# Patient Record
Sex: FEMALE | Race: BLACK OR AFRICAN AMERICAN | Employment: OTHER | URBAN - METROPOLITAN AREA
[De-identification: names, ages, dates, MRNs, and addresses within clinical notes are randomized per-mention and may not be internally consistent; named-entity substitution may affect disease eponyms.]

---

## 2017-02-13 RX ORDER — VALSARTAN 320 MG/1
TABLET ORAL
Qty: 90 TAB | Refills: 3 | Status: SHIPPED | OUTPATIENT
Start: 2017-02-13 | End: 2017-03-31

## 2017-02-13 RX ORDER — SIMVASTATIN 5 MG/1
TABLET, FILM COATED ORAL
Qty: 90 TAB | Refills: 3 | Status: SHIPPED | OUTPATIENT
Start: 2017-02-13 | End: 2018-02-27 | Stop reason: SDUPTHER

## 2017-02-20 ENCOUNTER — OFFICE VISIT (OUTPATIENT)
Dept: FAMILY MEDICINE CLINIC | Facility: CLINIC | Age: 82
End: 2017-02-20

## 2017-02-20 VITALS
TEMPERATURE: 97.5 F | HEIGHT: 61 IN | WEIGHT: 142.8 LBS | SYSTOLIC BLOOD PRESSURE: 134 MMHG | RESPIRATION RATE: 15 BRPM | OXYGEN SATURATION: 98 % | HEART RATE: 60 BPM | BODY MASS INDEX: 26.96 KG/M2 | DIASTOLIC BLOOD PRESSURE: 70 MMHG

## 2017-02-20 DIAGNOSIS — I10 ESSENTIAL HYPERTENSION: ICD-10-CM

## 2017-02-20 DIAGNOSIS — L30.9 ECZEMA, UNSPECIFIED TYPE: ICD-10-CM

## 2017-02-20 DIAGNOSIS — E11.8 TYPE 2 DIABETES MELLITUS WITH COMPLICATION, WITHOUT LONG-TERM CURRENT USE OF INSULIN (HCC): Primary | ICD-10-CM

## 2017-02-20 DIAGNOSIS — M1A.0790 IDIOPATHIC CHRONIC GOUT OF FOOT WITHOUT TOPHUS, UNSPECIFIED LATERALITY: ICD-10-CM

## 2017-02-20 LAB — HBA1C MFR BLD HPLC: 6.3 %

## 2017-02-20 RX ORDER — TRIAMCINOLONE ACETONIDE 1 MG/G
OINTMENT TOPICAL 2 TIMES DAILY
Qty: 270 G | Refills: 0 | Status: SHIPPED | OUTPATIENT
Start: 2017-02-20 | End: 2017-03-31

## 2017-02-20 NOTE — MR AVS SNAPSHOT
Visit Information Date & Time Provider Department Dept. Phone Encounter #  
 2/20/2017  9:30 AM Funmilayo Tomas MD [facility] [phone] [id number] Follow-up Instructions Return in about 3 months (around 5/20/2017). Your Appointments 5/22/2017  9:30 AM  
Follow Up with Funmilayo Tomas MD  
[facility] ([facility]) Appt Note: Return in about 3 months (around 5/20/2017) for Fasting. [address] [place] [address] [phone]  
[address] [place] [address] Upcoming Health Maintenance Date Due ZOSTER VACCINE AGE 60> 2/21/2017* DTaP/Tdap/Td series (1 - Tdap) 2/21/2017* FOOT EXAM Q1 4/28/2017 MICROALBUMIN Q1 4/28/2017 EYE EXAM RETINAL OR DILATED Q1 5/19/2017 HEMOGLOBIN A1C Q6M 5/21/2017 LIPID PANEL Q1 11/21/2017 MEDICARE YEARLY EXAM 11/22/2017 GLAUCOMA SCREENING Q2Y 5/19/2018 *Topic was postponed. The date shown is not the original due date. Allergies as of 2/20/2017  Review Complete On: 2/20/2017 By: Funmilayo Tomas MD  
 No Known Allergies Current Immunizations  Reviewed on 2/20/2017 Name Date Influenza Vaccine (Quad) PF 12/21/2016 10:55 AM, 9/23/2015  9:50 AM  
 Pneumococcal Conjugate (PCV-13) 11/5/2015 12:37 PM  
 Pneumococcal Polysaccharide (PPSV-23) 11/23/2016 Reviewed by Erika Gonzales on 2/20/2017 at  9:32 AM  
You Were Diagnosed With   
  
 Codes Comments Type 2 diabetes mellitus with complication, without long-term current use of insulin (HCC)    -  Primary ICD-10-CM: E11.8 ICD-9-CM: 250.90 Eczema, unspecified type     ICD-10-CM: L30.9 ICD-9-CM: 692.9 Essential hypertension     ICD-10-CM: I10 
ICD-9-CM: 401.9 Idiopathic chronic gout of foot without tophus, unspecified laterality     ICD-10-CM: M1A.0790 ICD-9-CM: 274.02 Vitals BP Pulse Temp Resp Height(growth percentile) Weight(growth percentile) 134/70 (BP 1 Location: Left arm, BP Patient Position: Sitting) 60 97.5 °F (36.4 °C) (Oral) 15 5' 1\" (1.549 m) 142 lb 12.8 oz (64.8 kg) SpO2 BMI OB Status Smoking Status 98% 26.98 kg/m2 Menopause Former Smoker Vitals History BMI and BSA Data Body Mass Index Body Surface Area  
 26.98 kg/m 2 1.67 m 2 Preferred Pharmacy Pharmacy Name Phone [id number] [address]  [phone] Your Updated Medication List  
  
   
This list is accurate as of: 2/20/17 10:25 AM.  Always use your most recent med list. amLODIPine 5 mg tablet Commonly known as:  Membreno Del Take 1 Tab by mouth daily. aspirin delayed-release 81 mg tablet Take  by mouth daily. Blood-Glucose Meter monitoring kit Check glucose once daily. Re: DM .00  
  
 clopidogrel 75 mg Tab Commonly known as:  PLAVIX Take 1 Tab by mouth daily. diph,Pertuss(Acell),Tet Vac-PF 2 Lf-(2.5-5-3-5 mcg)-5Lf/0.5 mL susp Commonly known as:  ADACEL  
0.5 mL by IntraMUSCular route once for 1 dose. glucose blood VI test strips strip Commonly known as:  blood glucose test  
Check glucose once daily. Re: DM .00 Lancets Misc Check glucose once daily. Re: DM .00  
  
 metoprolol tartrate 50 mg tablet Commonly known as:  LOPRESSOR  
take 1/2 tablet by mouth twice a day  
  
 predniSONE 20 mg tablet Commonly known as:  Sandy Hawks Take 3 tab for 1 day, 2 tab or 2 days, 1 tab for 5 days. simvastatin 5 mg tablet Commonly known as:  ZOCOR  
take 1 tablet by mouth at bedtime  
  
 triamcinolone acetonide 0.1 % ointment Commonly known as:  KENALOG Apply  to affected area two (2) times a day. use thin layer  
  
 valsartan 320 mg tablet Commonly known as:  DIOVAN  
take 1 tablet by mouth once daily Prescriptions Printed Refills diph,Pertuss,Acell,,Tet Vac-PF (ADACEL) 2 Lf-(2.5-5-3-5 mcg)-5Lf/0.5 mL susp 0 Si.5 mL by IntraMUSCular route once for 1 dose. Class: Print Route: IntraMUSCular Prescriptions Sent to Pharmacy Refills  
 triamcinolone acetonide (KENALOG) 0.1 % ointment 0 Sig: Apply  to affected area two (2) times a day. use thin layer Class: Normal  
 Pharmacy: [address]  #: [phone] Route: Topical  
  
We Performed the Following AMB POC HEMOGLOBIN A1C [21771 CPT(R)] Follow-up Instructions Return in about 3 months (around 2017). Introducing [facility]! Makayla Bailon introduces [employer] patient portal. Now you can access parts of your medical record, email your doctor's office, and request medication refills online. 1. In your internet browser, go to [email or web address] 2. Click on the First Time User? Click Here link in the Sign In box. You will see the New Member Sign Up page. 3. Enter your [employer] Access Code exactly as it appears below. You will not need to use this code after youve completed the sign-up process. If you do not sign up before the expiration date, you must request a new code. · [employer] Access Code: [email or web address] Expires: 2017  9:23 AM 
 
4. Enter the last four digits of your Social Security Number (xxxx) and Date of Birth (mm/dd/yyyy) as indicated and click Submit. You will be taken to the next sign-up page. 5. Create a quitchent ID. This will be your [employer] login ID and cannot be changed, so think of one that is secure and easy to remember. 6. Create a quitchent password. You can change your password at any time. 7. Enter your Password Reset Question and Answer. This can be used at a later time if you forget your password. 8. Enter your e-mail address.  You will receive e-mail notification when new information is available in [employer]. 9. Click Sign Up. You can now view and download portions of your medical record. 10. Click the Download Summary menu link to download a portable copy of your medical information. If you have questions, please visit the Frequently Asked Questions section of the [employer] website. Remember, [employer] is NOT to be used for urgent needs. For medical emergencies, dial 911. Now available from your iPhone and Android! Please provide this summary of care documentation to your next provider. Your primary care clinician is listed as [address] [place] [address]. If you have any questions after today's visit, please call [phone].

## 2017-02-20 NOTE — PROGRESS NOTES
SUBJECTIVE:  Zaria Francis is a 80y.o. year old female   Chief Complaint   Patient presents with    Hypertension    Diabetes    Gout       History of Present Illness:     She is following ADA diet for DM II. She is not checking her glucose. Her BP is controlled with medicine. Her DM is controlled w/o medicine. She sees cardiology once a year and Oncology every 6 months. No more gout attack. He has chronic recurrent Eczema on her arms. She has flare up for several months. No Systemic, Cardiovascular or Respiratory symptoms. Past Medical History   Diagnosis Date    Arteriosclerotic heart disease (ASHD)     CKD (chronic kidney disease), stage III 6/23/2015    CVA (cerebral infarction)     Diabetes ([facility] [address]     Gout 9/23/13     Left Foot    Hypertension     Leg weakness, bilateral 3/18/13     History reviewed. No pertinent past surgical history. Current Outpatient Prescriptions   Medication Sig    valsartan (DIOVAN) 320 mg tablet take 1 tablet by mouth once daily    simvastatin (ZOCOR) 5 mg tablet take 1 tablet by mouth at bedtime    clopidogrel (PLAVIX) 75 mg tab Take 1 Tab by mouth daily.  predniSONE (DELTASONE) 20 mg tablet Take 3 tab for 1 day, 2 tab or 2 days, 1 tab for 5 days.  amLODIPine (NORVASC) 5 mg tablet Take 1 Tab by mouth daily.  metoprolol tartrate (LOPRESSOR) 50 mg tablet take 1/2 tablet by mouth twice a day    Blood-Glucose Meter monitoring kit Check glucose once daily. Re: DM .00    glucose blood VI test strips (BLOOD GLUCOSE TEST) strip Check glucose once daily. Re: DM .00    Lancets misc Check glucose once daily. Re: DM .00    aspirin delayed-release 81 mg tablet Take  by mouth daily. No current facility-administered medications for this visit. Taking prednisone PRN gout. No Known Allergies       Review of Systems:   Constitutional: No fever, chills, night sweats, malaise, dizziness.     Cardiovascular: No angina, palpitations, PND, orthopnea, lightheadedness, edema, claudication. Respiratory: No dyspnea, wheeze, pleurisy, hemoptysis, unusual cough or sputum. Gastrointestinal: no nausea/ vomiting, bowel habit change, pain, LI symptoms, melena, hematochezia, anorexia. Musculoskeletal: as above  Psychiatric: No agitation, confusion/disorientation, suicidal or homicidal ideation. Skin: as above. OBJECTIVE:  Physical Exam:   Constitutional: General Appearance:  well developed, well nourished, nontoxic, in no acute distress. Visit Vitals    /70 (BP 1 Location: Left arm, BP Patient Position: Sitting)    Pulse 60    Temp 97.5 °F (36.4 °C) (Oral)    Resp 15    Ht 5' 1\" (1.549 m)    Wt 142 lb 12.8 oz (64.8 kg)    SpO2 98%    BMI 26.98 kg/m2     Pulmonary: Respiratory effort: normal; no dyspnea, no retractions, no accessory muscle use. Auscultation: normal & symmetrical air exchange; no rales, no rhonchi, no wheeze; no rubs  Cardiovascular[de-identified] Palpation of Heart: PMI not displaced or enlarged, no thrills, no heaves. Auscultation of Heart: RRR; G I/VI systolic murmur, no rubs, no gallops. Neck: carotid arteries- normal volume & without bruit; no JVD. Extremities: no edema, no active varicosity. GI[de-identified] Normal bowel sounds. No masses; no tenderness; no rebound/rigidity; no CVA tenderness. No hepatosplenomegaly. Psychiatric: Oriented to time, place and person. Musculoskeletal: NC/AT. Neck-supple. Skin: scattered papulosquamous rash on both arms with hyperpigmentation. No induration, heat, redness, tenderness.       Lab Results   Component Value Date/Time    WBC 7.6 11/21/2016 11:58 AM    HGB 12.5 11/21/2016 11:58 AM    HCT 46.5 11/21/2016 11:58 AM    PLATELET 161 82/76/5862 11:58 AM    MCV 91.7 11/21/2016 11:58 AM     Lab Results   Component Value Date/Time    Sodium 141 11/21/2016 11:58 AM    Potassium 4.7 11/21/2016 11:58 AM    Chloride 108 11/21/2016 11:58 AM    CO2 21 11/21/2016 11:58 AM    Anion gap 12 11/21/2016 11:58 AM    Glucose 93 11/21/2016 11:58 AM    BUN 17 11/21/2016 11:58 AM    Creatinine 1.16 11/21/2016 11:58 AM    BUN/Creatinine ratio 15 11/21/2016 11:58 AM    GFR est AA 54 11/21/2016 11:58 AM    GFR est non-AA 44 11/21/2016 11:58 AM    Calcium 9.0 11/21/2016 11:58 AM    Bilirubin, total 0.3 07/28/2016 10:21 AM    AST (SGOT) 11 07/28/2016 10:21 AM    Alk. phosphatase 68 07/28/2016 10:21 AM    Protein, total 7.7 07/28/2016 10:21 AM    Albumin 3.5 07/28/2016 10:21 AM    Globulin 4.2 07/28/2016 10:21 AM    A-G Ratio 0.8 07/28/2016 10:21 AM    ALT (SGPT) 19 07/28/2016 10:21 AM     Lab Results   Component Value Date/Time    TSH 0.99 07/28/2016 10:21 AM     Lab Results   Component Value Date/Time    Cholesterol, total 113 11/21/2016 11:58 AM    HDL Cholesterol 59 11/21/2016 11:58 AM    LDL, calculated 39 11/21/2016 11:58 AM    VLDL, calculated 15 11/21/2016 11:58 AM    Triglyceride 75 11/21/2016 11:58 AM    CHOL/HDL Ratio 1.9 11/21/2016 11:58 AM     Lab Results   Component Value Date/Time    Hemoglobin A1c 6.7 07/28/2016 10:21 AM    Hemoglobin A1c (POC) 6.3 02/20/2017 10:23 AM        ASSESSMENT:     1. Type 2 diabetes mellitus with complication, without long-term current use of insulin (HCC)    2. Eczema, unspecified type    3. Essential hypertension    4. Idiopathic chronic gout of foot without tophus, unspecified laterality        PLAN:     Pharmacologic Management: Medications reviewed with the patient. TCA 0.1% oint bid. Orders Placed This Encounter    AMB POC HEMOGLOBIN A1C    diph,Chance Mackay,,Tet Vac-PF (ADACEL) 2 Lf-(2.5-5-3-5 mcg)-5Lf/0.5 mL susp    triamcinolone acetonide (KENALOG) 0.1 % ointment     Other Instructions: Discussed DDx, follow-up & work-up. Discussed risk/benefit & side effect of treatment. Follow up visit as planned, prn sooner. Skin care. Consider referral to Dermatology. Low salt ADA diet & graduated excecise. Recommend home glucose monitoring.   Follow up with cardiology. Health risk from non adherence discussed. Patient/ daughter voiced understanding. Follow-up Disposition:  Return in about 3 months (around 5/20/2017).      Messi Stevens MD

## 2017-02-20 NOTE — PROGRESS NOTES
Frankie Palomares is a 80 y.o.  female presents today for office visit for routine follow up. Pt is in Room # 5. This patient is accompanied in the office by her daughter. 1. Have you been to the ER, urgent care clinic since your last visit? Hospitalized since your last visit? No    2. Have you seen or consulted any other health care providers outside of the [address] [place] [address] since your last visit? Include any pap smears or colon screening. No       Health Maintenance reviewed - prescription for tetanus booster given. Pt declines Zoster vaccine at this time.

## 2017-02-20 NOTE — ACP (ADVANCE CARE PLANNING)
Discussed Advance Medical Directive (AMD) and it's importance. Patient is agreeable to take a copy Advance Medical Directive Toolkit and [facility] form at this time.

## 2017-03-10 RX ORDER — PREDNISONE 20 MG/1
TABLET ORAL
Qty: 10 TAB | Refills: 0 | Status: SHIPPED | OUTPATIENT
Start: 2017-03-10 | End: 2017-04-28

## 2017-03-17 RX ORDER — METOPROLOL TARTRATE 50 MG/1
TABLET ORAL
Qty: 90 TAB | Refills: 3 | Status: SHIPPED | OUTPATIENT
Start: 2017-03-17 | End: 2018-02-28 | Stop reason: SDUPTHER

## 2017-03-27 ENCOUNTER — TELEPHONE (OUTPATIENT)
Dept: FAMILY MEDICINE CLINIC | Facility: CLINIC | Age: 82
End: 2017-03-27

## 2017-03-27 NOTE — TELEPHONE ENCOUNTER
Spoke with pt daughter in regards to Diovan reaction. Pt needs to stop the Diovan and make an apt this week. Pt's daughter acknowledges understanding and voices no concerns at this time.

## 2017-03-29 RX ORDER — CLOPIDOGREL BISULFATE 75 MG/1
TABLET ORAL
Qty: 90 TAB | Refills: 0 | Status: SHIPPED | OUTPATIENT
Start: 2017-03-29 | End: 2017-06-23 | Stop reason: SDUPTHER

## 2017-03-31 ENCOUNTER — OFFICE VISIT (OUTPATIENT)
Dept: FAMILY MEDICINE CLINIC | Facility: CLINIC | Age: 82
End: 2017-03-31

## 2017-03-31 VITALS
BODY MASS INDEX: 26.55 KG/M2 | DIASTOLIC BLOOD PRESSURE: 80 MMHG | TEMPERATURE: 97.6 F | HEIGHT: 61 IN | RESPIRATION RATE: 15 BRPM | WEIGHT: 140.6 LBS | HEART RATE: 60 BPM | OXYGEN SATURATION: 98 % | SYSTOLIC BLOOD PRESSURE: 150 MMHG

## 2017-03-31 DIAGNOSIS — I10 ESSENTIAL HYPERTENSION: ICD-10-CM

## 2017-03-31 DIAGNOSIS — I25.10 ASHD (ARTERIOSCLEROTIC HEART DISEASE): ICD-10-CM

## 2017-03-31 DIAGNOSIS — E11.8 TYPE 2 DIABETES MELLITUS WITH COMPLICATION, WITHOUT LONG-TERM CURRENT USE OF INSULIN (HCC): ICD-10-CM

## 2017-03-31 DIAGNOSIS — T78.3XXA ANGIOEDEMA OF LIPS, INITIAL ENCOUNTER: Primary | ICD-10-CM

## 2017-03-31 RX ORDER — AMLODIPINE BESYLATE 10 MG/1
10 TABLET ORAL DAILY
Qty: 90 TAB | Refills: 0
Start: 2017-03-31 | End: 2017-04-28 | Stop reason: SDUPTHER

## 2017-03-31 NOTE — PROGRESS NOTES
SUBJECTIVE:  Zachary Covarrubias is a 80y.o. year old female   Chief Complaint   Patient presents with    Lip Swelling       History of Present Illness:   She went to the ER on 3/26/17 for lip swelling. She had not been exposed to any new food, cosmetics, sprays, etc.  Therefore it was felt that the angioedema was from Diovan and it was discontinued. She was started on Prednisone, which she is finishing today. Her lip swelling has resolved. Denies trouble breathing or tongue swelling. No additional complaints at this time. She is following ADA diet for DM II. She is not checking her glucose. No Systemic, Cardiovascular or Respiratory symptoms. Past Medical History:   Diagnosis Date    Arteriosclerotic heart disease (ASHD)     CKD (chronic kidney disease), stage III 6/23/2015    CVA (cerebral infarction)     Diabetes ([facility] [address]     Gout 9/23/13    Left Foot    Hypertension     Leg weakness, bilateral 3/18/13     History reviewed. No pertinent surgical history. Current Outpatient Prescriptions   Medication Sig    clopidogrel (PLAVIX) 75 mg tab take 1 tablet by mouth once daily    metoprolol tartrate (LOPRESSOR) 50 mg tablet take 1/2 tablet by mouth twice a day    predniSONE (DELTASONE) 20 mg tablet Take 3 tab for 1 day, 2 tab or 2 days, 1 tab for 3 days.  simvastatin (ZOCOR) 5 mg tablet take 1 tablet by mouth at bedtime    amLODIPine (NORVASC) 5 mg tablet Take 1 Tab by mouth daily.  Blood-Glucose Meter monitoring kit Check glucose once daily. Re: DM .00    glucose blood VI test strips (BLOOD GLUCOSE TEST) strip Check glucose once daily. Re: DM .00    Lancets misc Check glucose once daily. Re: DM .00    aspirin delayed-release 81 mg tablet Take  by mouth daily. No current facility-administered medications for this visit. No Known Allergies       Review of Systems:   Constitutional: No fever, chills, night sweats, malaise, dizziness.     Cardiovascular: No angina, palpitations, PND, orthopnea, lightheadedness, edema, claudication. Respiratory: No dyspnea, wheeze, pleurisy, hemoptysis, unusual cough or sputum. Gastrointestinal: no nausea/ vomiting, bowel habit change, pain, LI symptoms, melena, hematochezia, anorexia. Musculoskeletal: no acute change  Psychiatric: No agitation, confusion/disorientation, suicidal or homicidal ideation. Skin: lip is better. OBJECTIVE:  Physical Exam:   Constitutional: General Appearance:  well developed, well nourished, nontoxic, in no acute distress. Visit Vitals    /80 (BP 1 Location: Left arm, BP Patient Position: Sitting)    Pulse 60    Temp 97.6 °F (36.4 °C) (Oral)    Resp 15    Ht 5' 1\" (1.549 m)    Wt 140 lb 9.6 oz (63.8 kg)    SpO2 98%    BMI 26.57 kg/m2     ENT[de-identified] No lip swelling or tongue swelling. Nose: clear. Throat:  Clear. Pulmonary: Respiratory effort: normal; no dyspnea, no retractions, no accessory muscle use. Auscultation: normal & symmetrical air exchange; no rales, no rhonchi, no wheeze; no rubs    Cardiovascular[de-identified] Palpation of Heart: PMI not displaced or enlarged, no thrills, no heaves. Auscultation of Heart: RRR; G I/VI systolic murmur, no rubs, no gallops. Neck: carotid arteries- normal volume & without bruit; no JVD. Extremities: no edema, no active varicosity. GI[de-identified] Normal bowel sounds. No masses; no tenderness; no rebound/rigidity; no CVA tenderness. No hepatosplenomegaly. Psychiatric: Oriented to time, place and person. Musculoskeletal: NC/AT. Neck-supple. Skin: scattered papulosquamous rash on both arms with hyperpigmentation is better. No induration, heat, redness, tenderness.       Lab Results   Component Value Date/Time    WBC 7.6 11/21/2016 11:58 AM    HGB 12.5 11/21/2016 11:58 AM    HCT 46.5 11/21/2016 11:58 AM    PLATELET 841 20/88/2590 11:58 AM    MCV 91.7 11/21/2016 11:58 AM     Lab Results   Component Value Date/Time    Sodium 141 11/21/2016 11:58 AM Potassium 4.7 11/21/2016 11:58 AM    Chloride 108 11/21/2016 11:58 AM    CO2 21 11/21/2016 11:58 AM    Anion gap 12 11/21/2016 11:58 AM    Glucose 93 11/21/2016 11:58 AM    BUN 17 11/21/2016 11:58 AM    Creatinine 1.16 11/21/2016 11:58 AM    BUN/Creatinine ratio 15 11/21/2016 11:58 AM    GFR est AA 54 11/21/2016 11:58 AM    GFR est non-AA 44 11/21/2016 11:58 AM    Calcium 9.0 11/21/2016 11:58 AM    Bilirubin, total 0.3 07/28/2016 10:21 AM    AST (SGOT) 11 07/28/2016 10:21 AM    Alk. phosphatase 68 07/28/2016 10:21 AM    Protein, total 7.7 07/28/2016 10:21 AM    Albumin 3.5 07/28/2016 10:21 AM    Globulin 4.2 07/28/2016 10:21 AM    A-G Ratio 0.8 07/28/2016 10:21 AM    ALT (SGPT) 19 07/28/2016 10:21 AM     Lab Results   Component Value Date/Time    TSH 0.99 07/28/2016 10:21 AM     Lab Results   Component Value Date/Time    Cholesterol, total 113 11/21/2016 11:58 AM    HDL Cholesterol 59 11/21/2016 11:58 AM    LDL, calculated 39 11/21/2016 11:58 AM    VLDL, calculated 15 11/21/2016 11:58 AM    Triglyceride 75 11/21/2016 11:58 AM    CHOL/HDL Ratio 1.9 11/21/2016 11:58 AM     Lab Results   Component Value Date/Time    Hemoglobin A1c 6.7 07/28/2016 10:21 AM    Hemoglobin A1c (POC) 6.3 02/20/2017 10:23 AM       CBC WITH DIFFERENTIAL (03/26/2017 10:46 PM)   Component Value Range   WBC x 10*3 8.5 4.0-11.0 K/uL   RBC x 10^6 5. 14 3.80-5.20 M/uL   HGB 12.7 11.7-16.1 g/dL   HCT 39.9 35.1-48.3 %   MCV 78 (L) 80-95 fL   MCH 25 (L) 26-34 pg   MCHC 32 32-36 g/dL   RDW 16.4 (H) 10.0-16.0 %   Platelet 633 388-630 K/uL   MPV 11.3 (H) 6.0-10.8 fL   Segmented Neutrophils 74 40-75 %   Lymphocytes 15 (L) 27-45 %   Monocytes 7 3-9 %   Eosinophil 3 0-6 %   Basophils 0 0-2 %   Absolute Neutrophils 6.3 1.8-7.7 K/uL   Absolute Lymphocytes 1.3 1.0-4.8 K/uL   Absolute Monocyte Count 0.6 0.1-0.9 K/uL   Absolute Eosinophil 0.2 0.0-0.5 K/uL   Absolute Basophil Count 0.0 0.0-0.2 K/uL     BASIC METABOLIC PANEL (84/89/5146 10:46 PM)   Component Value Range   POTASSIUM 5.4 3.5-5.5 mmol/L   SODIUM 139 133-145 mmol/L   CHLORIDE 102  mmol/L   GLUCOSE 140 (H) 65-99 mg/dL   CALCIUM 9.3 8.4-10.5 mg/dL   BUN 20 6-22 mg/dL   CREATININE 0.9 0.8-1.4 mg/dL   CO2 25 20-32 mmol/L   eGFR African American >60.0 >60.0   eGFR Non African American 55.8 (L) >60.0   ANION GAP 11.7 mmol/L        ASSESSMENT:     1. Angioedema of lips, initial encounter    2. Essential hypertension    3. ASHD (arteriosclerotic heart disease)    4. Type 2 diabetes mellitus with complication, without long-term current use of insulin (HCC)        PLAN:     Pharmacologic Management: Medications reviewed with the patient. Increase Norvasc to 10 every day. Orders Placed This Encounter    amLODIPine (NORVASC) 10 mg tablet     Other Instructions: Discussed DDx, follow-up & work-up. Discussed risk/benefit & side effect of treatment. Follow up visit as planned, prn sooner. PRN to ER. Low salt ADA diet & graduated excecise. Recommend home glucose monitoring. Follow up with cardiology. Health risk from non adherence discussed. Patient/ daughter voiced understanding. Follow-up Disposition:  Return in about 1 month (around 4/30/2017).      Mehul Barclay MD

## 2017-03-31 NOTE — MR AVS SNAPSHOT
Visit Information Date & Time Provider Department Dept. Phone Encounter #  
 3/31/2017  9:00 AM Gilbert Cleveland MD [facility] [phone] [id number] Follow-up Instructions Return in about 1 month (around 4/30/2017). Your Appointments 4/28/2017  9:00 AM  
Follow Up with Gilbert Cleveland MD  
[facility] ([facility]) Appt Note: Return in about 1 month (around 4/30/2017). [address] [place] [phone]  
[phone]  
  
   
 [id number]  
  
    
 5/22/2017  9:30 AM  
Follow Up with Gilbert Cleveland MD  
[facility] ([facility]) Appt Note: Return in about 3 months (around 5/20/2017) for Fasting. [address] [facility] [address] [phone]  
[phone] Upcoming Health Maintenance Date Due ZOSTER VACCINE AGE 60> 5/25/1990 FOOT EXAM Q1 4/28/2017 MICROALBUMIN Q1 4/28/2017 EYE EXAM RETINAL OR DILATED Q1 5/19/2017 HEMOGLOBIN A1C Q6M 8/20/2017 LIPID PANEL Q1 11/21/2017 MEDICARE YEARLY EXAM 11/22/2017 GLAUCOMA SCREENING Q2Y 5/19/2018 DTaP/Tdap/Td series (2 - Td) 2/24/2027 Allergies as of 3/31/2017  Review Complete On: 3/31/2017 By: Gilbert Cleveland MD  
 No Known Allergies Current Immunizations  Reviewed on 2/20/2017 Name Date DTaP 2/24/2017 Influenza Vaccine (Quad) PF 12/21/2016 10:55 AM, 9/23/2015  9:50 AM  
 Pneumococcal Conjugate (PCV-13) 11/5/2015 12:37 PM  
 Pneumococcal Polysaccharide (PPSV-23) 11/23/2016 Not reviewed this visit Vitals BP Pulse Temp Resp Height(growth percentile) Weight(growth percentile) 150/80 (BP 1 Location: Left arm, BP Patient Position: Sitting) 60 97.6 °F (36.4 °C) (Oral) 15 5' 1\" (1.549 m) 140 lb 9.6 oz (63.8 kg) SpO2 BMI OB Status Smoking Status 98% 26.57 kg/m2 Menopause Former Smoker Vitals History BMI and BSA Data Body Mass Index Body Surface Area  
 26.57 kg/m 2 1.66 m 2 Preferred Pharmacy Pharmacy Name Phone [facility] [phone] Your Updated Medication List  
  
   
This list is accurate as of: 3/31/17  9:48 AM.  Always use your most recent med list. amLODIPine 10 mg tablet Commonly known as:  Real Mend Take 1 Tab by mouth daily. aspirin delayed-release 81 mg tablet Take  by mouth daily. Blood-Glucose Meter monitoring kit Check glucose once daily. Re: DM .00  
  
 clopidogrel 75 mg Tab Commonly known as:  PLAVIX  
take 1 tablet by mouth once daily  
  
 glucose blood VI test strips strip Commonly known as:  blood glucose test  
Check glucose once daily. Re: DM .00 Lancets Misc Check glucose once daily. Re: DM .00  
  
 metoprolol tartrate 50 mg tablet Commonly known as:  LOPRESSOR  
take 1/2 tablet by mouth twice a day  
  
 predniSONE 20 mg tablet Commonly known as:  Holguin Stair Take 3 tab for 1 day, 2 tab or 2 days, 1 tab for 3 days. simvastatin 5 mg tablet Commonly known as:  ZOCOR  
take 1 tablet by mouth at bedtime Follow-up Instructions Return in about 1 month (around 4/30/2017). Introducing [facility] & HEALTH SERVICES! Miguelito Mireles introduces [employer] patient portal. Now you can access parts of your medical record, email your doctor's office, and request medication refills online. 1. In your internet browser, go to [email or web address] 2. Click on the First Time User? Click Here link in the Sign In box. You will see the New Member Sign Up page. 3. Enter your Propeller Access Code exactly as it appears below. You will not need to use this code after youve completed the sign-up process. If you do not sign up before the expiration date, you must request a new code. · [employer] Access Code: [email or web address] Expires: 5/21/2017 10:23 AM 
 
4. Enter the last four digits of your Social Security Number (xxxx) and Date of Birth (mm/dd/yyyy) as indicated and click Submit. You will be taken to the next sign-up page. 5. Create a OurCrowd ID. This will be your OurCrowd login ID and cannot be changed, so think of one that is secure and easy to remember. 6. Create a OurCrowd password. You can change your password at any time. 7. Enter your Password Reset Question and Answer. This can be used at a later time if you forget your password. 8. Enter your e-mail address. You will receive e-mail notification when new information is available in [address] 9. Click Sign Up. You can now view and download portions of your medical record. 10. Click the Download Summary menu link to download a portable copy of your medical information. If you have questions, please visit the Frequently Asked Questions section of the OurCrowd website. Remember, OurCrowd is NOT to be used for urgent needs. For medical emergencies, dial 911. Now available from your iPhone and Android! Please provide this summary of care documentation to your next provider. Your primary care clinician is listed as [address] [place] [address]. If you have any questions after today's visit, please call [phone].

## 2017-03-31 NOTE — PROGRESS NOTES
Bruna Hinds is a 80 y.o.  female presents today for offcie visit for acute care. Pt is in Room # 5      1. Have you been to the ER, urgent care clinic since your last visit? Hospitalized since your last visit? Yes When: Sunday Where: [address] Reason for visit: swollen lip    2. Have you seen or consulted any other health care providers outside of the [address] [place] [address] Gustavo since your last visit? Include any pap smears or colon screening. No    No Patient Care Coordination Note on file.

## 2017-04-11 ENCOUNTER — TELEPHONE (OUTPATIENT)
Dept: FAMILY MEDICINE CLINIC | Facility: CLINIC | Age: 82
End: 2017-04-11

## 2017-04-11 NOTE — TELEPHONE ENCOUNTER
Spoke with pt's daughter Roxie Seals in regards to Norvasc. Pt is encouraged to take 1.5 tabs daily when her daughters feel like her blood pressure is to low. If blood pressure is elevated go back to 2 tabs daily. Pt's daughter Roxie Seals acknowledges understanding and voices no concerns at this time.

## 2017-04-28 ENCOUNTER — OFFICE VISIT (OUTPATIENT)
Dept: FAMILY MEDICINE CLINIC | Facility: CLINIC | Age: 82
End: 2017-04-28

## 2017-04-28 VITALS
TEMPERATURE: 98 F | BODY MASS INDEX: 26.43 KG/M2 | DIASTOLIC BLOOD PRESSURE: 68 MMHG | RESPIRATION RATE: 13 BRPM | WEIGHT: 140 LBS | HEART RATE: 64 BPM | OXYGEN SATURATION: 97 % | HEIGHT: 61 IN | SYSTOLIC BLOOD PRESSURE: 136 MMHG

## 2017-04-28 DIAGNOSIS — E11.8 TYPE 2 DIABETES MELLITUS WITH COMPLICATION, WITHOUT LONG-TERM CURRENT USE OF INSULIN (HCC): ICD-10-CM

## 2017-04-28 DIAGNOSIS — I25.10 ASHD (ARTERIOSCLEROTIC HEART DISEASE): ICD-10-CM

## 2017-04-28 DIAGNOSIS — I10 ESSENTIAL HYPERTENSION: Primary | ICD-10-CM

## 2017-04-28 RX ORDER — AMLODIPINE BESYLATE 5 MG/1
7.5 TABLET ORAL DAILY
Qty: 135 TAB | Refills: 0 | Status: SHIPPED | OUTPATIENT
Start: 2017-04-28 | End: 2017-05-22 | Stop reason: SDUPTHER

## 2017-04-28 NOTE — MR AVS SNAPSHOT
Visit Information Date & Time Provider Department Dept. Phone Encounter #  
 4/28/2017  9:00 AM MD Garcia BrownAdryan Danasea 47 [phone] [id number] Your Appointments 5/22/2017  9:30 AM  
Follow Up with Yola Interiano MD  
Tina Sherrelltad Pfeiffer ([address]) Appt Note: Return in about 3 months (around 5/20/2017) for Fasting. [address] [place] [phone]  
[address] [place] [address] Upcoming Health Maintenance Date Due ZOSTER VACCINE AGE 60> 5/25/1990 FOOT EXAM Q1 4/28/2017 MICROALBUMIN Q1 4/28/2017 EYE EXAM RETINAL OR DILATED Q1 5/19/2017 HEMOGLOBIN A1C Q6M 8/20/2017 LIPID PANEL Q1 11/21/2017 MEDICARE YEARLY EXAM 11/22/2017 GLAUCOMA SCREENING Q2Y 5/19/2018 DTaP/Tdap/Td series (2 - Td) 2/24/2027 Allergies as of 4/28/2017  Review Complete On: 4/28/2017 By: Yola Interiano MD  
 No Known Allergies Current Immunizations  Reviewed on 2/20/2017 Name Date DTaP 2/24/2017 Influenza Vaccine (Quad) PF 12/21/2016 10:55 AM, 9/23/2015  9:50 AM  
 Pneumococcal Conjugate (PCV-13) 11/5/2015 12:37 PM  
 Pneumococcal Polysaccharide (PPSV-23) 11/23/2016 Not reviewed this visit Vitals BP Pulse Temp Resp Height(growth percentile) Weight(growth percentile) 136/68 (BP 1 Location: Left arm, BP Patient Position: Sitting) 64 98 °F (36.7 °C) (Oral) 13 5' 1\" (1.549 m) 140 lb (63.5 kg) SpO2 BMI OB Status Smoking Status 97% 26.45 kg/m2 Menopause Former Smoker Vitals History BMI and BSA Data Body Mass Index Body Surface Area  
 26.45 kg/m 2 1.65 m 2 Preferred Pharmacy Pharmacy Name Phone [facility] [address] [phone] Your Updated Medication List  
  
   
This list is accurate as of: 4/28/17  9:22 AM.  Always use your most recent med list. amLODIPine 10 mg tablet Commonly known as:  Lexx Sterling Take 1 Tab by mouth daily. aspirin delayed-release 81 mg tablet Take 81 mg by mouth daily. Blood-Glucose Meter monitoring kit Check glucose once daily. Re: DM .00  
  
 clopidogrel 75 mg Tab Commonly known as:  PLAVIX  
take 1 tablet by mouth once daily  
  
 glucose blood VI test strips strip Commonly known as:  blood glucose test  
Check glucose once daily. Re: DM .00 Lancets Misc Check glucose once daily. Re: DM .00  
  
 metoprolol tartrate 50 mg tablet Commonly known as:  LOPRESSOR  
take 1/2 tablet by mouth twice a day  
  
 simvastatin 5 mg tablet Commonly known as:  ZOCOR  
take 1 tablet by mouth at bedtime Introducing [facility] & HEALTH SERVICES! Lucina Christianson introduces LogicLoop patient portal. Now you can access parts of your medical record, email your doctor's office, and request medication refills online. 1. In your internet browser, go to [email or web address] 2. Click on the First Time User? Click Here link in the Sign In box. You will see the New Member Sign Up page. 3. Enter your LogicLoop Access Code exactly as it appears below. You will not need to use this code after youve completed the sign-up process. If you do not sign up before the expiration date, you must request a new code. · LogicLoop Access Code: [email or web address] Expires: 5/21/2017 10:23 AM 
 
4. Enter the last four digits of your Social Security Number (xxxx) and Date of Birth (mm/dd/yyyy) as indicated and click Submit. You will be taken to the next sign-up page. 5. Create a LogicLoop ID. This will be your LogicLoop login ID and cannot be changed, so think of one that is secure and easy to remember. 6. Create a LogicLoop password. You can change your password at any time. 7. Enter your Password Reset Question and Answer.  This can be used at a later time if you forget your password. 8. Enter your e-mail address. You will receive e-mail notification when new information is available in [address] 9. Click Sign Up. You can now view and download portions of your medical record. 10. Click the Download Summary menu link to download a portable copy of your medical information. If you have questions, please visit the Frequently Asked Questions section of the [email or web address] website. Remember, [email or web address] is NOT to be used for urgent needs. For medical emergencies, dial 911. Now available from your iPhone and Android! Please provide this summary of care documentation to your next provider. Your primary care clinician is listed as [address] [place] [address]. If you have any questions after today's visit, please call [phone].

## 2017-04-28 NOTE — PROGRESS NOTES
Martin Seen is a 80 y.o.  female presents today for office visit for one month follow up. Pt is in Room # 4.      1. Have you been to the ER, urgent care clinic since your last visit? Hospitalized since your last visit? No    2. Have you seen or consulted any other health care providers outside of the [address] [place] [address] since your last visit? Include any pap smears or colon screening. No      Health Maintenance reviewed - deferred to next OV per pt.

## 2017-04-28 NOTE — PROGRESS NOTES
SUBJECTIVE:  Army Moreira is a 80y.o. year old female   Chief Complaint   Patient presents with    Medication Evaluation     1mo    Follow-up       History of Present Illness:   She went to the ER on 3/26/17 for lip swelling. It was felt that the angioedema was from Diovan and it was discontinued. Her lip swelling has not returned. No additional complaints at this time. Her amlodipine was increased to 10 mg daily; but her BP at home was too low per her daughter who is an MA. Now she is on amlodipine 7.5 daily. She did not c/o any hypotensive symptoms. She is following ADA diet for DM II. She is not checking her glucose. No Systemic, Cardiovascular or Respiratory symptoms. Past Medical History:   Diagnosis Date    Arteriosclerotic heart disease (ASHD)     CKD (chronic kidney disease), stage III 6/23/2015    CVA (cerebral infarction)     Diabetes ([facility] [address]     Gout 9/23/13    Left Foot    Hypertension     Leg weakness, bilateral 3/18/13     History reviewed. No pertinent surgical history. Current Outpatient Prescriptions   Medication Sig    amLODIPine (NORVASC) 5 mg tablet Take 1.5 Tabs by mouth daily.  clopidogrel (PLAVIX) 75 mg tab take 1 tablet by mouth once daily    metoprolol tartrate (LOPRESSOR) 50 mg tablet take 1/2 tablet by mouth twice a day    simvastatin (ZOCOR) 5 mg tablet take 1 tablet by mouth at bedtime    aspirin delayed-release 81 mg tablet Take 81 mg by mouth daily.  Blood-Glucose Meter monitoring kit Check glucose once daily. Re: DM .00    glucose blood VI test strips (BLOOD GLUCOSE TEST) strip Check glucose once daily. Re: DM .00    Lancets misc Check glucose once daily. Re: DM .00     No current facility-administered medications for this visit. No Known Allergies       Review of Systems:   Constitutional: No fever, chills, night sweats, malaise, dizziness.     Cardiovascular: No angina, palpitations, PND, orthopnea, lightheadedness, edema, claudication. Respiratory: No dyspnea, wheeze, pleurisy, hemoptysis, unusual cough or sputum. Gastrointestinal: no nausea/ vomiting, bowel habit change, pain, LI symptoms, melena, hematochezia, anorexia. Psychiatric: No agitation, confusion/disorientation, suicidal or homicidal ideation. Skin: no new complaints. OBJECTIVE:  Physical Exam:   Constitutional: General Appearance:  well developed, well nourished, nontoxic, in no acute distress. Visit Vitals    /68 (BP 1 Location: Left arm, BP Patient Position: Sitting)    Pulse 64    Temp 98 °F (36.7 °C) (Oral)    Resp 13    Ht 5' 1\" (1.549 m)    Wt 140 lb (63.5 kg)    SpO2 97%    BMI 26.45 kg/m2     ENT[de-identified] No lip swelling or tongue swelling. Nose: clear. Throat:  Clear. Pulmonary: Respiratory effort: normal; no dyspnea, no retractions, no accessory muscle use. Auscultation: normal & symmetrical air exchange; no rales, no rhonchi, no wheeze; no rubs    Cardiovascular[de-identified] Palpation of Heart: PMI not displaced or enlarged, no thrills, no heaves. Auscultation of Heart: RRR; G I/VI systolic murmur, no rubs, no gallops. Neck: carotid arteries- normal volume & without bruit; no JVD. Extremities: no edema, no active varicosity. GI[de-identified] Normal bowel sounds. No masses; no tenderness; no rebound/rigidity; no CVA tenderness. No hepatosplenomegaly. Psychiatric: Oriented to time, place and person. Musculoskeletal: NC/AT. Neck-supple.       Lab Results   Component Value Date/Time    WBC 7.6 11/21/2016 11:58 AM    HGB 12.5 11/21/2016 11:58 AM    HCT 46.5 11/21/2016 11:58 AM    PLATELET 101 69/61/5217 11:58 AM    MCV 91.7 11/21/2016 11:58 AM     Lab Results   Component Value Date/Time    Sodium 141 11/21/2016 11:58 AM    Potassium 4.7 11/21/2016 11:58 AM    Chloride 108 11/21/2016 11:58 AM    CO2 21 11/21/2016 11:58 AM    Anion gap 12 11/21/2016 11:58 AM    Glucose 93 11/21/2016 11:58 AM    BUN 17 11/21/2016 11:58 AM Creatinine 1.16 11/21/2016 11:58 AM    BUN/Creatinine ratio 15 11/21/2016 11:58 AM    GFR est AA 54 11/21/2016 11:58 AM    GFR est non-AA 44 11/21/2016 11:58 AM    Calcium 9.0 11/21/2016 11:58 AM    Bilirubin, total 0.3 07/28/2016 10:21 AM    AST (SGOT) 11 07/28/2016 10:21 AM    Alk. phosphatase 68 07/28/2016 10:21 AM    Protein, total 7.7 07/28/2016 10:21 AM    Albumin 3.5 07/28/2016 10:21 AM    Globulin 4.2 07/28/2016 10:21 AM    A-G Ratio 0.8 07/28/2016 10:21 AM    ALT (SGPT) 19 07/28/2016 10:21 AM     Lab Results   Component Value Date/Time    TSH 0.99 07/28/2016 10:21 AM     Lab Results   Component Value Date/Time    Cholesterol, total 113 11/21/2016 11:58 AM    HDL Cholesterol 59 11/21/2016 11:58 AM    LDL, calculated 39 11/21/2016 11:58 AM    VLDL, calculated 15 11/21/2016 11:58 AM    Triglyceride 75 11/21/2016 11:58 AM    CHOL/HDL Ratio 1.9 11/21/2016 11:58 AM     Lab Results   Component Value Date/Time    Hemoglobin A1c 6.7 07/28/2016 10:21 AM    Hemoglobin A1c (POC) 6.3 02/20/2017 10:23 AM       CBC WITH DIFFERENTIAL (03/26/2017 10:46 PM)   Component Value Range   WBC x 10*3 8.5 4.0-11.0 K/uL   RBC x 10^6 5. 14 3.80-5.20 M/uL   HGB 12.7 11.7-16.1 g/dL   HCT 39.9 35.1-48.3 %   MCV 78 (L) 80-95 fL   MCH 25 (L) 26-34 pg   MCHC 32 32-36 g/dL   RDW 16.4 (H) 10.0-16.0 %   Platelet 904 275-240 K/uL   MPV 11.3 (H) 6.0-10.8 fL   Segmented Neutrophils 74 40-75 %   Lymphocytes 15 (L) 27-45 %   Monocytes 7 3-9 %   Eosinophil 3 0-6 %   Basophils 0 0-2 %   Absolute Neutrophils 6.3 1.8-7.7 K/uL   Absolute Lymphocytes 1.3 1.0-4.8 K/uL   Absolute Monocyte Count 0.6 0.1-0.9 K/uL   Absolute Eosinophil 0.2 0.0-0.5 K/uL   Absolute Basophil Count 0.0 0.0-0.2 K/uL     BASIC METABOLIC PANEL (30/04/3651 10:46 PM)   Component Value Range   POTASSIUM 5.4 3.5-5.5 mmol/L   SODIUM 139 133-145 mmol/L   CHLORIDE 102  mmol/L   GLUCOSE 140 (H) 65-99 mg/dL   CALCIUM 9.3 8.4-10.5 mg/dL   BUN 20 6-22 mg/dL   CREATININE 0.9 0.8-1.4 mg/dL CO2 25 20-32 mmol/L   eGFR African American >60.0 >60.0   eGFR Non African American 55.8 (L) >60.0   ANION GAP 11.7 mmol/L        ASSESSMENT:     1. Essential hypertension    2. Type 2 diabetes mellitus with complication, without long-term current use of insulin ([address]    3. ASHD (arteriosclerotic heart disease)        PLAN:     Pharmacologic Management: Medications reviewed with the patient. No change now. Orders Placed This Encounter    amLODIPine (NORVASC) 5 mg tablet     Discussed DDx, follow-up & work-up. Discussed risk/benefit & side effect of treatment. Follow up visit as scheduled in 3-4 weeks, prn sooner. PRN to ER. Low salt ADA diet & graduated excecise. Recommend home glucose monitoring. Call with BP reading PRN. Follow up with cardiology. Health risk from non adherence discussed. Patient/ daughter voiced understanding.      Jose Juan Early MD

## 2017-05-22 ENCOUNTER — HOSPITAL ENCOUNTER (OUTPATIENT)
Dept: LAB | Age: 82
Discharge: HOME | End: 2017-05-22
Payer: MEDICARE

## 2017-05-22 ENCOUNTER — OFFICE VISIT (OUTPATIENT)
Dept: FAMILY MEDICINE CLINIC | Facility: CLINIC | Age: 82
End: 2017-05-22

## 2017-05-22 VITALS
BODY MASS INDEX: 27 KG/M2 | WEIGHT: 143 LBS | HEART RATE: 60 BPM | HEIGHT: 61 IN | OXYGEN SATURATION: 96 % | DIASTOLIC BLOOD PRESSURE: 78 MMHG | SYSTOLIC BLOOD PRESSURE: 130 MMHG | TEMPERATURE: 97.9 F | RESPIRATION RATE: 14 BRPM

## 2017-05-22 DIAGNOSIS — I10 ESSENTIAL HYPERTENSION: ICD-10-CM

## 2017-05-22 DIAGNOSIS — E11.8 TYPE 2 DIABETES MELLITUS WITH COMPLICATION, WITHOUT LONG-TERM CURRENT USE OF INSULIN (HCC): ICD-10-CM

## 2017-05-22 DIAGNOSIS — I25.10 ASHD (ARTERIOSCLEROTIC HEART DISEASE): ICD-10-CM

## 2017-05-22 DIAGNOSIS — E11.8 TYPE 2 DIABETES MELLITUS WITH COMPLICATION, WITHOUT LONG-TERM CURRENT USE OF INSULIN (HCC): Primary | ICD-10-CM

## 2017-05-22 LAB
ALBUMIN SERPL BCP-MCNC: 3.5 G/DL (ref 3.4–5)
ALBUMIN/GLOB SERPL: 0.9 {RATIO} (ref 0.8–1.7)
ALP SERPL-CCNC: 64 U/L (ref 45–117)
ALT SERPL-CCNC: 17 U/L (ref 13–56)
AST SERPL W P-5'-P-CCNC: 16 U/L (ref 15–37)
BILIRUB DIRECT SERPL-MCNC: 0.1 MG/DL (ref 0–0.2)
BILIRUB SERPL-MCNC: 0.5 MG/DL (ref 0.2–1)
EST. AVERAGE GLUCOSE BLD GHB EST-MCNC: 154 MG/DL
GLOBULIN SER CALC-MCNC: 3.7 G/DL (ref 2–4)
HBA1C MFR BLD: 7 % (ref 4.2–5.6)
PROT SERPL-MCNC: 7.2 G/DL (ref 6.4–8.2)

## 2017-05-22 PROCEDURE — 83036 HEMOGLOBIN GLYCOSYLATED A1C: CPT | Performed by: FAMILY MEDICINE

## 2017-05-22 PROCEDURE — 36415 COLL VENOUS BLD VENIPUNCTURE: CPT | Performed by: FAMILY MEDICINE

## 2017-05-22 PROCEDURE — 80076 HEPATIC FUNCTION PANEL: CPT | Performed by: FAMILY MEDICINE

## 2017-05-22 RX ORDER — AMLODIPINE BESYLATE 5 MG/1
7.5 TABLET ORAL DAILY
Qty: 135 TAB | Refills: 0 | Status: SHIPPED | OUTPATIENT
Start: 2017-05-22 | End: 2017-08-06 | Stop reason: SDUPTHER

## 2017-05-22 NOTE — MR AVS SNAPSHOT
Visit Information Date & Time Provider Department Dept. Phone Encounter #  
 5/22/2017  9:30 AM Nimo Perez MD Tina Lara [address] [phone] [id number] Upcoming Health Maintenance Date Due  
 EYE EXAM RETINAL OR DILATED Q1 5/19/2017 ZOSTER VACCINE AGE 60> 5/28/2017* FOOT EXAM Q1 5/28/2017* MICROALBUMIN Q1 5/28/2017* INFLUENZA AGE 9 TO ADULT 8/1/2017 HEMOGLOBIN A1C Q6M 8/20/2017 LIPID PANEL Q1 11/21/2017 MEDICARE YEARLY EXAM 11/22/2017 GLAUCOMA SCREENING Q2Y 5/19/2018 DTaP/Tdap/Td series (2 - Td) 2/24/2027 *Topic was postponed. The date shown is not the original due date. Allergies as of 5/22/2017  Review Complete On: 5/22/2017 By: Nimo Perez MD  
 No Known Allergies Current Immunizations  Reviewed on 2/20/2017 Name Date DTaP 2/24/2017 Influenza Vaccine (Quad) PF 12/21/2016 10:55 AM, 9/23/2015  9:50 AM  
 Pneumococcal Conjugate (PCV-13) 11/5/2015 12:37 PM  
 Pneumococcal Polysaccharide (PPSV-23) 11/23/2016 Not reviewed this visit You Were Diagnosed With   
  
 Codes Comments Type 2 diabetes mellitus with complication, without long-term current use of insulin (HCC)    -  Primary ICD-10-CM: E11.8 ICD-9-CM: 250.90 Essential hypertension     ICD-10-CM: I10 
ICD-9-CM: 401.9 ASHD (arteriosclerotic heart disease)     ICD-10-CM: I25.10 ICD-9-CM: 414.00 Vitals BP Pulse Temp Resp Height(growth percentile) Weight(growth percentile) 130/78 (BP 1 Location: Left arm, BP Patient Position: Sitting) 60 97.9 °F (36.6 °C) (Oral) 14 5' 1\" (1.549 m) 143 lb (64.9 kg) SpO2 BMI OB Status Smoking Status 96% 27.02 kg/m2 Menopause Former Smoker Vitals History BMI and BSA Data Body Mass Index Body Surface Area  
 27.02 kg/m 2 1.67 m 2 Preferred Pharmacy Pharmacy Name Phone  [facility] Cristian Amos. [phone] Your Updated Medication List  
  
   
This list is accurate as of: 5/22/17 10:12 AM.  Always use your most recent med list. amLODIPine 5 mg tablet Commonly known as:  Heriberto Burkitt Take 1.5 Tabs by mouth daily. aspirin delayed-release 81 mg tablet Take 81 mg by mouth daily. Blood-Glucose Meter monitoring kit Check glucose once daily. Re: DM .00  
  
 clopidogrel 75 mg Tab Commonly known as:  PLAVIX  
take 1 tablet by mouth once daily  
  
 glucose blood VI test strips strip Commonly known as:  blood glucose test  
Check glucose once daily. Re: DM .00 Lancets Misc Check glucose once daily. Re: DM .00  
  
 metoprolol tartrate 50 mg tablet Commonly known as:  LOPRESSOR  
take 1/2 tablet by mouth twice a day  
  
 simvastatin 5 mg tablet Commonly known as:  ZOCOR  
take 1 tablet by mouth at bedtime Prescriptions Sent to Pharmacy Refills  
 amLODIPine (NORVASC) 5 mg tablet 0 Sig: Take 1.5 Tabs by mouth daily. Class: Normal  
 Pharmacy: [facility]  #: [phone] Route: Oral  
  
Introducing [facility] & HEALTH SERVICES! [employer] introduces [facility] patient portal. Now you can access parts of your medical record, email your doctor's office, and request medication refills online. 1. In your internet browser, go to [email or web address] 2. Click on the First Time User? Click Here link in the Sign In box. You will see the New Member Sign Up page. 3. Enter your [facility] Access Code exactly as it appears below. You will not need to use this code after youve completed the sign-up process. If you do not sign up before the expiration date, you must request a new code. · [facility] Access Code: [email or web address] Expires: 8/20/2017 10:11 AM 
 
4.  Enter the last four digits of your Social Security Number (xxxx) and Date of Birth (mm/dd/yyyy) as indicated and click Submit. You will be taken to the next sign-up page. 5. Create a Ravel Law ID. This will be your [employer] login ID and cannot be changed, so think of one that is secure and easy to remember. 6. Create a R[employer] Law password. You can change your password at any time. 7. Enter your Password Reset Question and Answer. This can be used at a later time if you forget your password. 8. Enter your e-mail address. You will receive e-mail notification when new information is available in [address] 9. Click Sign Up. You can now view and download portions of your medical record. 10. Click the Download Summary menu link to download a portable copy of your medical information. If you have questions, please visit the Frequently Asked Questions section of the [employer] Law website. Remember, [employer] is NOT to be used for urgent needs. For medical emergencies, dial 911. Now available from your iPhone and Android! Please provide this summary of care documentation to your next provider. Your primary care clinician is listed as [address] [place] [address]. If you have any questions after today's visit, please call [phone].

## 2017-05-22 NOTE — PROGRESS NOTES
Nicolas Marti is a 80 y.o.  female presents today for office visit for follow up. Pt is in Room # 4      1. Have you been to the ER, urgent care clinic since your last visit? Hospitalized since your last visit? No    2. Have you seen or consulted any other health care providers outside of the [address] [place] [address] since your last visit? Include any pap smears or colon screening. No    No Patient Care Coordination Note on file.

## 2017-05-22 NOTE — PROGRESS NOTES
SUBJECTIVE:  Geovani Fernandez is a 80y.o. year old female   Chief Complaint   Patient presents with    Hypertension    Diabetes    Gout    Heart Problem       History of Present Illness:   She went to the ER on 3/26/17 for lip swelling. It was felt that the angioedema was from Diovan and it was discontinued. Her lip swelling has not returned. No additional complaints at this time. Her amlodipine was increased to 10 mg daily; but her BP at home was too low per her daughter who is an MA. Now she is on amlodipine 7.5 daily. She does not c/o any hypotensive symptoms. She is following ADA diet for DM II. She is not checking her glucose. No Systemic, Cardiovascular or Respiratory symptoms. Past Medical History:   Diagnosis Date    Arteriosclerotic heart disease (ASHD)     CKD (chronic kidney disease), stage III 6/23/2015    CVA (cerebral infarction)     Diabetes ([facility] [address]     Gout 9/23/13    Left Foot    Hypertension     Leg weakness, bilateral 3/18/13     History reviewed. No pertinent surgical history. Current Outpatient Prescriptions   Medication Sig    amLODIPine (NORVASC) 5 mg tablet Take 1.5 Tabs by mouth daily.  clopidogrel (PLAVIX) 75 mg tab take 1 tablet by mouth once daily    metoprolol tartrate (LOPRESSOR) 50 mg tablet take 1/2 tablet by mouth twice a day    simvastatin (ZOCOR) 5 mg tablet take 1 tablet by mouth at bedtime    Blood-Glucose Meter monitoring kit Check glucose once daily. Re: DM .00    glucose blood VI test strips (BLOOD GLUCOSE TEST) strip Check glucose once daily. Re: DM .00    Lancets misc Check glucose once daily. Re: DM .00    aspirin delayed-release 81 mg tablet Take 81 mg by mouth daily. No current facility-administered medications for this visit. No Known Allergies       Review of Systems:   Constitutional: No fever, chills, night sweats, malaise, dizziness.     Cardiovascular: No angina, palpitations, PND, orthopnea, lightheadedness, edema, claudication. Respiratory: No dyspnea, wheeze, pleurisy, hemoptysis, unusual cough or sputum. Gastrointestinal: no nausea/ vomiting, bowel habit change, pain, LI symptoms, melena, hematochezia, anorexia. Psychiatric: No agitation, confusion/disorientation, suicidal or homicidal ideation. Skin: no new complaints. OBJECTIVE:  Physical Exam:   Constitutional: General Appearance:  well developed, well nourished, nontoxic, in no acute distress. Visit Vitals    /78 (BP 1 Location: Left arm, BP Patient Position: Sitting)    Pulse 60    Temp 97.9 °F (36.6 °C) (Oral)    Resp 14    Ht 5' 1\" (1.549 m)    Wt 143 lb (64.9 kg)    SpO2 96%    BMI 27.02 kg/m2     ENT[de-identified] No lip swelling or tongue swelling. Nose: clear. Throat:  Clear. Pulmonary: Respiratory effort: normal; no dyspnea, no retractions, no accessory muscle use. Auscultation: normal & symmetrical air exchange; no rales, no rhonchi, no wheeze; no rubs    Cardiovascular[de-identified] Palpation of Heart: PMI not displaced or enlarged, no thrills, no heaves. Auscultation of Heart: RRR; G I/VI systolic murmur, no rubs, no gallops. Neck: carotid arteries- normal volume & without bruit; no JVD. Extremities: no edema, no active varicosity. GI[de-identified] Normal bowel sounds. No masses; no tenderness; no rebound/rigidity; no CVA tenderness. No hepatosplenomegaly. Psychiatric: Oriented to time, place and person. Musculoskeletal: NC/AT. Neck-supple.       Lab Results   Component Value Date/Time    WBC 7.6 11/21/2016 11:58 AM    HGB 12.5 11/21/2016 11:58 AM    HCT 46.5 11/21/2016 11:58 AM    PLATELET 375 84/19/0683 11:58 AM    MCV 91.7 11/21/2016 11:58 AM     Lab Results   Component Value Date/Time    Sodium 141 11/21/2016 11:58 AM    Potassium 4.7 11/21/2016 11:58 AM    Chloride 108 11/21/2016 11:58 AM    CO2 21 11/21/2016 11:58 AM    Anion gap 12 11/21/2016 11:58 AM    Glucose 93 11/21/2016 11:58 AM    BUN 17 11/21/2016 11:58 AM Creatinine 1.16 11/21/2016 11:58 AM    BUN/Creatinine ratio 15 11/21/2016 11:58 AM    GFR est AA 54 11/21/2016 11:58 AM    GFR est non-AA 44 11/21/2016 11:58 AM    Calcium 9.0 11/21/2016 11:58 AM    Bilirubin, total 0.3 07/28/2016 10:21 AM    AST (SGOT) 11 07/28/2016 10:21 AM    Alk. phosphatase 68 07/28/2016 10:21 AM    Protein, total 7.7 07/28/2016 10:21 AM    Albumin 3.5 07/28/2016 10:21 AM    Globulin 4.2 07/28/2016 10:21 AM    A-G Ratio 0.8 07/28/2016 10:21 AM    ALT (SGPT) 19 07/28/2016 10:21 AM     Lab Results   Component Value Date/Time    TSH 0.99 07/28/2016 10:21 AM     Lab Results   Component Value Date/Time    Cholesterol, total 113 11/21/2016 11:58 AM    HDL Cholesterol 59 11/21/2016 11:58 AM    LDL, calculated 39 11/21/2016 11:58 AM    VLDL, calculated 15 11/21/2016 11:58 AM    Triglyceride 75 11/21/2016 11:58 AM    CHOL/HDL Ratio 1.9 11/21/2016 11:58 AM     Lab Results   Component Value Date/Time    Hemoglobin A1c 6.7 07/28/2016 10:21 AM    Hemoglobin A1c (POC) 6.3 02/20/2017 10:23 AM       CBC WITH DIFFERENTIAL (03/26/2017 10:46 PM)   Component Value Range   WBC x 10*3 8.5 4.0-11.0 K/uL   RBC x 10^6 5. 14 3.80-5.20 M/uL   HGB 12.7 11.7-16.1 g/dL   HCT 39.9 35.1-48.3 %   MCV 78 (L) 80-95 fL   MCH 25 (L) 26-34 pg   MCHC 32 32-36 g/dL   RDW 16.4 (H) 10.0-16.0 %   Platelet 987 487-385 K/uL   MPV 11.3 (H) 6.0-10.8 fL   Segmented Neutrophils 74 40-75 %   Lymphocytes 15 (L) 27-45 %   Monocytes 7 3-9 %   Eosinophil 3 0-6 %   Basophils 0 0-2 %   Absolute Neutrophils 6.3 1.8-7.7 K/uL   Absolute Lymphocytes 1.3 1.0-4.8 K/uL   Absolute Monocyte Count 0.6 0.1-0.9 K/uL   Absolute Eosinophil 0.2 0.0-0.5 K/uL   Absolute Basophil Count 0.0 0.0-0.2 K/uL     BASIC METABOLIC PANEL (13/12/8485 10:46 PM)   Component Value Range   POTASSIUM 5.4 3.5-5.5 mmol/L   SODIUM 139 133-145 mmol/L   CHLORIDE 102  mmol/L   GLUCOSE 140 (H) 65-99 mg/dL   CALCIUM 9.3 8.4-10.5 mg/dL   BUN 20 6-22 mg/dL   CREATININE 0.9 0.8-1.4 mg/dL CO2 25 20-32 mmol/L   eGFR African American >60.0 >60.0   eGFR Non African American 55.8 (L) >60.0   ANION GAP 11.7 mmol/L        ASSESSMENT:     1. Type 2 diabetes mellitus with complication, without long-term current use of insulin ([address]    2. Essential hypertension    3. ASHD (arteriosclerotic heart disease)        PLAN:     Pharmacologic Management: Medications reviewed with the patient. No change now. Orders Placed This Encounter    HEPATIC FUNCTION PANEL    HEMOGLOBIN A1C WITH EAG    amLODIPine (NORVASC) 5 mg tablet     Discussed DDx, follow-up & work-up. Discussed risk/benefit & side effect of treatment. Follow up visit as scheduled in 3 months, prn sooner. PRN to ER. Low salt ADA diet & graduated excecise. Recommend home glucose monitoring. Call with BP reading PRN. Follow up with cardiology. Health risk from non adherence discussed. Patient/ daughter voiced understanding.      Jared Solomon MD

## 2017-05-23 ENCOUNTER — TELEPHONE (OUTPATIENT)
Dept: FAMILY MEDICINE CLINIC | Facility: CLINIC | Age: 82
End: 2017-05-23

## 2017-06-23 RX ORDER — CLOPIDOGREL BISULFATE 75 MG/1
TABLET ORAL
Qty: 90 TAB | Refills: 0 | Status: SHIPPED | OUTPATIENT
Start: 2017-06-23 | End: 2017-08-23 | Stop reason: SDUPTHER

## 2017-08-23 ENCOUNTER — OFFICE VISIT (OUTPATIENT)
Dept: FAMILY MEDICINE CLINIC | Facility: CLINIC | Age: 82
End: 2017-08-23

## 2017-08-23 ENCOUNTER — HOSPITAL ENCOUNTER (OUTPATIENT)
Dept: LAB | Age: 82
Discharge: HOME | End: 2017-08-23
Payer: MEDICARE

## 2017-08-23 VITALS
HEART RATE: 58 BPM | RESPIRATION RATE: 13 BRPM | BODY MASS INDEX: 26.66 KG/M2 | SYSTOLIC BLOOD PRESSURE: 136 MMHG | TEMPERATURE: 97.9 F | WEIGHT: 141.2 LBS | DIASTOLIC BLOOD PRESSURE: 70 MMHG | OXYGEN SATURATION: 98 % | HEIGHT: 61 IN

## 2017-08-23 DIAGNOSIS — N18.30 CKD (CHRONIC KIDNEY DISEASE), STAGE III (HCC): ICD-10-CM

## 2017-08-23 DIAGNOSIS — I25.10 ASHD (ARTERIOSCLEROTIC HEART DISEASE): ICD-10-CM

## 2017-08-23 DIAGNOSIS — H81.10 BPV (BENIGN POSITIONAL VERTIGO), UNSPECIFIED LATERALITY: ICD-10-CM

## 2017-08-23 DIAGNOSIS — I10 ESSENTIAL HYPERTENSION: ICD-10-CM

## 2017-08-23 DIAGNOSIS — L30.9 ECZEMA, UNSPECIFIED TYPE: ICD-10-CM

## 2017-08-23 DIAGNOSIS — R29.898 LEG WEAKNESS, BILATERAL: ICD-10-CM

## 2017-08-23 DIAGNOSIS — I69.993 CVA, OLD, ATAXIA: ICD-10-CM

## 2017-08-23 DIAGNOSIS — E11.8 TYPE 2 DIABETES MELLITUS WITH COMPLICATION, WITHOUT LONG-TERM CURRENT USE OF INSULIN (HCC): Primary | ICD-10-CM

## 2017-08-23 DIAGNOSIS — Z23 ENCOUNTER FOR IMMUNIZATION: ICD-10-CM

## 2017-08-23 DIAGNOSIS — E11.8 TYPE 2 DIABETES MELLITUS WITH COMPLICATION, WITHOUT LONG-TERM CURRENT USE OF INSULIN (HCC): ICD-10-CM

## 2017-08-23 LAB
ALBUMIN SERPL-MCNC: 3.6 G/DL (ref 3.4–5)
ALBUMIN/GLOB SERPL: 0.8 {RATIO} (ref 0.8–1.7)
ALP SERPL-CCNC: 68 U/L (ref 45–117)
ALT SERPL-CCNC: 17 U/L (ref 13–56)
ANION GAP SERPL CALC-SCNC: 8 MMOL/L (ref 3–18)
APPEARANCE UR: CLEAR
AST SERPL-CCNC: 15 U/L (ref 15–37)
BACTERIA URNS QL MICRO: NEGATIVE /HPF
BASOPHILS # BLD: 0 K/UL (ref 0–0.06)
BASOPHILS NFR BLD: 0 % (ref 0–2)
BILIRUB DIRECT SERPL-MCNC: 0.2 MG/DL (ref 0–0.2)
BILIRUB SERPL-MCNC: 0.6 MG/DL (ref 0.2–1)
BILIRUB UR QL: NEGATIVE
BUN SERPL-MCNC: 23 MG/DL (ref 7–18)
BUN/CREAT SERPL: 20 (ref 12–20)
CALCIUM SERPL-MCNC: 8.7 MG/DL (ref 8.5–10.1)
CHLORIDE SERPL-SCNC: 105 MMOL/L (ref 100–108)
CHOLEST SERPL-MCNC: 124 MG/DL
CO2 SERPL-SCNC: 26 MMOL/L (ref 21–32)
COLOR UR: YELLOW
CREAT SERPL-MCNC: 1.14 MG/DL (ref 0.6–1.3)
CREAT UR-MCNC: 228.76 MG/DL (ref 30–125)
DIFFERENTIAL METHOD BLD: ABNORMAL
EOSINOPHIL # BLD: 0.6 K/UL (ref 0–0.4)
EOSINOPHIL NFR BLD: 8 % (ref 0–5)
EPITH CASTS URNS QL MICRO: NORMAL /LPF (ref 0–5)
ERYTHROCYTE [DISTWIDTH] IN BLOOD BY AUTOMATED COUNT: 16.3 % (ref 11.6–14.5)
EST. AVERAGE GLUCOSE BLD GHB EST-MCNC: 148 MG/DL
GLOBULIN SER CALC-MCNC: 4.3 G/DL (ref 2–4)
GLUCOSE SERPL-MCNC: 103 MG/DL (ref 74–99)
GLUCOSE UR STRIP.AUTO-MCNC: NEGATIVE MG/DL
HBA1C MFR BLD: 6.8 % (ref 4.2–5.6)
HCT VFR BLD AUTO: 40.9 % (ref 35–45)
HDLC SERPL-MCNC: 68 MG/DL (ref 40–60)
HDLC SERPL: 1.8 {RATIO} (ref 0–5)
HGB BLD-MCNC: 13.2 G/DL (ref 12–16)
HGB UR QL STRIP: NEGATIVE
KETONES UR QL STRIP.AUTO: NEGATIVE MG/DL
LDLC SERPL CALC-MCNC: 35 MG/DL (ref 0–100)
LEUKOCYTE ESTERASE UR QL STRIP.AUTO: ABNORMAL
LIPID PROFILE,FLP: ABNORMAL
LYMPHOCYTES # BLD: 1.4 K/UL (ref 0.9–3.6)
LYMPHOCYTES NFR BLD: 19 % (ref 21–52)
MCH RBC QN AUTO: 25.2 PG (ref 24–34)
MCHC RBC AUTO-ENTMCNC: 32.3 G/DL (ref 31–37)
MCV RBC AUTO: 78.1 FL (ref 74–97)
MICROALBUMIN UR-MCNC: 172.3 MG/DL (ref 0–3)
MICROALBUMIN/CREAT UR-RTO: 753 MG/G (ref 0–30)
MONOCYTES # BLD: 0.5 K/UL (ref 0.05–1.2)
MONOCYTES NFR BLD: 7 % (ref 3–10)
NEUTS SEG # BLD: 4.9 K/UL (ref 1.8–8)
NEUTS SEG NFR BLD: 66 % (ref 40–73)
NITRITE UR QL STRIP.AUTO: NEGATIVE
PH UR STRIP: 5.5 [PH] (ref 5–8)
PHOSPHATE SERPL-MCNC: 3.4 MG/DL (ref 2.5–4.9)
PLATELET # BLD AUTO: 245 K/UL (ref 135–420)
PMV BLD AUTO: 11.5 FL (ref 9.2–11.8)
POTASSIUM SERPL-SCNC: 4.1 MMOL/L (ref 3.5–5.5)
PROT SERPL-MCNC: 7.9 G/DL (ref 6.4–8.2)
PROT UR STRIP-MCNC: 300 MG/DL
RBC # BLD AUTO: 5.24 M/UL (ref 4.2–5.3)
RBC #/AREA URNS HPF: 0 /HPF (ref 0–5)
SODIUM SERPL-SCNC: 139 MMOL/L (ref 136–145)
SP GR UR REFRACTOMETRY: 1.03 (ref 1–1.03)
TRIGL SERPL-MCNC: 105 MG/DL (ref ?–150)
TSH SERPL DL<=0.05 MIU/L-ACNC: 1.79 UIU/ML (ref 0.36–3.74)
URATE SERPL-MCNC: 7.8 MG/DL (ref 2.6–7.2)
UROBILINOGEN UR QL STRIP.AUTO: 0.2 EU/DL (ref 0.2–1)
VLDLC SERPL CALC-MCNC: 21 MG/DL
WBC # BLD AUTO: 7.4 K/UL (ref 4.6–13.2)
WBC URNS QL MICRO: NORMAL /HPF (ref 0–4)

## 2017-08-23 PROCEDURE — 85025 COMPLETE CBC W/AUTO DIFF WBC: CPT | Performed by: FAMILY MEDICINE

## 2017-08-23 PROCEDURE — 82043 UR ALBUMIN QUANTITATIVE: CPT | Performed by: FAMILY MEDICINE

## 2017-08-23 PROCEDURE — 84443 ASSAY THYROID STIM HORMONE: CPT | Performed by: FAMILY MEDICINE

## 2017-08-23 PROCEDURE — 80076 HEPATIC FUNCTION PANEL: CPT | Performed by: FAMILY MEDICINE

## 2017-08-23 PROCEDURE — 36415 COLL VENOUS BLD VENIPUNCTURE: CPT | Performed by: FAMILY MEDICINE

## 2017-08-23 PROCEDURE — 81001 URINALYSIS AUTO W/SCOPE: CPT | Performed by: FAMILY MEDICINE

## 2017-08-23 PROCEDURE — 84550 ASSAY OF BLOOD/URIC ACID: CPT | Performed by: FAMILY MEDICINE

## 2017-08-23 PROCEDURE — 80061 LIPID PANEL: CPT | Performed by: FAMILY MEDICINE

## 2017-08-23 PROCEDURE — 80048 BASIC METABOLIC PNL TOTAL CA: CPT | Performed by: FAMILY MEDICINE

## 2017-08-23 PROCEDURE — 84100 ASSAY OF PHOSPHORUS: CPT | Performed by: FAMILY MEDICINE

## 2017-08-23 PROCEDURE — 83036 HEMOGLOBIN GLYCOSYLATED A1C: CPT | Performed by: FAMILY MEDICINE

## 2017-08-23 RX ORDER — CLOPIDOGREL BISULFATE 75 MG/1
75 TABLET ORAL DAILY
Qty: 90 TAB | Refills: 0 | Status: SHIPPED | OUTPATIENT
Start: 2017-08-23 | End: 2017-12-10 | Stop reason: SDUPTHER

## 2017-08-23 RX ORDER — CANE TIPS
EACH MISCELLANEOUS
Qty: 1 DEVICE | Refills: 0 | Status: SHIPPED | OUTPATIENT
Start: 2017-08-23 | End: 2017-08-23 | Stop reason: SDUPTHER

## 2017-08-23 RX ORDER — TRIAMCINOLONE ACETONIDE 1 MG/G
OINTMENT TOPICAL 2 TIMES DAILY
COMMUNITY
End: 2017-08-23 | Stop reason: CLARIF

## 2017-08-23 RX ORDER — DESOXIMETASONE 2.5 MG/G
OINTMENT TOPICAL 2 TIMES DAILY
Qty: 100 G | Refills: 2 | Status: SHIPPED | OUTPATIENT
Start: 2017-08-23 | End: 2017-11-29

## 2017-08-23 RX ORDER — MECLIZINE HYDROCHLORIDE 25 MG/1
25 TABLET ORAL 3 TIMES DAILY
COMMUNITY
Start: 2017-07-12 | End: 2021-01-01

## 2017-08-23 RX ORDER — CANE TIPS
EACH MISCELLANEOUS
Qty: 1 DEVICE | Refills: 0 | Status: SHIPPED | OUTPATIENT
Start: 2017-08-23 | End: 2018-05-30 | Stop reason: ALTCHOICE

## 2017-08-23 NOTE — MR AVS SNAPSHOT
Visit Information Date & Time Provider Department Dept. Phone Encounter #  
 8/23/2017  9:30 AM Jaclyn Brown MD [facility] [phone] [id number] Upcoming Health Maintenance Date Due ZOSTER VACCINE AGE 60> 3/25/1990 FOOT EXAM Q1 4/28/2017 MICROALBUMIN Q1 4/28/2017 INFLUENZA AGE 9 TO ADULT 8/1/2017 LIPID PANEL Q1 11/21/2017 MEDICARE YEARLY EXAM 11/22/2017 HEMOGLOBIN A1C Q6M 11/22/2017 EYE EXAM RETINAL OR DILATED Q1 5/24/2018 GLAUCOMA SCREENING Q2Y 5/24/2019 DTaP/Tdap/Td series (2 - Td) 2/24/2027 Allergies as of 8/23/2017  Review Complete On: 8/23/2017 By: Jaclyn Brown MD  
  
 Severity Noted Reaction Type Reactions Valsartan High 07/12/2017    Anaphylaxis Current Immunizations  Reviewed on 2/20/2017 Name Date DTaP 2/24/2017 Influenza High Dose Vaccine PF  Incomplete Influenza Vaccine (Quad) PF 12/21/2016 10:55 AM, 9/23/2015  9:50 AM  
 Pneumococcal Conjugate (PCV-13) 11/5/2015 12:37 PM  
 Pneumococcal Polysaccharide (PPSV-23) 11/23/2016 Not reviewed this visit You Were Diagnosed With   
  
 Codes Comments Type 2 diabetes mellitus with complication, without long-term current use of insulin (HCC)    -  Primary ICD-10-CM: E11.8 ICD-9-CM: 250.90 Eczema, unspecified type     ICD-10-CM: L30.9 ICD-9-CM: 692.9 ASHD (arteriosclerotic heart disease)     ICD-10-CM: I25.10 ICD-9-CM: 414.00   
 CVA, old, ataxia     ICD-10-CM: G51.931 ICD-9-CM: 438.84 Encounter for immunization     ICD-10-CM: J87 ICD-9-CM: V03.89 Leg weakness, bilateral     ICD-10-CM: R29.898 ICD-9-CM: 729.89 Essential hypertension     ICD-10-CM: I10 
ICD-9-CM: 401.9 CKD (chronic kidney disease), stage III     ICD-10-CM: N18.3 ICD-9-CM: 181. 3 BPV (benign positional vertigo), unspecified laterality     ICD-10-CM: H81.10 ICD-9-CM: 386.11 Vitals BP Pulse Temp Resp Height(growth percentile) Weight(growth percentile) 136/70 (BP 1 Location: Right arm, BP Patient Position: Sitting) (!) 58 97.9 °F (36.6 °C) (Oral) 13 5' 1\" (1.549 m) 141 lb 3.2 oz (64 kg) SpO2 BMI OB Status Smoking Status 98% 26.68 kg/m2 Menopause Former Smoker Vitals History BMI and BSA Data Body Mass Index Body Surface Area  
 26.68 kg/m 2 1.66 m 2 Preferred Pharmacy Pharmacy Name Phone [id number] [address]  [phone] Your Updated Medication List  
  
   
This list is accurate as of: 8/23/17 10:54 AM.  Always use your most recent med list. amLODIPine 5 mg tablet Commonly known as:  Nerissa You Take 1.5 Tabs by mouth daily. aspirin delayed-release 81 mg tablet Take 81 mg by mouth daily. Blood-Glucose Meter monitoring kit Check glucose once daily. Re: DM .00 [employer] cane for gait difficulty  
  
 clopidogrel 75 mg Tab Commonly known as:  PLAVIX Take 1 Tab by mouth daily. desoximetasone 0.25 % ointment Commonly known as:  TOPICORT Apply  to affected area two (2) times a day. glucose blood VI test strips strip Commonly known as:  blood glucose test  
Check glucose once daily. Re: DM .00 Lancets Misc Check glucose once daily. Re: DM .00  
  
 meclizine 25 mg tablet Commonly known as:  ANTIVERT Take 25 mg by mouth three (3) times daily. metoprolol tartrate 50 mg tablet Commonly known as:  LOPRESSOR  
take 1/2 tablet by mouth twice a day  
  
 simvastatin 5 mg tablet Commonly known as:  ZOCOR  
take 1 tablet by mouth at bedtime Prescriptions Printed Refills Cane nely 0 Sig: Quad cane for gait difficulty Class: Print Prescriptions Sent to Pharmacy Refills  
 clopidogrel (PLAVIX) 75 mg tab 0 Sig: Take 1 Tab by mouth daily. Class: Normal  
 Pharmacy: [facility] Ph #: [phone] Route: Oral  
 desoximetasone (TOPICORT) 0.25 % ointment 2 Sig: Apply  to affected area two (2) times a day. Class: Normal  
 Pharmacy: [facility] Ph #: [phone] Route: Topical  
  
We Performed the Following ADMIN INFLUENZA VIRUS VAC [ [facility]] HM DIABETES FOOT EXAM [7 Custom] INFLUENZA VIRUS VACCINE, HIGH DOSE SEASONAL, PRESERVATIVE FREE [89839 CPT(R)] Patient Instructions Vaccine Information Statement Influenza (Flu) Vaccine (Inactivated or Recombinant): What you need to know Many Vaccine Information Statements are available in [place] and other languages. See www.immunize.org/vis Hojas de Información Sobre Vacunas están disponibles en Español y en muchos otros idiomas. Visite www.immunize.org/vis 1. Why get vaccinated? Influenza (flu) is a contagious disease that spreads around the [facility] every year, usually between October and May. Flu is caused by influenza viruses, and is spread mainly by coughing, sneezing, and close contact. Anyone can get flu. Flu strikes suddenly and can last several days. Symptoms vary by age, but can include: 
 fever/chills  sore throat  muscle aches  fatigue  cough  headache  runny or stuffy nose Flu can also lead to pneumonia and blood infections, and cause diarrhea and seizures in children. If you have a medical condition, such as heart or lung disease, flu can make it worse. Flu is more dangerous for some people. Infants and young children, people 72years of age and older, pregnant women, and people with certain health conditions or a weakened immune system are at greatest risk. Each year thousands of people in the [facility] die from flu, and many more are hospitalized. Flu vaccine can:  keep you from getting flu, 
 make flu less severe if you do get it, and 
 keep you from spreading flu to your family and other people. 2. Inactivated and recombinant flu vaccines A dose of flu vaccine is recommended every flu season. Children 6 months through 6years of age may need two doses during the same flu season. Everyone else needs only one dose each flu season. Some inactivated flu vaccines contain a very small amount of a mercury-based preservative called thimerosal. Studies have not shown thimerosal in vaccines to be harmful, but flu vaccines that do not contain thimerosal are available. There is no live flu virus in flu shots. They cannot cause the flu. There are many flu viruses, and they are always changing. Each year a new flu vaccine is made to protect against three or four viruses that are likely to cause disease in the upcoming flu season. But even when the vaccine doesnt exactly match these viruses, it may still provide some protection Flu vaccine cannot prevent: 
 flu that is caused by a virus not covered by the vaccine, or 
 illnesses that look like flu but are not. It takes about 2 weeks for protection to develop after vaccination, and protection lasts through the flu season. 3. Some people should not get this vaccine Tell the person who is giving you the vaccine:  If you have any severe, life-threatening allergies. If you ever had a life-threatening allergic reaction after a dose of flu vaccine, or have a severe allergy to any part of this vaccine, you may be advised not to get vaccinated. Most, but not all, types of flu vaccine contain a small amount of egg protein.  If you ever had Guillain-Barré Syndrome (also called GBS). Some people with a history of GBS should not get this vaccine. This should be discussed with your doctor.  If you are not feeling well. It is usually okay to get flu vaccine when you have a mild illness, but you might be asked to come back when you feel better. 4. Risks of a vaccine reaction With any medicine, including vaccines, there is a chance of reactions. These are usually mild and go away on their own, but serious reactions are also possible. Most people who get a flu shot do not have any problems with it. Minor problems following a flu shot include:  
 soreness, redness, or swelling where the shot was given  hoarseness  sore, red or itchy eyes  cough  fever  aches  headache  itching  fatigue If these problems occur, they usually begin soon after the shot and last 1 or 2 days. More serious problems following a flu shot can include the following:  There may be a small increased risk of Guillain-Barré Syndrome (GBS) after inactivated flu vaccine. This risk has been estimated at 1 or 2 additional cases per million people vaccinated. This is much lower than the risk of severe complications from flu, which can be prevented by flu vaccine.  Young children who get the flu shot along with pneumococcal vaccine (PCV13) and/or DTaP vaccine at the same time might be slightly more likely to have a seizure caused by fever. Ask your doctor for more information. Tell your doctor if a child who is getting flu vaccine has ever had a seizure. Problems that could happen after any injected vaccine:  People sometimes faint after a medical procedure, including vaccination. Sitting or lying down for about 15 minutes can help prevent fainting, and injuries caused by a fall. Tell your doctor if you feel dizzy, or have vision changes or ringing in the ears.  Some people get severe pain in the shoulder and have difficulty moving the arm where a shot was given. This happens very rarely.  Any medication can cause a severe allergic reaction.  Such reactions from a vaccine are very rare, estimated at about 1 in a million doses, and would happen within a few minutes to a few hours after the vaccination. As with any medicine, there is a very remote chance of a vaccine causing a serious injury or death. The safety of vaccines is always being monitored. For more information, visit: www.cdc.gov/vaccinesafety/ 
 
5. What if there is a serious reaction? What should I look for?  Look for anything that concerns you, such as signs of a severe allergic reaction, very high fever, or unusual behavior. Signs of a severe allergic reaction can include hives, swelling of the face and throat, difficulty breathing, a fast heartbeat, dizziness, and weakness  usually within a few minutes to a few hours after the vaccination. What should I do?  If you think it is a severe allergic reaction or other emergency that cant wait, call 9-1-1 and get the person to the nearest hospital. Otherwise, call your doctor.  Reactions should be reported to the Vaccine Adverse Event Reporting System (VAERS). Your doctor should file this report, or you can do it yourself through  the VAERS web site at www.vaers. [facility].gov, or by calling 5-800.911.8100. VAERS does not give medical advice. 6. The National Vaccine Injury Compensation Program 
 
The [facility] [employer] Vaccine Injury Compensation Program (VICP) is a federal program that was created to compensate people who may have been injured by certain vaccines. Persons who believe they may have been injured by a vaccine can learn about the program and about filing a claim by calling 6-196.566.9129 or visiting the [address] website at www.[facility].gov/vaccinecompensation. There is a time limit to file a claim for compensation. 7. How can I learn more?  Ask your healthcare provider. He or she can give you the vaccine package insert or suggest other sources of information.  Call your local or state health department.  Contact the Centers for Disease Control and Prevention (CDC): 
- Call 3-831.919.6885 (1-800-CDC-INFO) or 
- Visit CDCs website at www.cdc.gov/flu Vaccine Information Statement Inactivated Influenza Vaccine 8/7/2015 
42 CHANDRIKA Bingham [id number] [facility] and [employer] Centers for Disease Control and Prevention Office Use Only Introducing [facility]! [facility] introduces [employer] patient portal. Now you can access parts of your medical record, email your doctor's office, and request medication refills online. 1. In your internet browser, go to [email or web address]/"[email or web address] 2. Click on the First Time User? Click Here link in the Sign In box. You will see the New Member Sign Up page. 3. Enter your [employer] Access Code exactly as it appears below. You will not need to use this code after youve completed the sign-up process. If you do not sign up before the expiration date, you must request a new code. · [employer] Access Code: [email or web address] Expires: 11/21/2017  9:26 AM 
 
4. Enter the last four digits of your Social Security Number (xxxx) and Date of Birth (mm/dd/yyyy) as indicated and click Submit. You will be taken to the next sign-up page. 5. Create a [employer] ID. This will be your [employer] login ID and cannot be changed, so think of one that is secure and easy to remember. 6. Create a [employer] password. You can change your password at any time. 7. Enter your Password Reset Question and Answer. This can be used at a later time if you forget your password. 8. Enter your e-mail address. You will receive e-mail notification when new information is available in [address] 9. Click Sign Up. You can now view and download portions of your medical record. 10. Click the Download Summary menu link to download a portable copy of your medical information.  
 
If you have questions, please visit the Frequently Asked Questions section of the [employer]. Remember, [employer] is NOT to be used for urgent needs. For medical emergencies, dial 911. Now available from your iPhone and Android! Please provide this summary of care documentation to your next provider. Your primary care clinician is listed as [address] [place] [address]. If you have any questions after today's visit, please call [phone].

## 2017-08-23 NOTE — PATIENT INSTRUCTIONS
Vaccine Information Statement    Influenza (Flu) Vaccine (Inactivated or Recombinant): What you need to know    Many Vaccine Information Statements are available in [place] and other languages. See www.immunize.org/vis  Hojas de Información Sobre Vacunas están disponibles en Español y en muchos otros idiomas. Visite www.immunize.org/vis    1. Why get vaccinated? Influenza (flu) is a contagious disease that spreads around the [place] every year, usually between October and May. Flu is caused by influenza viruses, and is spread mainly by coughing, sneezing, and close contact. Anyone can get flu. Flu strikes suddenly and can last several days. Symptoms vary by age, but can include:   fever/chills   sore throat   muscle aches   fatigue   cough   headache    runny or stuffy nose    Flu can also lead to pneumonia and blood infections, and cause diarrhea and seizures in children. If you have a medical condition, such as heart or lung disease, flu can make it worse. Flu is more dangerous for some people. Infants and young children, people 72years of age and older, pregnant women, and people with certain health conditions or a weakened immune system are at greatest risk. Each year thousands of people in the [facility] die from flu, and many more are hospitalized. Flu vaccine can:   keep you from getting flu,   make flu less severe if you do get it, and   keep you from spreading flu to your family and other people. 2. Inactivated and recombinant flu vaccines    A dose of flu vaccine is recommended every flu season. Children 6 months through 6years of age may need two doses during the same flu season. Everyone else needs only one dose each flu season.        Some inactivated flu vaccines contain a very small amount of a mercury-based preservative called thimerosal. Studies have not shown thimerosal in vaccines to be harmful, but flu vaccines that do not contain thimerosal are available. There is no live flu virus in flu shots. They cannot cause the flu. There are many flu viruses, and they are always changing. Each year a new flu vaccine is made to protect against three or four viruses that are likely to cause disease in the upcoming flu season. But even when the vaccine doesnt exactly match these viruses, it may still provide some protection    Flu vaccine cannot prevent:   flu that is caused by a virus not covered by the vaccine, or   illnesses that look like flu but are not. It takes about 2 weeks for protection to develop after vaccination, and protection lasts through the flu season. 3. Some people should not get this vaccine    Tell the person who is giving you the vaccine:     If you have any severe, life-threatening allergies. If you ever had a life-threatening allergic reaction after a dose of flu vaccine, or have a severe allergy to any part of this vaccine, you may be advised not to get vaccinated. Most, but not all, types of flu vaccine contain a small amount of egg protein.  If you ever had Guillain-Barré Syndrome (also called GBS). Some people with a history of GBS should not get this vaccine. This should be discussed with your doctor.  If you are not feeling well. It is usually okay to get flu vaccine when you have a mild illness, but you might be asked to come back when you feel better. 4. Risks of a vaccine reaction    With any medicine, including vaccines, there is a chance of reactions. These are usually mild and go away on their own, but serious reactions are also possible. Most people who get a flu shot do not have any problems with it.      Minor problems following a flu shot include:    soreness, redness, or swelling where the shot was given     hoarseness   sore, red or itchy eyes   cough   fever   aches   headache   itching   fatigue  If these problems occur, they usually begin soon after the shot and last 1 or 2 days. More serious problems following a flu shot can include the following:     There may be a small increased risk of Guillain-Barré Syndrome (GBS) after inactivated flu vaccine. This risk has been estimated at 1 or 2 additional cases per million people vaccinated. This is much lower than the risk of severe complications from flu, which can be prevented by flu vaccine.  Young children who get the flu shot along with pneumococcal vaccine (PCV13) and/or DTaP vaccine at the same time might be slightly more likely to have a seizure caused by fever. Ask your doctor for more information. Tell your doctor if a child who is getting flu vaccine has ever had a seizure. Problems that could happen after any injected vaccine:      People sometimes faint after a medical procedure, including vaccination. Sitting or lying down for about 15 minutes can help prevent fainting, and injuries caused by a fall. Tell your doctor if you feel dizzy, or have vision changes or ringing in the ears.  Some people get severe pain in the shoulder and have difficulty moving the arm where a shot was given. This happens very rarely.  Any medication can cause a severe allergic reaction. Such reactions from a vaccine are very rare, estimated at about 1 in a million doses, and would happen within a few minutes to a few hours after the vaccination. As with any medicine, there is a very remote chance of a vaccine causing a serious injury or death. The safety of vaccines is always being monitored. For more information, visit: www.cdc.gov/vaccinesafety/    5. What if there is a serious reaction? What should I look for?  Look for anything that concerns you, such as signs of a severe allergic reaction, very high fever, or unusual behavior.     Signs of a severe allergic reaction can include hives, swelling of the face and throat, difficulty breathing, a fast heartbeat, dizziness, and weakness - usually within a few minutes to a few hours after the vaccination. What should I do?  If you think it is a severe allergic reaction or other emergency that cant wait, call 9-1-1 and get the person to the nearest hospital. Otherwise, call your doctor.  Reactions should be reported to the Vaccine Adverse Event Reporting System (VAERS). Your doctor should file this report, or you can do it yourself through  the VAERS web site at www.vaers. [facility].gov, or by calling 2-665.628.8142. VAERS does not give medical advice. 6. The National Vaccine Injury Compensation Program    The [facility] Vaccine Injury Compensation Program (VICP) is a federal program that was created to compensate people who may have been injured by certain vaccines. Persons who believe they may have been injured by a vaccine can learn about the program and about filing a claim by calling 7-482.436.8800 or visiting the [facility] website at www.[facility].gov/vaccinecompensation. There is a time limit to file a claim for compensation. 7. How can I learn more?  Ask your healthcare provider. He or she can give you the vaccine package insert or suggest other sources of information.  Call your local or state health department.  Contact the Centers for Disease Control and Prevention (CDC):  - Call 2-475.667.3931 (1-800-CDC-INFO) or  - Visit CDCs website at www.cdc.gov/flu    Vaccine Information Statement   Inactivated Influenza Vaccine   8/7/2015  42 CHANDRIKA Jonathan Ramirez [id number]    Department of Health and Human Services  Centers for Disease Control and Prevention    Office Use Only

## 2017-08-23 NOTE — PROGRESS NOTES
SUBJECTIVE:  Valeria Cohn is a 80y.o. year old female   Chief Complaint   Patient presents with    Hypertension    Diabetes    Gout    Heart Problem    Difficulty Walking       History of Present Illness:   She went to the ER on 7/12/17 for dizziness. She was sent home with Antivert for BPV after stable w/u. Since then she has not had any episodes of dizziness. Her amlodipine was increased to 10 mg daily; but her BP at home was too low per her daughter who is an MA. Now she is on amlodipine 7.5 daily. She does not c/o any hypotensive symptoms. She is following ADA diet for DM II. She is not checking her glucose. Her rash did not get better with TCA oint. She stopped using it. She is not using moisturizing soap. She has difficulty walking without assistance. She would like to get a cane. No Systemic, Cardiovascular or Respiratory symptoms. Past Medical History:   Diagnosis Date    Arteriosclerotic heart disease (ASHD)     CKD (chronic kidney disease), stage III 6/23/2015    CVA (cerebral infarction)     Diabetes ([facility] [address]     Gout 9/23/13    Left Foot    Hypertension     Leg weakness, bilateral 3/18/13     History reviewed. No pertinent surgical history. Current Outpatient Prescriptions   Medication Sig    amLODIPine (NORVASC) 5 mg tablet Take 1.5 Tabs by mouth daily.  clopidogrel (PLAVIX) 75 mg tab take 1 tablet by mouth once daily    metoprolol tartrate (LOPRESSOR) 50 mg tablet take 1/2 tablet by mouth twice a day    simvastatin (ZOCOR) 5 mg tablet take 1 tablet by mouth at bedtime    aspirin delayed-release 81 mg tablet Take 81 mg by mouth daily.  meclizine (ANTIVERT) 25 mg tablet Take 25 mg by mouth three (3) times daily.  triamcinolone acetonide (KENALOG) 0.1 % ointment Apply  to affected area two (2) times a day. use thin layer    Blood-Glucose Meter monitoring kit Check glucose once daily.   Re: DM .00    glucose blood VI test strips (BLOOD GLUCOSE TEST) strip Check glucose once daily. Re: DM .00    Lancets misc Check glucose once daily. Re: DM .00     No current facility-administered medications for this visit. Allergies   Allergen Reactions    Valsartan Anaphylaxis          Review of Systems:   Constitutional: No fever, chills, night sweats, malaise, dizziness. Cardiovascular: No angina, palpitations, PND, orthopnea, lightheadedness, edema, claudication. Respiratory: No dyspnea, wheeze, pleurisy, hemoptysis, unusual cough or sputum. Gastrointestinal: no nausea/ vomiting, bowel habit change, pain, LI symptoms, melena, hematochezia, anorexia. Psychiatric: No agitation, confusion/disorientation, suicidal or homicidal ideation. Skin: as above. OBJECTIVE:  Physical Exam:   Constitutional: General Appearance:  well developed, well nourished, nontoxic, in no acute distress. Visit Vitals    /70 (BP 1 Location: Right arm, BP Patient Position: Sitting)    Pulse (!) 58    Temp 97.9 °F (36.6 °C) (Oral)    Resp 13    Ht 5' 1\" (1.549 m)    Wt 141 lb 3.2 oz (64 kg)    SpO2 98%    BMI 26.68 kg/m2     ENT[de-identified] No lip swelling or tongue swelling. Nose: clear. Throat:  Clear. Pulmonary: Respiratory effort: normal; no dyspnea, no retractions, no accessory muscle use. Auscultation: normal & symmetrical air exchange; no rales, no rhonchi, no wheeze; no rubs    Cardiovascular[de-identified] Palpation of Heart: PMI not displaced or enlarged, no thrills, no heaves. Auscultation of Heart: RRR; G I/VI systolic murmur, no rubs, no gallops. Neck: carotid arteries- normal volume & without bruit; no JVD. Extremities: no edema, no active varicosity. GI[de-identified] Normal bowel sounds. No masses; no tenderness; no rebound/rigidity; no CVA tenderness. No hepatosplenomegaly. Psychiatric: Oriented to time, place and person. Musculoskeletal: NC/AT. Neck-supple. Walking is slow.     Diabetic foot exam:     Left: Reflexes 2+     Vibratory sensation normal    Proprioception normal   Sharp/dull discrimination normal    Filament test normal sensation with micro filament   Pulse DP: 1+ (weak)   Pulse PT: 1+ (weak)   Deformities: Mild - bunion  Right: Reflexes 2+   Vibratory sensation normal   Proprioception normal   Sharp/dull discrimination normal   Filament test normal sensation with micro filament   Pulse DP: 1+ (weak)   Pulse PT: 1+ (weak)   Deformities: Mild - bunion    Lab Results   Component Value Date/Time    WBC 7.6 11/21/2016 11:58 AM    HGB 12.5 11/21/2016 11:58 AM    HCT 46.5 11/21/2016 11:58 AM    PLATELET 524 51/83/4220 11:58 AM    MCV 91.7 11/21/2016 11:58 AM     Lab Results   Component Value Date/Time    Sodium 141 11/21/2016 11:58 AM    Potassium 4.7 11/21/2016 11:58 AM    Chloride 108 11/21/2016 11:58 AM    CO2 21 11/21/2016 11:58 AM    Anion gap 12 11/21/2016 11:58 AM    Glucose 93 11/21/2016 11:58 AM    BUN 17 11/21/2016 11:58 AM    Creatinine 1.16 11/21/2016 11:58 AM    BUN/Creatinine ratio 15 11/21/2016 11:58 AM    GFR est AA 54 11/21/2016 11:58 AM    GFR est non-AA 44 11/21/2016 11:58 AM    Calcium 9.0 11/21/2016 11:58 AM    Bilirubin, total 0.5 05/22/2017 10:06 AM    AST (SGOT) 16 05/22/2017 10:06 AM    Alk.  phosphatase 64 05/22/2017 10:06 AM    Protein, total 7.2 05/22/2017 10:06 AM    Albumin 3.5 05/22/2017 10:06 AM    Globulin 3.7 05/22/2017 10:06 AM    A-G Ratio 0.9 05/22/2017 10:06 AM    ALT (SGPT) 17 05/22/2017 10:06 AM     Lab Results   Component Value Date/Time    TSH 0.99 07/28/2016 10:21 AM     Lab Results   Component Value Date/Time    Cholesterol, total 113 11/21/2016 11:58 AM    HDL Cholesterol 59 11/21/2016 11:58 AM    LDL, calculated 39 11/21/2016 11:58 AM    VLDL, calculated 15 11/21/2016 11:58 AM    Triglyceride 75 11/21/2016 11:58 AM    CHOL/HDL Ratio 1.9 11/21/2016 11:58 AM     Lab Results   Component Value Date/Time    Hemoglobin A1c 7.0 05/22/2017 10:06 AM    Hemoglobin A1c (POC) 6.3 02/20/2017 10:23 AM CBC WITH DIFFERENTIAL (03/26/2017 10:46 PM)   Component Value Range   WBC x 10*3 8.5 4.0-11.0 K/uL   RBC x 10^6 5. 14 3.80-5.20 M/uL   HGB 12.7 11.7-16.1 g/dL   HCT 39.9 35.1-48.3 %   MCV 78 (L) 80-95 fL   MCH 25 (L) 26-34 pg   MCHC 32 32-36 g/dL   RDW 16.4 (H) 10.0-16.0 %   Platelet 760 593-017 K/uL   MPV 11.3 (H) 6.0-10.8 fL   Segmented Neutrophils 74 40-75 %   Lymphocytes 15 (L) 27-45 %   Monocytes 7 3-9 %   Eosinophil 3 0-6 %   Basophils 0 0-2 %   Absolute Neutrophils 6.3 1.8-7.7 K/uL   Absolute Lymphocytes 1.3 1.0-4.8 K/uL   Absolute Monocyte Count 0.6 0.1-0.9 K/uL   Absolute Eosinophil 0.2 0.0-0.5 K/uL   Absolute Basophil Count 0.0 0.0-0.2 K/uL     BASIC METABOLIC PANEL (97/53/8016 10:46 PM)   Component Value Range   POTASSIUM 5.4 3.5-5.5 mmol/L   SODIUM 139 133-145 mmol/L   CHLORIDE 102  mmol/L   GLUCOSE 140 (H) 65-99 mg/dL   CALCIUM 9.3 8.4-10.5 mg/dL   BUN 20 6-22 mg/dL   CREATININE 0.9 0.8-1.4 mg/dL   CO2 25 20-32 mmol/L   eGFR African American >60.0 >60.0   eGFR Non African American 55.8 (L) >60.0   ANION GAP 11.7 mmol/L        ASSESSMENT:     1. Type 2 diabetes mellitus with complication, without long-term current use of insulin (HCC)    2. Eczema, unspecified type    3. ASHD (arteriosclerotic heart disease)    4. CVA, old, ataxia    5. Encounter for immunization    6. Leg weakness, bilateral    7. Essential hypertension    8. CKD (chronic kidney disease), stage III    9. BPV (benign positional vertigo), unspecified laterality        PLAN:     Pharmacologic Management: Medications reviewed with the patient. Change TCA to Topicort ointment bid.     Orders Placed This Encounter    Influenza virus vaccine (FLUZONE HIGH-DOSE) 65 years and older    MICROALBUMIN, UR, RAND W/ MICROALBUMIN/CREA RATIO    LIPID PANEL    CBC WITH AUTOMATED DIFF    TSH 3RD GENERATION    URINALYSIS W/ RFLX MICROSCOPIC    URIC ACID    RENAL FUNCTION PANEL    HEPATIC FUNCTION PANEL    HEMOGLOBIN A1C WITH EAG    HM DIABETES FOOT EXAM    Administration fee () for Medicare insured patients    meclizine (ANTIVERT) 25 mg tablet    DISCONTD: triamcinolone acetonide (KENALOG) 0.1 % ointment    clopidogrel (PLAVIX) 75 mg tab    desoximetasone (TOPICORT) 0.25 % ointment    DISCONTD: Rex mckay     Discussed DDx, follow-up & work-up. Discussed risk/benefit & side effect of treatment. Follow up visit as scheduled in 3 months, prn sooner. PRN to ER. Low salt ADA diet & graduated excecise. Skin care discussed. Recommend home glucose monitoring. Call with BP reading PRN. Follow up with cardiology. Health risk from non adherence discussed. Patient voiced understanding.      Piero Justice MD

## 2017-08-23 NOTE — PROGRESS NOTES
Luigi Casey is a 80 y.o.  female presents today for office visit for routine follow up. Pt is in Room # 5. This patient is accompanied in the office by her daughter. 1. Have you been to the ER, urgent care clinic since your last visit? Hospitalized since your last visit? Yes Manda Bean 7/12/17 for Dizziness    2. Have you seen or consulted any other health care providers outside of the [address] [place] [address] since your last visit? Include any pap smears or colon screening. Yes Endoscopy 8/1/17    Health Maintenance reviewed. Upcoming Appts  Cardiology, Dr. Sabine Andersen - 9/2017      Luigi Casey is a 80 y.o. female who presents for routine immunizations. She denies any symptoms , reactions or allergies that would exclude them from being immunized today. Risks and adverse reactions were discussed and the VIS was given to them. All questions were addressed. She was observed for 10 min post injection. There were no reactions observed.     [address]

## 2017-08-24 ENCOUNTER — HOSPITAL ENCOUNTER (OUTPATIENT)
Dept: LAB | Age: 82
Discharge: HOME | End: 2017-08-24
Payer: MEDICARE

## 2017-08-24 DIAGNOSIS — R82.81 PYURIA: Primary | ICD-10-CM

## 2017-08-24 DIAGNOSIS — R82.81 PYURIA: ICD-10-CM

## 2017-08-24 PROCEDURE — 87086 URINE CULTURE/COLONY COUNT: CPT | Performed by: FAMILY MEDICINE

## 2017-08-25 ENCOUNTER — TELEPHONE (OUTPATIENT)
Dept: FAMILY MEDICINE CLINIC | Facility: CLINIC | Age: 82
End: 2017-08-25

## 2017-08-25 NOTE — TELEPHONE ENCOUNTER
Creatinine and GFR are stable. HgbA1c is stable and in target of below 7; but urine microalbumin and protein were higher.  Will need to decrease blood pressure.  Recommend increasing Norvasc to 10 mg daily and follow up in one month.  Medications like valsartan is better for this; but she has severe reaction from it. Uric acid is quite higher than last year. Alin Pal follow low purine diet to prevent gout. Spoke with pt's daughter in regards to results. Pt acknowledges understanding and voices no concerns at this time.

## 2017-08-26 LAB
BACTERIA SPEC CULT: NORMAL
SERVICE CMNT-IMP: NORMAL

## 2017-08-31 ENCOUNTER — TELEPHONE (OUTPATIENT)
Dept: FAMILY MEDICINE CLINIC | Facility: CLINIC | Age: 82
End: 2017-08-31

## 2017-08-31 NOTE — TELEPHONE ENCOUNTER
Urine culture did not indicate an infection. Called pt and left message. Call back number left and I myself or one of the other nurses will attempt to contact again.     The call was to inform pt results

## 2017-11-29 ENCOUNTER — OFFICE VISIT (OUTPATIENT)
Dept: FAMILY MEDICINE CLINIC | Facility: CLINIC | Age: 82
End: 2017-11-29

## 2017-11-29 VITALS
OXYGEN SATURATION: 99 % | SYSTOLIC BLOOD PRESSURE: 134 MMHG | BODY MASS INDEX: 27.94 KG/M2 | TEMPERATURE: 97.7 F | WEIGHT: 148 LBS | HEIGHT: 61 IN | DIASTOLIC BLOOD PRESSURE: 62 MMHG | RESPIRATION RATE: 15 BRPM | HEART RATE: 70 BPM

## 2017-11-29 DIAGNOSIS — Z00.00 MEDICARE ANNUAL WELLNESS VISIT, SUBSEQUENT: ICD-10-CM

## 2017-11-29 DIAGNOSIS — Z13.31 NEGATIVE DEPRESSION SCREENING: ICD-10-CM

## 2017-11-29 DIAGNOSIS — N18.30 CKD (CHRONIC KIDNEY DISEASE), STAGE III (HCC): ICD-10-CM

## 2017-11-29 DIAGNOSIS — I25.10 ASHD (ARTERIOSCLEROTIC HEART DISEASE): ICD-10-CM

## 2017-11-29 DIAGNOSIS — I10 ESSENTIAL HYPERTENSION: ICD-10-CM

## 2017-11-29 DIAGNOSIS — E11.8 TYPE 2 DIABETES MELLITUS WITH COMPLICATION, WITHOUT LONG-TERM CURRENT USE OF INSULIN (HCC): Primary | ICD-10-CM

## 2017-11-29 NOTE — PROGRESS NOTES
Katy Guerin is a 80 y.o. female and presents for annual Medicare Wellness Visit. Problem List: Reviewed with patient and discussed risk factors. Patient Active Problem List   Diagnosis Code    Gout M10.9    Leg weakness, bilateral R29.898    Hypertension I10    Diabetes ([facility] [address] E11.9    CKD (chronic kidney disease), stage III N18.3    ASHD (arteriosclerotic heart disease) I25.10    Heart murmur R01.1    Eczema L30.9    BPV (benign positional vertigo) H81.10       Current medical providers:  Patient Care Team:  Mariel Martínez MD as PCP - General (Family Practice)  Emma Calvert MD as Physician (Oncology)  Hadley Huffman, OLIVER Khan MD as Physician (Cardiology)    PMH: Reviewed with patient  Past Medical History:   Diagnosis Date    Arteriosclerotic heart disease (ASHD)     CKD (chronic kidney disease), stage III 6/23/2015    CVA (cerebral infarction)     Diabetes ([facility] [address]     Gout 9/23/13    Left Foot    Hypertension     Leg weakness, bilateral 3/18/13       PSH: Reviewed with patient  History reviewed. No pertinent surgical history. SH: Reviewed with patient  Social History   Substance Use Topics    Smoking status: Former Smoker     Quit date: 1/1/2005    Smokeless tobacco: Never Used      Comment: does not remember how many years for smoking/packs     Alcohol use No       FH: Reviewed with patient  History reviewed. No pertinent family history. Medications/Allergies: Reviewed with patient  Current Outpatient Prescriptions on File Prior to Visit   Medication Sig Dispense Refill    clopidogrel (PLAVIX) 75 mg tab Take 1 Tab by mouth daily. 90 Tab 0    Cane nely Quad cane for gait difficulty 1 Device 0    amLODIPine (NORVASC) 5 mg tablet Take 1.5 Tabs by mouth daily.  (Patient taking differently: Take 10 mg by mouth daily.) 135 Tab 0    metoprolol tartrate (LOPRESSOR) 50 mg tablet take 1/2 tablet by mouth twice a day 90 Tab 3    simvastatin (ZOCOR) 5 mg tablet take 1 tablet by mouth at bedtime 90 Tab 3    Blood-Glucose Meter monitoring kit Check glucose once daily. Re: DM .00 1 Kit 0    glucose blood VI test strips (BLOOD GLUCOSE TEST) strip Check glucose once daily. Re: DM .00 1 Each 11    Lancets misc Check glucose once daily. Re: DM .00 1 Each 11    aspirin delayed-release 81 mg tablet Take 81 mg by mouth daily.  meclizine (ANTIVERT) 25 mg tablet Take 25 mg by mouth three (3) times daily. No current facility-administered medications on file prior to visit. Allergies   Allergen Reactions    Valsartan Anaphylaxis       Objective:  Visit Vitals    /62 (BP 1 Location: Right arm, BP Patient Position: Sitting)    Pulse 70    Temp 97.7 °F (36.5 °C) (Oral)    Resp 15    Ht 5' 1\" (1.549 m)    Wt 148 lb (67.1 kg)    SpO2 99%    BMI 27.96 kg/m2    Body mass index is 27.96 kg/(m^2). Assessment of cognitive impairment:   Alert and oriented x 3  Oriented to situation? yes  Memory Problem? no  Cognition Problem? no    Depression Screen:   PHQ over the last two weeks 11/29/2017   Little interest or pleasure in doing things Not at all   Feeling down, depressed or hopeless Not at all   Total Score PHQ 2 0       Abuse Screen:   Abuse Screening Questionnaire 11/29/2017   Do you ever feel afraid of your partner? N   Are you in a relationship with someone who physically or mentally threatens you? N   Is it safe for you to go home? Y       Fall Risk Assessment:    Fall Risk Assessment, last 12 mths 11/29/2017   Able to walk? Yes   Fall in past 12 months? No       Functional Ability:   Does the patient exhibit a steady gait? No- chronic slow gait. How long did it take the patient to get up and walk from a sitting position? 2 sec   Is the patient self reliant?  (ie can do own laundry, meals, household chores)  yes     Does the patient handle his/her own medications? No     Does the patient handle his/her own money?    yes Is the patients home safe (ie good lighting, handrails on stairs and bath, etc.)? yes     Did you notice or did patient express any hearing difficulties? no     Did you notice of did patient express any vision difficulties?   no     Were distance and reading eye charts used? yes       Advance Care Planning:   Patient was offered the opportunity to discuss advance care planning:  yes     Does patient have an Advance Directive:  no   If no, did you provide information on [facility]? yes       Plan:        Health Maintenance   Topic Date Due    MEDICARE YEARLY EXAM  11/22/2017    HEMOGLOBIN A1C Q6M  02/23/2018    EYE EXAM RETINAL OR DILATED Q1  05/24/2018    FOOT EXAM Q1  08/23/2018    MICROALBUMIN Q1  08/23/2018    LIPID PANEL Q1  08/23/2018    GLAUCOMA SCREENING Q2Y  05/24/2019    DTaP/Tdap/Td series (2 - Td) 02/24/2027    OSTEOPOROSIS SCREENING (DEXA)  Completed    ZOSTER VACCINE AGE 60>  Addressed    Pneumococcal 65+ Low/Medium Risk  Completed    Influenza Age 5 to Adult  Completed       *Patient verbalized understanding and agreement with the plan. A copy of the After Visit Summary with personalized health plan was given to the patient today.

## 2017-11-29 NOTE — MR AVS SNAPSHOT
Visit Information Date & Time Provider Department Dept. Phone Encounter #  
 11/29/2017 11:00 AM Imtiaz Blackburn MD [facility] [phone] [id number] Follow-up Instructions Return in about 3 months (around 2/28/2018). Upcoming Health Maintenance Date Due  
 MEDICARE YEARLY EXAM 11/22/2017 HEMOGLOBIN A1C Q6M 2/23/2018 EYE EXAM RETINAL OR DILATED Q1 5/24/2018 FOOT EXAM Q1 8/23/2018 MICROALBUMIN Q1 8/23/2018 LIPID PANEL Q1 8/23/2018 GLAUCOMA SCREENING Q2Y 5/24/2019 DTaP/Tdap/Td series (2 - Td) 2/24/2027 Allergies as of 11/29/2017  Review Complete On: 11/29/2017 By: Imtiaz Blackburn MD  
  
 Severity Noted Reaction Type Reactions Valsartan High 07/12/2017    Anaphylaxis Current Immunizations  Reviewed on 2/20/2017 Name Date DTaP 2/24/2017 Influenza High Dose Vaccine PF 8/24/2017  9:30 AM  
 Influenza Vaccine (Quad) PF 12/21/2016 10:55 AM, 9/23/2015  9:50 AM  
 Pneumococcal Conjugate (PCV-13) 11/5/2015 12:37 PM  
 Pneumococcal Polysaccharide (PPSV-23) 11/23/2016 Not reviewed this visit You Were Diagnosed With   
  
 Codes Comments Negative depression screening    -  Primary ICD-10-CM: Z13.89 ICD-9-CM: V79.0 Vitals BP Pulse Temp Resp Height(growth percentile) Weight(growth percentile) 134/62 (BP 1 Location: Right arm, BP Patient Position: Sitting) 70 97.7 °F (36.5 °C) (Oral) 15 5' 1\" (1.549 m) 148 lb (67.1 kg) SpO2 BMI OB Status Smoking Status 99% 27.96 kg/m2 Menopause Former Smoker Vitals History BMI and BSA Data Body Mass Index Body Surface Area  
 27.96 kg/m 2 1.7 m 2 Preferred Pharmacy Pharmacy Name Phone [id number] [address] [phone] Your Updated Medication List  
  
   
This list is accurate as of: 11/29/17 12:10 PM.  Always use your most recent med list.  
  
  
  
  
 amLODIPine 5 mg tablet Commonly known as:  Yulissa Croon Take 1.5 Tabs by mouth daily. aspirin delayed-release 81 mg tablet Take 81 mg by mouth daily. Blood-Glucose Meter monitoring kit Check glucose once daily. Re: DM .00 [employer] cane for gait difficulty  
  
 clopidogrel 75 mg Tab Commonly known as:  PLAVIX Take 1 Tab by mouth daily. glucose blood VI test strips strip Commonly known as:  blood glucose test  
Check glucose once daily. Re: DM .00 Lancets Misc Check glucose once daily. Re: DM .00  
  
 meclizine 25 mg tablet Commonly known as:  ANTIVERT Take 25 mg by mouth three (3) times daily. metoprolol tartrate 50 mg tablet Commonly known as:  LOPRESSOR  
take 1/2 tablet by mouth twice a day  
  
 simvastatin 5 mg tablet Commonly known as:  ZOCOR  
take 1 tablet by mouth at bedtime Follow-up Instructions Return in about 3 months (around 2/28/2018). Patient Instructions Schedule of Personalized Health Plan for [facility] (Provide Copy to Patient)  
11/29/17 The best way to stay healthy is to live a healthy lifestyle. A healthy lifestyle includes regular exercise, eating a well-balanced diet, keeping a healthy weight and not smoking. Regular physical exams and screening tests are another important way to take care of yourself. Preventive exams provided by health care providers can find health problems early when treatment works best and can keep you from getting certain diseases or illnesses. Preventive services include exams, lab tests, screenings, shots, monitoring and information to help you take care of your own health. All people over 65 should have a pneumonia shot. Pneumonia shots are usually only needed once in a lifetime unless your doctor decides differently. All people over 65 should have a yearly flu shot. People over 65 are at medium to high risk for Hepatitis B. Three shots are needed for complete protection. In addition to your physical exam, some screening tests are recommended: 
 
Bone mass measurement (dexa scan) is recommended every two years Diabetes Mellitus screening is recommended every year. Glaucoma is an eye disease caused by high pressure in the eye. An eye exam is recommended every year. Cardiovascular screening tests that check your cholesterol and other blood fat (lipid) levels are recommended every five years. Colorectal Cancer screening tests help to find pre-cancerous polyps (growths in the colon) so they can be removed before they turn into cancer. Tests ordered for screening depend on your personal and family history risk factors. Screening for Breast Cancer is recommended yearly with a mammogram. 
 
Screening for Cervical Cancer is recommended every two years (annually for certain risk factors, such as previous history of STD or abnormal PAP in past 7 years), with a Pelvic Exam with PAP Here is a list of your current Health Maintenance items with a due date: 
Health Maintenance Topic Date Due  MEDICARE YEARLY EXAM  11/22/2017  
 HEMOGLOBIN A1C Q6M  02/23/2018  
 EYE EXAM RETINAL OR DILATED Q1  05/24/2018  
 FOOT EXAM Q1  08/23/2018  MICROALBUMIN Q1  08/23/2018  LIPID PANEL Q1  08/23/2018  GLAUCOMA SCREENING Q2Y  05/24/2019  
 DTaP/Tdap/Td series (2 - Td) 02/24/2027  
 OSTEOPOROSIS SCREENING (DEXA)  Completed  ZOSTER VACCINE AGE 60>  Addressed  Pneumococcal 65+ Low/Medium Risk  Completed  Influenza Age 5 to Adult  Completed Preventing Falls: Care Instructions Your Care Instructions Getting around your home safely can be a challenge if you have injuries or health problems that make it easy for you to fall.  Loose rugs and furniture in walkways are among the dangers for many older people who have problems walking or who have poor eyesight. People who have conditions such as arthritis, osteoporosis, or dementia also have to be careful not to fall. You can make your home safer with a few simple measures. Follow-up care is a key part of your treatment and safety. Be sure to make and go to all appointments, and call your doctor if you are having problems. It's also a good idea to know your test results and keep a list of the medicines you take. How can you care for yourself at home? Taking care of yourself · You may get dizzy if you do not drink enough water. To prevent dehydration, drink plenty of fluids, enough so that your urine is light yellow or clear like water. Choose water and other caffeine-free clear liquids. If you have kidney, heart, or liver disease and have to limit fluids, talk with your doctor before you increase the amount of fluids you drink. · Exercise regularly to improve your strength, muscle tone, and balance. Walk if you can. Swimming may be a good choice if you cannot walk easily. · Have your vision and hearing checked each year or any time you notice a change. If you have trouble seeing and hearing, you might not be able to avoid objects and could lose your balance. · Know the side effects of the medicines you take. Ask your doctor or pharmacist whether the medicines you take can affect your balance. Sleeping pills or sedatives can affect your balance. · Limit the amount of alcohol you drink. Alcohol can impair your balance and other senses. · Ask your doctor whether calluses or corns on your feet need to be removed. If you wear loose-fitting shoes because of calluses or corns, you can lose your balance and fall. · Talk to your doctor if you have numbness in your feet. Preventing falls at home · Remove raised doorway thresholds, throw rugs, and clutter. Repair loose carpet or raised areas in the floor. · Move furniture and electrical cords to keep them out of walking paths. · Use nonskid floor wax, and wipe up spills right away, especially on ceramic tile floors. · If you use a walker or cane, put rubber tips on it. If you use crutches, clean the bottoms of them regularly with an abrasive pad, such as steel wool. · Keep your house well lit, especially [place], and outside walkways. Use night-lights in areas such as hallways and bathrooms. Add extra light switches or use remote switches (such as switches that go on or off when you clap your hands) to make it easier to turn lights on if you have to get up during the night. · Install sturdy handrails on stairways. · Move items in your cabinets so that the things you use a lot are on the lower shelves (about waist level). · Keep a cordless phone and a flashlight with new batteries by your bed. If possible, put a phone in each of the main rooms of your house, or carry a cell phone in case you fall and cannot reach a phone. Or, you can wear a device around your neck or wrist. You push a button that sends a signal for help. · Wear low-heeled shoes that fit well and give your feet good support. Use footwear with nonskid soles. Check the heels and soles of your shoes for wear. Repair or replace worn heels or soles. · Do not wear socks without shoes on wood floors. · Walk on the grass when the sidewalks are slippery. If you live in an area that gets snow and ice in the winter, sprinkle salt on slippery steps and sidewalks. Preventing falls in the bath · Install grab bars and nonskid mats inside and outside your shower or tub and near the toilet and sinks. · Use shower chairs and bath benches. · Use a hand-held shower head that will allow you to sit while showering.  
· Get into a tub or shower by putting the weaker leg in first. Get out of a tub or shower with your strong side first. 
 · Repair loose toilet seats and consider installing a raised toilet seat to make getting on and off the toilet easier. · Keep your bathroom door unlocked while you are in the shower. Where can you learn more? Go to http://agnes-nirmal.info/. Enter [id number] [id number] in the search box to learn more about \"Preventing Falls: Care Instructions. \" Current as of: May 12, 2017 Content Version: 11.4 © 7651-1905 [employer] Online. Care instructions adapted under license by Protagen (which disclaims liability or warranty for this information). If you have questions about a medical condition or this instruction, always ask your healthcare professional. [address] any warranty or liability for your use of this information. Preventing Outdoor Falls: Care Instructions Your Care Instructions Worries about falls don't need to keep you indoors. Outdoor activities like walking have big benefits for your health. You will need to watch your step and learn a few safety measures. If you are worried about having a fall outdoors, ask your doctor about exercises, classes, or physical therapy that may help. You can learn ways to gain strength, flexibility, and balance. Ask if it might help to use a cane or walker. You can make your time outdoors safer with a few simple measures. Follow-up care is a key part of your treatment and safety. Be sure to make and go to all appointments, and call your doctor if you are having problems. It's also a good idea to know your test results and keep a list of the medicines you take. How can you prevent falls outdoors? · Wear shoes with firm soles and low heels. If you have to walk on an icy surface, use grippers that can be worn over your shoes in bad weather. · Be extra careful if weather is bad. Walk on the grass when the sidewalks are slick.  If you live in a place that gets snow and ice in the winter, sprinkle salt on slippery stairs and sidewalks. · Be careful getting on or off buses and trains or getting in and out of cars. If handrails are available, use them. · Be careful when you cross the street. Look for crosswalks or places where curb cuts or ramps are present. · Try not to hurry, especially if you are carrying something. · Be cautious in parking lots or garages. There may be curbs or changes in pavement, or the height of the pavement may vary. · Make sure to wear the correct eyeglasses, if you need them. Reading glasses or bifocals can make it harder to see hazards that might be in your way. · If you are walking outdoors for exercise, try to: 
¨ Walk in well-lighted, well-maintained areas. These include high school or college tracks, shopping malls, and public spaces. ¨ Walk with a partner. ¨ Watch out for cracked sidewalks, curbs, changes in the height of the pavement, exposed tree roots, and debris such as fallen leaves or branches. Where can you learn more? Go to http://agnesPlaynomicsnirmal.info/. Enter N573 in the search box to learn more about \"Preventing Outdoor Falls: Care Instructions. \" Current as of: May 12, 2017 Content Version: 11.4 © 0387-3246 [employer]. Care instructions adapted under license by [employer] Screen Tools (which disclaims liability or warranty for this information). If you have questions about a medical condition or this instruction, always ask your healthcare professional. [place] [address] any warranty or liability for your use of this information. How to Get Up Safely After a Fall: Care Instructions Your Care Instructions If you have injuries, health problems, or other reasons that may make it easy for you to fall at home, it is a good idea to learn how to get up safely after a fall. Learning how to get up correctly can help you avoid making an injury worse. Also, knowing what to do if you cannot get up can help you stay safe until help arrives. Follow-up care is a key part of your treatment and safety. Be sure to make and go to all appointments, and call your doctor if you are having problems. It's also a good idea to know your test results and keep a list of the medicines you take. How can you care for yourself after a fall? If you think you can get up First lie still for a few minutes and think about how you feel. If your body feels okay and you think you can get up safely, follow the rest of the steps below: 1. Look for a chair or other piece of furniture that is close to you. 2. Roll onto your side and rest. Roll by turning your head in the direction you want to roll, move your shoulder and arm, then hip and leg in the same direction. 3. Lie still for a moment to let your blood pressure adjust. 
4. Slowly push your upper body up, lift your head, and take a moment to rest. 
5. Slowly get up on your hands and knees, and crawl to the chair or other stable piece of furniture. 6. Put your hands on the chair. 7. Move one foot forward, and place it flat on the floor. Your other leg should be bent with the knee on the floor. 8. Rise slowly, turn your body, and sit in the chair. Stay seated for a bit and think about how you feel. Call for help. Even if you feel okay, let someone know what happened to you. You might not know that you have a serious injury. If you cannot get up 1. If you think you are injured after a fall or you cannot get up, try not to panic. 2. Call out for help. 3. If you have a phone within reach or you have an emergency call device, use it to call for help. 4. If you do not have a phone within reach, try to slide yourself toward it. If you cannot get to the phone, try to slide toward a door or window or a place where you think you can be heard. 5. [place] or use an object to make noise so someone might hear you. 6. If you can reach something that you can use for a pillow, place it under your head. Try to stay warm by covering yourself with a blanket or clothing while you wait for help. When should you call for help? Call 911 anytime you think you may need emergency care. For example, call if: 
? · You passed out (lost consciousness). ? · You cannot get up after a fall. ? · You have severe pain. ?Call your doctor now or seek immediate medical care if: 
? · You have new or worse pain. ? · You are dizzy or lightheaded. ? · You hit your head. ? Watch closely for changes in your health, and be sure to contact your doctor if: 
? · You do not get better as expected. Where can you learn more? Go to http://agnes-nirmal.info/. Enter X519 in the search box to learn more about \"How to Get Up Safely After a Fall: Care Instructions. \" Current as of: May 12, 2017 Content Version: 11.4 © 4368-6411 [employer]Advance. Care instructions adapted under license by Syniverse (which disclaims liability or warranty for this information). If you have questions about a medical condition or this instruction, always ask your healthcare professional. [address] any warranty or liability for your use of this information. Deciding About Life-Prolonging Treatment Deciding About Life-Prolonging Treatment What is life-prolonging treatment? There are many kinds of treatment that can help you live longer. These may be needed for only a short time until your illness improves. Or you may use them over the long term to help keep you alive. Some treatments include the use of: · Medicines to slow the progress of certain diseases, such as heart disease, diabetes, cancer, AIDS, or Alzheimer's disease. · Antibiotics to treat serious infections, such as pneumonia. · Dialysis to clean your blood if your kidneys stop working. · A breathing machine to help you breathe if you can't breathe on your own. This machine pumps air into your lungs through a tube put into your throat. · A feeding tube or an intravenous (IV) line to give you food and fluids if you can't eat or drink. · Cardiopulmonary resuscitation (CPR) to try to restart your heart. The decision to receive treatments that may help you live longer is a personal one. You may want your doctor to do everything possible to keep you alive, even when your chance for recovery is small. Or you may choose to only have care to manage your pain and other symptoms. What are key points about this decision? · If there is a good chance that your illness can be cured or managed, your doctor may advise you to first try available treatments. If these don't work, then you might think about stopping treatment. · If you stop treatment, you will still receive care that focuses on pain relief and comfort. · A decision to stop treatment that keeps you alive does not have to be permanent. You can always change your mind if your health starts to improve. · Even though treatment focuses on helping you live longer, it may cause side effects that can greatly affect your quality of life. And it could affect how you spend time with your family and friends. · If you still have personal goals that you want to pursue, you may want treatment that keeps you alive long enough to reach them. Why might you choose life-prolonging treatment? · There is a good chance that your illness can be cured or managed. · You think you can manage the possible side effects of treatment. · You don't think treatment will get in the way of your quality of life. · You have personal goals that you still want to pursue and achieve. Why might you choose to stop life-prolonging treatment? · Your chance of surviving your illness is very low. · You have tried all possible treatments for your illness, but they have not helped. · You can no longer deal with the side effects of treatment. · You have already met the goals you set out to achieve in your life. Your decision Thinking about the facts and your feelings can help you make a decision that is right for you. Be sure you understand the benefits and risks of your options, and think about what else you need to do before you make the decision. Where can you learn more? Go to http://agnes-nirmal.info/. Enter C217 in the search box to learn more about \"Deciding About Life-Prolonging Treatment. \" Current as of: September 24, 2016 Content Version: 11.4 © 4922-8311 TrackaPhone. Care instructions adapted under license by Speaktoit (which disclaims liability or warranty for this information). If you have questions about a medical condition or this instruction, always ask your healthcare professional. [address] any warranty or liability for your use of this information. [address] What is a living will? A living will is a legal form you use to write down the kind of care you want at the end of your life. It is used by the health professionals who will treat you if you aren't able to decide for yourself. If you put your wishes in writing, your loved ones and others will know what kind of care you want. They won't need to guess. This can ease your mind and be helpful to others. A living will is not the same as an estate or property will. An estate will explains what you want to happen with your money and property after you die. Is a living will a legal document? A living will is a legal document. Each state has its own laws about living valenzuela. If you move to another state, make sure that your living will is legal in the state where you now live. Or you might use a universal form that has been approved by many states.  This kind of form can sometimes be completed and stored online. Your electronic copy will then be available wherever you have a connection to the Internet. In most cases, doctors will respect your wishes even if you have a form from a different state. · You don't need an  to complete a living will. But legal advice can be helpful if your state's laws are unclear, your health history is complicated, or your family can't agree on what should be in your living will. · You can change your living will at any time. Some people find that their wishes about end-of-life care change as their health changes. · In addition to making a living will, think about completing a medical power of  form. This form lets you name the person you want to make end-of-life treatment decisions for you (your \"health care agent\") if you're not able to. Many hospitals and nursing homes will give you the forms you need to complete a living will and a medical power of . · Your living will is used only if you can't make or communicate decisions for yourself anymore. If you become able to make decisions again, you can accept or refuse any treatment, no matter what you wrote in your living will. · Your state may offer an online registry. This is a place where you can store your living will online so the doctors and nurses who need to treat you can find it right away. What should you think about when creating a living will? Talk about your end-of-life wishes with your family members and your doctor. Let them know what you want. That way the people making decisions for you won't be surprised by your choices. Think about these questions as you make your living will: · Do you know enough about life support methods that might be used? If not, talk to your doctor so you know what might be done if you can't breathe on your own, your heart stops, or you're unable to swallow.  
· What things would you still want to be able to do after you receive life-support methods? Would you want to be able to walk? To speak? To eat on your own? To live without the help of machines? · If you have a choice, where do you want to be cared for? In your home? At a hospital or nursing home? · Do you want certain Cheondoism practices performed if you become very ill? · If you have a choice at the end of your life, where would you prefer to die? At home? In a hospital or nursing home? Somewhere else? · Would you prefer to be buried or cremated? · Do you want your organs to be donated after you die? What should you do with your living will? · Make sure that your family members and your health care agent have copies of your living will. · Give your doctor a copy of your living will to keep in your medical record. If you have more than one doctor, make sure that each one has a copy. · You may want to put a copy of your living will where it can be easily found. Where can you learn more? Go to http://agnes-nirmal.info/. Enter D863 in the search box to learn more about \"Learning About Living [place]. \" Current as of: September 24, 2016 Content Version: 11.4 © 2276-8090 Ask The Doctor. Care instructions adapted under license by SnapDash (which disclaims liability or warranty for this information). If you have questions about a medical condition or this instruction, always ask your healthcare professional. [place] [address] any warranty or liability for your use of this information. Advance Directives: Care Instructions Your Care Instructions An advance directive is a legal way to state your wishes at the end of your life. It tells your family and your doctor what to do if you can no longer say what you want. There are two main types of advance directives. You can change them any time that your wishes change.  
· A living will tells your family and your doctor your wishes about life support and other treatment. · A durable power of  for health care lets you name a person to make treatment decisions for you when you can't speak for yourself. This person is called a health care agent. If you do not have an advance directive, decisions about your medical care may be made by a doctor or a  who doesn't know you. It may help to think of an advance directive as a gift to the people who care for you. If you have one, they won't have to make tough decisions by themselves. Follow-up care is a key part of your treatment and safety. Be sure to make and go to all appointments, and call your doctor if you are having problems. It's also a good idea to know your test results and keep a list of the medicines you take. How can you care for yourself at home? · Discuss your wishes with your loved ones and your doctor. This way, there are no surprises. · Many states have a unique form. Or you might use a universal form that has been approved by many states. This kind of form can sometimes be completed and stored online. Your electronic copy will then be available wherever you have a connection to the Internet. In most cases, doctors will respect your wishes even if you have a form from a different state. · You don't need a  to do an advance directive. But you may want to get legal advice. · Think about these questions when you prepare an advance directive: ¨ Who do you want to make decisions about your medical care if you are not able to? Many people choose a family member or close friend. ¨ Do you know enough about life support methods that might be used? If not, talk to your doctor so you understand. ¨ What are you most afraid of that might happen? You might be afraid of having pain, losing your independence, or being kept alive by machines. ¨ Where would you prefer to die? Choices include your home, a hospital, or a nursing home. ¨ Would you like to have information about hospice care to support you and your family? ¨ Do you want to donate organs when you die? ¨ Do you want certain Confucianist practices performed before you die? If so, put your wishes in the advance directive. · Read your advance directive every year, and make changes as needed. When should you call for help? Be sure to contact your doctor if you have any questions. Where can you learn more? Go to http://agnes-nirmal.info/. Enter R264 in the search box to learn more about \"Advance Directives: Care Instructions. \" Current as of: September 24, 2016 Content Version: 11.4 © 5915-0086 [employer]. Care instructions adapted under license by [employer] (which disclaims liability or warranty for this information). If you have questions about a medical condition or this instruction, always ask your healthcare professional. [address] any warranty or liability for your use of this information. Introducing [facility]! [facility] introduces [employer] patient portal. Now you can access parts of your medical record, email your doctor's office, and request medication refills online. 1. In your internet browser, go to [email or web address] 2. Click on the First Time User? Click Here link in the Sign In box. You will see the New Member Sign Up page. 3. Enter your [employer] Access Code exactly as it appears below. You will not need to use this code after youve completed the sign-up process. If you do not sign up before the expiration date, you must request a new code. · [employer] Access Code: [email or web address] Expires: 2/27/2018 11:55 AM 
 
4. Enter the last four digits of your Social Security Number (xxxx) and Date of Birth (mm/dd/yyyy) as indicated and click Submit. You will be taken to the next sign-up page. 5. Create a Wiztango ID. This will be your Wiztango login ID and cannot be changed, so think of one that is secure and easy to remember. 6. Create a Wiztango password. You can change your password at any time. 7. Enter your Password Reset Question and Answer. This can be used at a later time if you forget your password. 8. Enter your e-mail address. You will receive e-mail notification when new information is available in [address] 9. Click Sign Up. You can now view and download portions of your medical record. 10. Click the Download Summary menu link to download a portable copy of your medical information. If you have questions, please visit the Frequently Asked Questions section of the Wiz[employer] website. Remember, [employer] is NOT to be used for urgent needs. For medical emergencies, dial 911. Now available from your iPhone and Android! Please provide this summary of care documentation to your next provider. Your primary care clinician is listed as [address] [place] [address]. If you have any questions after today's visit, please call [phone].

## 2017-11-29 NOTE — PROGRESS NOTES
Arsenio Vazquez is a 80 y.o.  female presents today for office visit for routine nonfasting follow up. Pt is in Room # 6. This patient is accompanied in the office by her daughter. 1. Have you been to the ER, urgent care clinic since your last visit? Hospitalized since your last visit? No    2. Have you seen or consulted any other health care providers outside of the [address] [place] [address] since your last visit? Include any pap smears or colon screening. Yes [facility] 9/23/17    Health Maintenance reviewed.       Upcoming Appts  9/2018 - Cardiology  TBA - Podiatry  TBA - Oncology

## 2017-11-29 NOTE — PROGRESS NOTES
SUBJECTIVE:  Isabell Cooper is a 80y.o. year old female   Chief Complaint   Patient presents with    Hypertension    Diabetes    Cholesterol Problem    Gout    Annual Wellness Visit       History of Present Illness:     Her amlodipine was increased to 10 mg daily. She does not c/o any hypotensive symptoms. Her ASHD is controlled. No angina. She went to the ER on 7/12/17 for dizziness. She was sent home with Antivert for BPV after stable w/u. Since then she has not had any episodes of dizziness. She is following ADA diet for DM II. She is not checking her glucose. She has difficulty walking without assistance. She was ordered a cane; but not using. She is on simvastatin for lipids w/o problem. She has elevated uric acid and has history of gout. No Systemic, Cardiovascular or Respiratory symptoms. Past Medical History:   Diagnosis Date    Arteriosclerotic heart disease (ASHD)     CKD (chronic kidney disease), stage III 6/23/2015    CVA (cerebral infarction)     Diabetes ([facility] [address]     Gout 9/23/13    Left Foot    Hypertension     Leg weakness, bilateral 3/18/13     History reviewed. No pertinent surgical history. Current Outpatient Prescriptions   Medication Sig    clopidogrel (PLAVIX) 75 mg tab Take 1 Tab by mouth daily.  Cane nely Quad cane for gait difficulty    amLODIPine (NORVASC) 5 mg tablet Take 1.5 Tabs by mouth daily. (Patient taking differently: Take 10 mg by mouth daily.)    metoprolol tartrate (LOPRESSOR) 50 mg tablet take 1/2 tablet by mouth twice a day    simvastatin (ZOCOR) 5 mg tablet take 1 tablet by mouth at bedtime    Blood-Glucose Meter monitoring kit Check glucose once daily. Re: DM .00    glucose blood VI test strips (BLOOD GLUCOSE TEST) strip Check glucose once daily. Re: DM .00    Lancets misc Check glucose once daily. Re: DM .00    aspirin delayed-release 81 mg tablet Take 81 mg by mouth daily.     meclizine (ANTIVERT) 25 mg tablet Take 25 mg by mouth three (3) times daily. No current facility-administered medications for this visit. Allergies   Allergen Reactions    Valsartan Anaphylaxis          Review of Systems:   Constitutional: No fever, chills, night sweats, malaise, dizziness. Cardiovascular: No angina, palpitations, PND, orthopnea, lightheadedness, edema, claudication. Respiratory: No dyspnea, wheeze, pleurisy, hemoptysis, unusual cough or sputum. Gastrointestinal: no nausea/ vomiting, bowel habit change, pain, LI symptoms, melena, hematochezia, anorexia. Psychiatric: No agitation, confusion/disorientation, suicidal or homicidal ideation. OBJECTIVE:  Physical Exam:   Constitutional: General Appearance:  well developed, well nourished, nontoxic, in no acute distress. Visit Vitals    /62 (BP 1 Location: Right arm, BP Patient Position: Sitting)    Pulse 70    Temp 97.7 °F (36.5 °C) (Oral)    Resp 15    Ht 5' 1\" (1.549 m)    Wt 148 lb (67.1 kg)    SpO2 99%    BMI 27.96 kg/m2     Pulmonary: Respiratory effort: normal; no dyspnea, no retractions, no accessory muscle use. Auscultation: normal & symmetrical air exchange; no rales, no rhonchi, no wheeze; no rubs    Cardiovascular[de-identified] Palpation of Heart: PMI not displaced or enlarged, no thrills, no heaves. Auscultation of Heart: RRR; G I/VI systolic murmur, no rubs, no gallops. Neck: carotid arteries- normal volume & without bruit; no JVD. Extremities: no edema, no active varicosity. GI[de-identified] Normal bowel sounds. No masses; no tenderness; no rebound/rigidity; no CVA tenderness. No hepatosplenomegaly. Psychiatric: Oriented to time, place and person. Musculoskeletal: NC/AT. Neck-supple. Walking is slow.       Lab Results   Component Value Date/Time    WBC 7.4 08/23/2017 10:41 AM    HGB 13.2 08/23/2017 10:41 AM    HCT 40.9 08/23/2017 10:41 AM    PLATELET 101 00/92/9186 10:41 AM    MCV 78.1 08/23/2017 10:41 AM     Lab Results   Component Value Date/Time    Sodium 139 08/23/2017 10:41 AM    Potassium 4.1 08/23/2017 10:41 AM    Chloride 105 08/23/2017 10:41 AM    CO2 26 08/23/2017 10:41 AM    Anion gap 8 08/23/2017 10:41 AM    Glucose 103 08/23/2017 10:41 AM    BUN 23 08/23/2017 10:41 AM    Creatinine 1.14 08/23/2017 10:41 AM    BUN/Creatinine ratio 20 08/23/2017 10:41 AM    GFR est AA 55 08/23/2017 10:41 AM    GFR est non-AA 45 08/23/2017 10:41 AM    Calcium 8.7 08/23/2017 10:41 AM    Bilirubin, total 0.6 08/23/2017 10:41 AM    AST (SGOT) 15 08/23/2017 10:41 AM    Alk. phosphatase 68 08/23/2017 10:41 AM    Protein, total 7.9 08/23/2017 10:41 AM    Albumin 3.6 08/23/2017 10:41 AM    Globulin 4.3 08/23/2017 10:41 AM    A-G Ratio 0.8 08/23/2017 10:41 AM    ALT (SGPT) 17 08/23/2017 10:41 AM     Lab Results   Component Value Date/Time    TSH 1.79 08/23/2017 10:41 AM     Lab Results   Component Value Date/Time    Cholesterol, total 124 08/23/2017 10:41 AM    HDL Cholesterol 68 08/23/2017 10:41 AM    LDL, calculated 35 08/23/2017 10:41 AM    VLDL, calculated 21 08/23/2017 10:41 AM    Triglyceride 105 08/23/2017 10:41 AM    CHOL/HDL Ratio 1.8 08/23/2017 10:41 AM     Lab Results   Component Value Date/Time    Hemoglobin A1c 6.8 08/23/2017 02:01 PM    Hemoglobin A1c (POC) 6.3 02/20/2017 10:23 AM        ASSESSMENT:     1. Type 2 diabetes mellitus with complication, without long-term current use of insulin ([address]    2. Essential hypertension    3. CKD (chronic kidney disease), stage III    4. ASHD (arteriosclerotic heart disease)    5. Negative depression screening    6. Medicare annual wellness visit, subsequent        PLAN:     Pharmacologic Management: Medications reviewed with the patient. No change. Orders Placed This Encounter   [address]     Discussed DDx, follow-up & work-up. Discussed risk/benefit & side effect of treatment. Follow up visit as scheduled in 3 months, prn sooner. PRN to ER.   Low salt ADA diet & graduated excecise. Recommend home glucose monitoring. Call with BP reading PRN. Follow up with cardiology. Health risk from non adherence discussed. Patient/ daughter voiced understanding.      Angelo Jennings MD

## 2017-11-29 NOTE — PATIENT INSTRUCTIONS
Schedule of Personalized Health Plan for [place]  (Provide Copy to Patient)   11/29/17      The best way to stay healthy is to live a healthy lifestyle. A healthy lifestyle includes regular exercise, eating a well-balanced diet, keeping a healthy weight and not smoking. Regular physical exams and screening tests are another important way to take care of yourself. Preventive exams provided by health care providers can find health problems early when treatment works best and can keep you from getting certain diseases or illnesses. Preventive services include exams, lab tests, screenings, shots, monitoring and information to help you take care of your own health. All people over 65 should have a pneumonia shot. Pneumonia shots are usually only needed once in a lifetime unless your doctor decides differently. All people over 65 should have a yearly flu shot. People over 65 are at medium to high risk for Hepatitis B. Three shots are needed for complete protection. In addition to your physical exam, some screening tests are recommended:    Bone mass measurement (dexa scan) is recommended every two years  Diabetes Mellitus screening is recommended every year. Glaucoma is an eye disease caused by high pressure in the eye. An eye exam is recommended every year. Cardiovascular screening tests that check your cholesterol and other blood fat (lipid) levels are recommended every five years. Colorectal Cancer screening tests help to find pre-cancerous polyps (growths in the colon) so they can be removed before they turn into cancer. Tests ordered for screening depend on your personal and family history risk factors.     Screening for Breast Cancer is recommended yearly with a mammogram.    Screening for Cervical Cancer is recommended every two years (annually for certain risk factors, such as previous history of STD or abnormal PAP in past 7 years), with a Pelvic Exam with PAP    Here is a list of your current Health Maintenance items with a due date:  Health Maintenance   Topic Date Due    MEDICARE YEARLY EXAM  11/22/2017    HEMOGLOBIN A1C Q6M  02/23/2018    EYE EXAM RETINAL OR DILATED Q1  05/24/2018    FOOT EXAM Q1  08/23/2018    MICROALBUMIN Q1  08/23/2018    LIPID PANEL Q1  08/23/2018    GLAUCOMA SCREENING Q2Y  05/24/2019    DTaP/Tdap/Td series (2 - Td) 02/24/2027    OSTEOPOROSIS SCREENING (DEXA)  Completed    ZOSTER VACCINE AGE 60>  Addressed    Pneumococcal 65+ Low/Medium Risk  Completed    Influenza Age 5 to Adult  Completed              Preventing Falls: Care Instructions  Your Care Instructions    Getting around your home safely can be a challenge if you have injuries or health problems that make it easy for you to fall. Loose rugs and furniture in walkways are among the dangers for many older people who have problems walking or who have poor eyesight. People who have conditions such as arthritis, osteoporosis, or dementia also have to be careful not to fall. You can make your home safer with a few simple measures. Follow-up care is a key part of your treatment and safety. Be sure to make and go to all appointments, and call your doctor if you are having problems. It's also a good idea to know your test results and keep a list of the medicines you take. How can you care for yourself at home? Taking care of yourself  · You may get dizzy if you do not drink enough water. To prevent dehydration, drink plenty of fluids, enough so that your urine is light yellow or clear like water. Choose water and other caffeine-free clear liquids. If you have kidney, heart, or liver disease and have to limit fluids, talk with your doctor before you increase the amount of fluids you drink. · Exercise regularly to improve your strength, muscle tone, and balance. Walk if you can. Swimming may be a good choice if you cannot walk easily.   · Have your vision and hearing checked each year or any time you notice a change. If you have trouble seeing and hearing, you might not be able to avoid objects and could lose your balance. · Know the side effects of the medicines you take. Ask your doctor or pharmacist whether the medicines you take can affect your balance. Sleeping pills or sedatives can affect your balance. · Limit the amount of alcohol you drink. Alcohol can impair your balance and other senses. · Ask your doctor whether calluses or corns on your feet need to be removed. If you wear loose-fitting shoes because of calluses or corns, you can lose your balance and fall. · Talk to your doctor if you have numbness in your feet. Preventing falls at home  · Remove raised doorway thresholds, throw rugs, and clutter. Repair loose carpet or raised areas in the floor. · Move furniture and electrical cords to keep them out of walking paths. · Use nonskid floor wax, and wipe up spills right away, especially on ceramic tile floors. · If you use a walker or cane, put rubber tips on it. If you use crutches, clean the bottoms of them regularly with an abrasive pad, such as steel wool. · Keep your house well lit, especially [place], and outside walkways. Use night-lights in areas such as hallways and bathrooms. Add extra light switches or use remote switches (such as switches that go on or off when you clap your hands) to make it easier to turn lights on if you have to get up during the night. · Install sturdy handrails on stairways. · Move items in your cabinets so that the things you use a lot are on the lower shelves (about waist level). · Keep a cordless phone and a flashlight with new batteries by your bed. If possible, put a phone in each of the main rooms of your house, or carry a cell phone in case you fall and cannot reach a phone. Or, you can wear a device around your neck or wrist. You push a button that sends a signal for help.   · Wear low-heeled shoes that fit well and give your feet good support. Use footwear with nonskid soles. Check the heels and soles of your shoes for wear. Repair or replace worn heels or soles. · Do not wear socks without shoes on wood floors. · Walk on the grass when the sidewalks are slippery. If you live in an area that gets snow and ice in the winter, sprinkle salt on slippery steps and sidewalks. Preventing falls in the bath  · Install grab bars and nonskid mats inside and outside your shower or tub and near the toilet and sinks. · Use shower chairs and bath benches. · Use a hand-held shower head that will allow you to sit while showering. · Get into a tub or shower by putting the weaker leg in first. Get out of a tub or shower with your strong side first.  · Repair loose toilet seats and consider installing a raised toilet seat to make getting on and off the toilet easier. · Keep your bathroom door unlocked while you are in the shower. Where can you learn more? Go to http://agnesWhatâ€™s On Foodienirmal.info/. Enter 0476 [id number] in the search box to learn more about \"Preventing Falls: Care Instructions. \"  Current as of: May 12, 2017  Content Version: 11.4  © 0226-1580 [employer]. Care instructions adapted under license by [employer] (which disclaims liability or warranty for this information). If you have questions about a medical condition or this instruction, always ask your healthcare professional. [place] [address] any warranty or liability for your use of this information. Preventing Outdoor Falls: Care Instructions  Your Care Instructions    Worries about falls don't need to keep you indoors. Outdoor activities like walking have big benefits for your health. You will need to watch your step and learn a few safety measures. If you are worried about having a fall outdoors, ask your doctor about exercises, classes, or physical therapy that may help. You can learn ways to gain strength, flexibility, and balance.  Ask if it might help to use a cane or walker. You can make your time outdoors safer with a few simple measures. Follow-up care is a key part of your treatment and safety. Be sure to make and go to all appointments, and call your doctor if you are having problems. It's also a good idea to know your test results and keep a list of the medicines you take. How can you prevent falls outdoors? · Wear shoes with firm soles and low heels. If you have to walk on an icy surface, use grippers that can be worn over your shoes in bad weather. · Be extra careful if weather is bad. Walk on the grass when the sidewalks are slick. If you live in a place that gets snow and ice in the winter, sprinkle salt on slippery stairs and sidewalks. · Be careful getting on or off buses and trains or getting in and out of cars. If handrails are available, use them. · Be careful when you cross the street. Look for crosswalks or places where curb cuts or ramps are present. · Try not to hurry, especially if you are carrying something. · Be cautious in parking lots or garages. There may be curbs or changes in pavement, or the height of the pavement may vary. · Make sure to wear the correct eyeglasses, if you need them. Reading glasses or bifocals can make it harder to see hazards that might be in your way. · If you are walking outdoors for exercise, try to:  ¨ Walk in well-lighted, well-maintained areas. These include high school or college tracks, shopping malls, and public spaces. ¨ Walk with a partner. ¨ Watch out for cracked sidewalks, curbs, changes in the height of the pavement, exposed tree roots, and debris such as fallen leaves or branches. Where can you learn more? Go to http://agnes-nirmal.info/. Enter B359 in the search box to learn more about \"Preventing Outdoor Falls: Care Instructions. \"  Current as of: May 12, 2017  Content Version: 11.4  © 2853-4986 [employer]  Care instructions adapted under license by [employer] (which disclaims liability or warranty for this information). If you have questions about a medical condition or this instruction, always ask your healthcare professional. [address] any warranty or liability for your use of this information. How to Get Up Safely After a Fall: Care Instructions  Your Care Instructions    If you have injuries, health problems, or other reasons that may make it easy for you to fall at home, it is a good idea to learn how to get up safely after a fall. Learning how to get up correctly can help you avoid making an injury worse. Also, knowing what to do if you cannot get up can help you stay safe until help arrives. Follow-up care is a key part of your treatment and safety. Be sure to make and go to all appointments, and call your doctor if you are having problems. It's also a good idea to know your test results and keep a list of the medicines you take. How can you care for yourself after a fall? If you think you can get up  First lie still for a few minutes and think about how you feel. If your body feels okay and you think you can get up safely, follow the rest of the steps below:  1. Look for a chair or other piece of furniture that is close to you. 2. Roll onto your side and rest. Roll by turning your head in the direction you want to roll, move your shoulder and arm, then hip and leg in the same direction. 3. Lie still for a moment to let your blood pressure adjust.  4. Slowly push your upper body up, lift your head, and take a moment to rest.  5. Slowly get up on your hands and knees, and crawl to the chair or other stable piece of furniture. 6. Put your hands on the chair. 7. Move one foot forward, and place it flat on the floor. Your other leg should be bent with the knee on the floor. 8. Rise slowly, turn your body, and sit in the chair. Stay seated for a bit and think about how you feel.  Call for help. Even if you feel okay, let someone know what happened to you. You might not know that you have a serious injury. If you cannot get up  1. If you think you are injured after a fall or you cannot get up, try not to panic. 2. Call out for help. 3. If you have a phone within reach or you have an emergency call device, use it to call for help. 4. If you do not have a phone within reach, try to slide yourself toward it. If you cannot get to the phone, try to slide toward a door or window or a place where you think you can be heard. 5. Divide or use an object to make noise so someone might hear you. 6. If you can reach something that you can use for a pillow, place it under your head. Try to stay warm by covering yourself with a blanket or clothing while you wait for help. When should you call for help? Call 911 anytime you think you may need emergency care. For example, call if:  ? · You passed out (lost consciousness). ? · You cannot get up after a fall. ? · You have severe pain. ?Call your doctor now or seek immediate medical care if:  ? · You have new or worse pain. ? · You are dizzy or lightheaded. ? · You hit your head. ? Watch closely for changes in your health, and be sure to contact your doctor if:  ? · You do not get better as expected. Where can you learn more? Go to http://agnes-nirmal.info/. Enter L313 in the search box to learn more about \"How to Get Up Safely After a Fall: Care Instructions. \"  Current as of: May 12, 2017  Content Version: 11.4  © 1238-4822 [employer]. Care instructions adapted under license by [employer] (which disclaims liability or warranty for this information). If you have questions about a medical condition or this instruction, always ask your healthcare professional. [address] any warranty or liability for your use of this information.        Deciding About Life-Prolonging Treatment  Deciding About Life-Prolonging Treatment  What is life-prolonging treatment? There are many kinds of treatment that can help you live longer. These may be needed for only a short time until your illness improves. Or you may use them over the long term to help keep you alive. Some treatments include the use of:  · Medicines to slow the progress of certain diseases, such as heart disease, diabetes, cancer, AIDS, or Alzheimer's disease. · Antibiotics to treat serious infections, such as pneumonia. · Dialysis to clean your blood if your kidneys stop working. · A breathing machine to help you breathe if you can't breathe on your own. This machine pumps air into your lungs through a tube put into your throat. · A feeding tube or an intravenous (IV) line to give you food and fluids if you can't eat or drink. · Cardiopulmonary resuscitation (CPR) to try to restart your heart. The decision to receive treatments that may help you live longer is a personal one. You may want your doctor to do everything possible to keep you alive, even when your chance for recovery is small. Or you may choose to only have care to manage your pain and other symptoms. What are key points about this decision? · If there is a good chance that your illness can be cured or managed, your doctor may advise you to first try available treatments. If these don't work, then you might think about stopping treatment. · If you stop treatment, you will still receive care that focuses on pain relief and comfort. · A decision to stop treatment that keeps you alive does not have to be permanent. You can always change your mind if your health starts to improve. · Even though treatment focuses on helping you live longer, it may cause side effects that can greatly affect your quality of life. And it could affect how you spend time with your family and friends.   · If you still have personal goals that you want to pursue, you may want treatment that keeps you alive long enough to reach them. Why might you choose life-prolonging treatment? · There is a good chance that your illness can be cured or managed. · You think you can manage the possible side effects of treatment. · You don't think treatment will get in the way of your quality of life. · You have personal goals that you still want to pursue and achieve. Why might you choose to stop life-prolonging treatment? · Your chance of surviving your illness is very low. · You have tried all possible treatments for your illness, but they have not helped. · You can no longer deal with the side effects of treatment. · You have already met the goals you set out to achieve in your life. Your decision  Thinking about the facts and your feelings can help you make a decision that is right for you. Be sure you understand the benefits and risks of your options, and think about what else you need to do before you make the decision. Where can you learn more? Go to http://agnes-nirmal.info/. Enter F909 in the search box to learn more about \"Deciding About Life-Prolonging Treatment. \"  Current as of: September 24, 2016  Content Version: 11.4  © 8195-6825 [employer]. Care instructions adapted under license by [employer] (which disclaims liability or warranty for this information). If you have questions about a medical condition or this instruction, always ask your healthcare professional. [address] any warranty or liability for your use of this information. Learning About Living [place]  What is a living will? A living will is a legal form you use to write down the kind of care you want at the end of your life. It is used by the health professionals who will treat you if you aren't able to decide for yourself. If you put your wishes in writing, your loved ones and others will know what kind of care you want. They won't need to guess. This can ease your mind and be helpful to others. A living will is not the same as an estate or property will. An estate will explains what you want to happen with your money and property after you die. Is a living will a legal document? A living will is a legal document. Each state has its own laws about living val[place]a. If you move to another state, make sure that your living will is legal in the state where you now live. Or you might use a universal form that has been approved by many states. This kind of form can sometimes be completed and stored online. Your electronic copy will then be available wherever you have a connection to the Internet. In most cases, doctors will respect your wishes even if you have a form from a different state. · You don't need an  to complete a living will. But legal advice can be helpful if your state's laws are unclear, your health history is complicated, or your family can't agree on what should be in your living will. · You can change your living will at any time. Some people find that their wishes about end-of-life care change as their health changes. · In addition to making a living will, think about completing a medical power of  form. This form lets you name the person you want to make end-of-life treatment decisions for you (your \"health care agent\") if you're not able to. Many hospitals and nursing homes will give you the forms you need to complete a living will and a medical power of . · Your living will is used only if you can't make or communicate decisions for yourself anymore. If you become able to make decisions again, you can accept or refuse any treatment, no matter what you wrote in your living will. · Your state may offer an online registry. This is a place where you can store your living will online so the doctors and nurses who need to treat you can find it right away. What should you think about when creating a living will?   Talk about your end-of-life wishes with your family members and your doctor. Let them know what you want. That way the people making decisions for you won't be surprised by your choices. Think about these questions as you make your living will:  · Do you know enough about life support methods that might be used? If not, talk to your doctor so you know what might be done if you can't breathe on your own, your heart stops, or you're unable to swallow. · What things would you still want to be able to do after you receive life-support methods? Would you want to be able to walk? To speak? To eat on your own? To live without the help of machines? · If you have a choice, where do you want to be cared for? In your home? At a hospital or nursing home? · Do you want certain Caodaism practices performed if you become very ill? · If you have a choice at the end of your life, where would you prefer to die? At home? In a hospital or nursing home? Somewhere else? · Would you prefer to be buried or cremated? · Do you want your organs to be donated after you die? What should you do with your living will? · Make sure that your family members and your health care agent have copies of your living will. · Give your doctor a copy of your living will to keep in your medical record. If you have more than one doctor, make sure that each one has a copy. · You may want to put a copy of your living will where it can be easily found. Where can you learn more? Go to http://agnes-nirmal.info/. Enter U407 in the search box to learn more about \"Learning About Living [place]. \"  Current as of: September 24, 2016  Content Version: 11.4  © 5868-7333 [facility]. Care instructions adapted under license by [employer] (which disclaims liability or warranty for this information).  If you have questions about a medical condition or this instruction, always ask your healthcare professional. [employer] disclaims any warranty or liability for your use of this information. Advance Directives: Care Instructions  Your Care Instructions  An advance directive is a legal way to state your wishes at the end of your life. It tells your family and your doctor what to do if you can no longer say what you want. There are two main types of advance directives. You can change them any time that your wishes change. · A living will tells your family and your doctor your wishes about life support and other treatment. · A durable power of  for health care lets you name a person to make treatment decisions for you when you can't speak for yourself. This person is called a health care agent. If you do not have an advance directive, decisions about your medical care may be made by a doctor or a  who doesn't know you. It may help to think of an advance directive as a gift to the people who care for you. If you have one, they won't have to make tough decisions by themselves. Follow-up care is a key part of your treatment and safety. Be sure to make and go to all appointments, and call your doctor if you are having problems. It's also a good idea to know your test results and keep a list of the medicines you take. How can you care for yourself at home? · Discuss your wishes with your loved ones and your doctor. This way, there are no surprises. · Many states have a unique form. Or you might use a universal form that has been approved by many states. This kind of form can sometimes be completed and stored online. Your electronic copy will then be available wherever you have a connection to the Internet. In most cases, doctors will respect your wishes even if you have a form from a different state. · You don't need a  to do an advance directive. But you may want to get legal advice.   · Think about these questions when you prepare an advance directive:  ¨ Who do you want to make decisions about your medical care if you are not able to? Many people choose a family member or close friend. ¨ Do you know enough about life support methods that might be used? If not, talk to your doctor so you understand. ¨ What are you most afraid of that might happen? You might be afraid of having pain, losing your independence, or being kept alive by machines. ¨ Where would you prefer to die? Choices include your home, a hospital, or a nursing home. ¨ Would you like to have information about hospice care to support you and your family? ¨ Do you want to donate organs when you die? ¨ Do you want certain Zoroastrianism practices performed before you die? If so, put your wishes in the advance directive. · Read your advance directive every year, and make changes as needed. When should you call for help? Be sure to contact your doctor if you have any questions. Where can you learn more? Go to http://agnes-nirmal.info/. Enter R264 in the search box to learn more about \"Advance Directives: Care Instructions. \"  Current as of: September 24, 2016  Content Version: 11.4  © 6250-1424 [employer]. Care instructions adapted under license by [employer] (which disclaims liability or warranty for this information). If you have questions about a medical condition or this instruction, always ask your healthcare professional. [address] any warranty or liability for your use of this information.

## 2017-12-20 RX ORDER — AMLODIPINE BESYLATE 5 MG/1
10 TABLET ORAL DAILY
Qty: 180 TAB | Refills: 0 | Status: CANCELLED | OUTPATIENT
Start: 2017-12-20

## 2018-01-03 ENCOUNTER — DOCUMENTATION ONLY (OUTPATIENT)
Dept: FAMILY MEDICINE CLINIC | Age: 83
End: 2018-01-03

## 2018-01-03 NOTE — PROGRESS NOTES
Pt's daughter is asking what her mother can take for cough and congestion. Per [place] pt can take musinex, but not the DM. Pt needs to check BP and if it BP goes up she needs to call us and stop taking musinex.

## 2018-02-27 RX ORDER — SIMVASTATIN 5 MG/1
TABLET, FILM COATED ORAL
Qty: 90 TAB | Refills: 0 | Status: SHIPPED | OUTPATIENT
Start: 2018-02-27 | End: 2018-05-20 | Stop reason: SDUPTHER

## 2018-02-28 ENCOUNTER — OFFICE VISIT (OUTPATIENT)
Dept: FAMILY MEDICINE CLINIC | Age: 83
End: 2018-02-28

## 2018-02-28 ENCOUNTER — HOSPITAL ENCOUNTER (OUTPATIENT)
Dept: LAB | Age: 83
Discharge: HOME | End: 2018-02-28
Payer: MEDICARE

## 2018-02-28 VITALS
DIASTOLIC BLOOD PRESSURE: 68 MMHG | HEIGHT: 61 IN | HEART RATE: 62 BPM | SYSTOLIC BLOOD PRESSURE: 146 MMHG | WEIGHT: 145.4 LBS | TEMPERATURE: 96.7 F | OXYGEN SATURATION: 99 % | RESPIRATION RATE: 18 BRPM | BODY MASS INDEX: 27.45 KG/M2

## 2018-02-28 DIAGNOSIS — E11.21 TYPE 2 DIABETES WITH NEPHROPATHY (HCC): Primary | ICD-10-CM

## 2018-02-28 DIAGNOSIS — E66.3 OVERWEIGHT: ICD-10-CM

## 2018-02-28 DIAGNOSIS — E11.21 TYPE 2 DIABETES WITH NEPHROPATHY (HCC): ICD-10-CM

## 2018-02-28 DIAGNOSIS — I10 ESSENTIAL HYPERTENSION: ICD-10-CM

## 2018-02-28 DIAGNOSIS — N18.30 CKD (CHRONIC KIDNEY DISEASE), STAGE III (HCC): ICD-10-CM

## 2018-02-28 LAB
25(OH)D3 SERPL-MCNC: 16.6 NG/ML (ref 30–100)
ALBUMIN SERPL-MCNC: 3.4 G/DL (ref 3.4–5)
ANION GAP SERPL CALC-SCNC: 11 MMOL/L (ref 3–18)
BUN SERPL-MCNC: 25 MG/DL (ref 7–18)
BUN/CREAT SERPL: 20 (ref 12–20)
CALCIUM SERPL-MCNC: 8.9 MG/DL (ref 8.5–10.1)
CHLORIDE SERPL-SCNC: 106 MMOL/L (ref 100–108)
CO2 SERPL-SCNC: 25 MMOL/L (ref 21–32)
CREAT SERPL-MCNC: 1.26 MG/DL (ref 0.6–1.3)
EST. AVERAGE GLUCOSE BLD GHB EST-MCNC: 171 MG/DL
GLUCOSE SERPL-MCNC: 236 MG/DL (ref 74–99)
HBA1C MFR BLD: 7.6 % (ref 4.2–5.6)
PHOSPHATE SERPL-MCNC: 3.3 MG/DL (ref 2.5–4.9)
POTASSIUM SERPL-SCNC: 3.8 MMOL/L (ref 3.5–5.5)
SODIUM SERPL-SCNC: 142 MMOL/L (ref 136–145)

## 2018-02-28 PROCEDURE — 82306 VITAMIN D 25 HYDROXY: CPT | Performed by: FAMILY MEDICINE

## 2018-02-28 PROCEDURE — 36415 COLL VENOUS BLD VENIPUNCTURE: CPT | Performed by: FAMILY MEDICINE

## 2018-02-28 PROCEDURE — 80069 RENAL FUNCTION PANEL: CPT | Performed by: FAMILY MEDICINE

## 2018-02-28 PROCEDURE — 83036 HEMOGLOBIN GLYCOSYLATED A1C: CPT | Performed by: FAMILY MEDICINE

## 2018-02-28 RX ORDER — AMLODIPINE BESYLATE 5 MG/1
10 TABLET ORAL DAILY
Qty: 180 TAB | Refills: 3 | Status: SHIPPED | OUTPATIENT
Start: 2018-02-28 | End: 2019-02-28 | Stop reason: SDUPTHER

## 2018-02-28 RX ORDER — CLOPIDOGREL BISULFATE 75 MG/1
75 TABLET ORAL DAILY
Qty: 90 TAB | Refills: 3 | Status: SHIPPED | OUTPATIENT
Start: 2018-02-28 | End: 2019-03-26 | Stop reason: SDUPTHER

## 2018-02-28 RX ORDER — METOPROLOL TARTRATE 50 MG/1
25 TABLET ORAL 2 TIMES DAILY
Qty: 90 TAB | Refills: 3 | Status: SHIPPED | OUTPATIENT
Start: 2018-02-28 | End: 2018-07-10 | Stop reason: SDUPTHER

## 2018-02-28 NOTE — MR AVS SNAPSHOT
[facility] 
 
 
 [address] [facility] [address] [facility] [address] 
[phone] Patient: Mellisa Augustine MRN: [id number] ADD:1/75/1222 Visit Information Date & Time Provider Department Dept. Phone Encounter #  
 2/28/2018  9:30 AM Leslie Silveira MD [facility] [phone] [id number] Follow-up Instructions Return in about 3 months (around 5/28/2018). Upcoming Health Maintenance Date Due HEMOGLOBIN A1C Q6M 2/23/2018 EYE EXAM RETINAL OR DILATED Q1 5/24/2018 FOOT EXAM Q1 8/23/2018 MICROALBUMIN Q1 8/23/2018 LIPID PANEL Q1 8/23/2018 MEDICARE YEARLY EXAM 11/30/2018 GLAUCOMA SCREENING Q2Y 5/24/2019 DTaP/Tdap/Td series (2 - Tdap) 2/24/2027 Allergies as of 2/28/2018  Review Complete On: 2/28/2018 By: Leslie Silveira MD  
  
 Severity Noted Reaction Type Reactions Valsartan High 07/12/2017    Anaphylaxis Current Immunizations  Reviewed on 2/20/2017 Name Date DTaP 2/24/2017 Influenza High Dose Vaccine PF 8/24/2017  9:30 AM  
 Influenza Vaccine (Quad) PF 12/21/2016 10:55 AM, 9/23/2015  9:50 AM  
 Pneumococcal Conjugate (PCV-13) 11/5/2015 12:37 PM  
 Pneumococcal Polysaccharide (PPSV-23) 11/23/2016 Not reviewed this visit You Were Diagnosed With   
  
 Codes Comments Type 2 diabetes with nephropathy (HCC)    -  Primary ICD-10-CM: E11.21 
ICD-9-CM: 250.40, 583.81 Essential hypertension     ICD-10-CM: I10 
ICD-9-CM: 401.9 CKD (chronic kidney disease), stage III     ICD-10-CM: N18.3 ICD-9-CM: 366. 3 Vitals BP Pulse Temp Resp Height(growth percentile) Weight(growth percentile) 146/68 (BP 1 Location: Left arm, BP Patient Position: Sitting) 62 96.7 °F (35.9 °C) (Oral) 18 5' 1\" (1.549 m) 145 lb 6.4 oz (66 kg) SpO2 BMI OB Status Smoking Status 99% 27.47 kg/m2 Menopause Former Smoker Vitals History BMI and BSA Data Body Mass Index Body Surface Area  
 27.47 kg/m 2 1.69 m 2 Preferred Pharmacy Pharmacy Name Phone [facility] [address]  [phone] Your Updated Medication List  
  
   
This list is accurate as of 2/28/18 10:34 AM.  Always use your most recent med list. amLODIPine 5 mg tablet Commonly known as:  Efren Lords Take 2 Tabs by mouth daily. aspirin delayed-release 81 mg tablet Take 81 mg by mouth daily. Blood-Glucose Meter monitoring kit Check glucose once daily. Re: DM .00 [employer] cane for gait difficulty  
  
 clopidogrel 75 mg Tab Commonly known as:  PLAVIX Take 1 Tab by mouth daily. glucose blood VI test strips strip Commonly known as:  blood glucose test  
Check glucose once daily. Re: DM .00 Lancets Misc Check glucose once daily. Re: DM .00  
  
 meclizine 25 mg tablet Commonly known as:  ANTIVERT Take 25 mg by mouth three (3) times daily. metoprolol tartrate 50 mg tablet Commonly known as:  LOPRESSOR Take 0.5 Tabs by mouth two (2) times a day. simvastatin 5 mg tablet Commonly known as:  ZOCOR  
take 1 tablet by mouth at bedtime Prescriptions Sent to Pharmacy Refills  
 metoprolol tartrate (LOPRESSOR) 50 mg tablet 3 Sig: Take 0.5 Tabs by mouth two (2) times a day. Class: Normal  
 [facility] [address] Ph #: [phone] Route: Oral  
 clopidogrel (PLAVIX) 75 mg tab 3 Sig: Take 1 Tab by mouth daily. Class: Normal  
 [facility] . Ph #: [phone] Route: Oral  
 amLODIPine (NORVASC) 5 mg tablet 3 Sig: Take 2 Tabs by mouth daily.   
 Class: Normal  
 Pharmacy: [address] [place] [address] Jeremiah Valerio ERLIN AWAD  #: [phone] Route: Oral  
  
Follow-up Instructions Return in about 3 months (around 5/28/2018). Introducing [facility]! [facility] introduces [employer] patient portal. Now you can access parts of your medical record, email your doctor's office, and request medication refills online. 1. In your internet browser, go to [email or web address]. [email or web address] 2. Click on the First Time User? Click Here link in the Sign In box. You will see the New Member Sign Up page. 3. Enter your [employer] Access Code exactly as it appears below. You will not need to use this code after youve completed the sign-up process. If you do not sign up before the expiration date, you must request a new code. · [employer] Access Code: [email or web address] Expires: 5/29/2018  9:53 AM 
 
4. Enter the last four digits of your Social Security Number (xxxx) and Date of Birth (mm/dd/yyyy) as indicated and click Submit. You will be taken to the next sign-up page. 5. Create a [employer] ID. This will be your [employer] login ID and cannot be changed, so think of one that is secure and easy to remember. 6. Create a [employer] password. You can change your password at any time. 7. Enter your Password Reset Question and Answer. This can be used at a later time if you forget your password. 8. Enter your e-mail address. You will receive e-mail notification when new information is available in [address] 9. Click Sign Up. You can now view and download portions of your medical record. 10. Click the Download Summary menu link to download a portable copy of your medical information. If you have questions, please visit the Frequently Asked Questions section of the [employer] website. Remember, [employer] is NOT to be used for urgent needs. For medical emergencies, dial 911. Now available from your iPhone and Android! Please provide this summary of care documentation to your next provider. Your primary care clinician is listed as [address] [place] [address]. If you have any questions after today's visit, please call [phone].

## 2018-02-28 NOTE — PROGRESS NOTES
Denzel Ch is a 80 y.o. female presents in office for f/u. Health Maintenance Due   Topic Date Due    HEMOGLOBIN A1C Q6M  02/23/2018       1. Have you been to the ER, urgent care clinic since your last visit? Hospitalized since your last visit? Yes Manda Vernon 01/11/2018 Gout in hands. 2. Have you seen or consulted any other health care providers outside of the [address] [place] [address] since your last visit? Include any pap smears or colon screening.  No

## 2018-02-28 NOTE — PROGRESS NOTES
SUBJECTIVE:  Shara Castillo is a 80y.o. year old female   Chief Complaint   Patient presents with    Diabetes     f/u    Hypertension     f/u    Cholesterol Problem     f/u    Gout       History of Present Illness:     Her amlodipine was increased to 10 mg daily. Today the BP is elevated; but has been good at other doctors' clinic. She is not certain if she is on 5 or 10 mg daily. She does not c/o any hypotensive symptoms. Her ASHD is controlled. No angina. She went to the ER on 1/11/18 for gout on hand. She is better now. 3-4 days ago she had similar symptom in foot; but that has resolved. She has elevated uric acid and has history of gout. She is following ADA diet for DM II. She is not checking her glucose. She has difficulty walking without assistance. She was ordered a cane; but not using. She is on simvastatin for lipids w/o problem. No Systemic, Cardiovascular or Respiratory symptoms. Past Medical History:   Diagnosis Date    Arteriosclerotic heart disease (ASHD)     CKD (chronic kidney disease), stage III 6/23/2015    CVA (cerebral infarction)     Diabetes ([facility] [address]     Gout 9/23/13    Left Foot    Hypertension     Leg weakness, bilateral 3/18/13     History reviewed. No pertinent surgical history. Current Outpatient Prescriptions   Medication Sig    simvastatin (ZOCOR) 5 mg tablet take 1 tablet by mouth at bedtime    amLODIPine (NORVASC) 5 mg tablet Take 2 Tabs by mouth daily.  clopidogrel (PLAVIX) 75 mg tab take 1 tablet by mouth once daily    meclizine (ANTIVERT) 25 mg tablet Take 25 mg by mouth three (3) times daily.  metoprolol tartrate (LOPRESSOR) 50 mg tablet take 1/2 tablet by mouth twice a day    Blood-Glucose Meter monitoring kit Check glucose once daily. Re: DM .00    glucose blood VI test strips (BLOOD GLUCOSE TEST) strip Check glucose once daily. Re: DM .00    Lancets misc Check glucose once daily.   Re: DM .00    aspirin delayed-release 81 mg tablet Take 81 mg by mouth daily.  Cane nely Quad cane for gait difficulty     No current facility-administered medications for this visit. Allergies   Allergen Reactions    Valsartan Anaphylaxis          Review of Systems:   Constitutional: No fever, chills, night sweats, malaise, dizziness. Cardiovascular: No angina, palpitations, PND, orthopnea, lightheadedness, edema, claudication. Respiratory: No dyspnea, wheeze, pleurisy, hemoptysis, unusual cough or sputum. Gastrointestinal: no nausea/ vomiting, bowel habit change, pain, LI symptoms, melena, hematochezia, anorexia. Psychiatric: No agitation, confusion/disorientation, suicidal or homicidal ideation. OBJECTIVE:  Physical Exam:   Constitutional: General Appearance:  well developed, overweight, nontoxic, in no acute distress. Visit Vitals    /68 (BP 1 Location: Left arm, BP Patient Position: Sitting)    Pulse 62    Temp 96.7 °F (35.9 °C) (Oral)    Resp 18    Ht 5' 1\" (1.549 m)    Wt 145 lb 6.4 oz (66 kg)    SpO2 99%    BMI 27.47 kg/m2     Pulmonary: Respiratory effort: normal; no dyspnea, no retractions, no accessory muscle use. Auscultation: normal & symmetrical air exchange; no rales, no rhonchi, no wheeze; no rubs    Cardiovascular[de-identified] Palpation of Heart: PMI not displaced or enlarged, no thrills, no heaves. Auscultation of Heart: RRR; G I/VI systolic murmur, no rubs, no gallops. Neck: carotid arteries- normal volume & without bruit; no JVD. Extremities: no edema, no active varicosity. GI[de-identified] Normal bowel sounds. No masses; no tenderness; no rebound/rigidity; no CVA tenderness. No hepatosplenomegaly. Psychiatric: Oriented to time, place and person. Musculoskeletal:   NC/AT. Neck-supple. Walking is slow. Hands: no acute swelling, tenderness, heat. Feet/ ankle: no acute swelling, tenderness, heat.     Lab Results   Component Value Date/Time    WBC 7.4 08/23/2017 10:41 AM    HGB 13.2 08/23/2017 10:41 AM    HCT 40.9 08/23/2017 10:41 AM    PLATELET 985 56/86/3260 10:41 AM    MCV 78.1 08/23/2017 10:41 AM     Lab Results   Component Value Date/Time    Sodium 139 08/23/2017 10:41 AM    Potassium 4.1 08/23/2017 10:41 AM    Chloride 105 08/23/2017 10:41 AM    CO2 26 08/23/2017 10:41 AM    Anion gap 8 08/23/2017 10:41 AM    Glucose 103 (H) 08/23/2017 10:41 AM    BUN 23 (H) 08/23/2017 10:41 AM    Creatinine 1.14 08/23/2017 10:41 AM    BUN/Creatinine ratio 20 08/23/2017 10:41 AM    GFR est AA 55 (L) 08/23/2017 10:41 AM    GFR est non-AA 45 (L) 08/23/2017 10:41 AM    Calcium 8.7 08/23/2017 10:41 AM    Bilirubin, total 0.6 08/23/2017 10:41 AM    AST (SGOT) 15 08/23/2017 10:41 AM    Alk. phosphatase 68 08/23/2017 10:41 AM    Protein, total 7.9 08/23/2017 10:41 AM    Albumin 3.6 08/23/2017 10:41 AM    Globulin 4.3 (H) 08/23/2017 10:41 AM    A-G Ratio 0.8 08/23/2017 10:41 AM    ALT (SGPT) 17 08/23/2017 10:41 AM     Lab Results   Component Value Date/Time    TSH 1.79 08/23/2017 10:41 AM     Lab Results   Component Value Date/Time    Cholesterol, total 124 08/23/2017 10:41 AM    HDL Cholesterol 68 (H) 08/23/2017 10:41 AM    LDL, calculated 35 08/23/2017 10:41 AM    VLDL, calculated 21 08/23/2017 10:41 AM    Triglyceride 105 08/23/2017 10:41 AM    CHOL/HDL Ratio 1.8 08/23/2017 10:41 AM     Lab Results   Component Value Date/Time    Hemoglobin A1c 6.8 (H) 08/23/2017 02:01 PM    Hemoglobin A1c (POC) 6.3 02/20/2017 10:23 AM        ASSESSMENT:     1. Type 2 diabetes with nephropathy ([address]    2. Essential hypertension    3. CKD (chronic kidney disease), stage III    4. Overweight        PLAN:     Pharmacologic Management: Medications reviewed with the patient. No change.     Orders Placed This Encounter    HEMOGLOBIN A1C WITH EAG    RENAL FUNCTION PANEL    GLUCOSE, RANDOM    VITAMIN D, 25 HYDROXY    metoprolol tartrate (LOPRESSOR) 50 mg tablet    clopidogrel (PLAVIX) 75 mg tab    amLODIPine (NORVASC) 5 mg tablet     Discussed the patient's BMI with her. The BMI follow up plan is as follows:   -dietary management education, guidance, and counseling  -encourage exercise  -monitor weight prescribed dietary intake  -reviewed instructions for diet/ exercise. Discussed DDx, follow-up & work-up. Discussed risk/benefit & side effect of treatment. Follow up visit as scheduled in 3 months, prn sooner. Low salt ADA diet & graduated excecise. Recommend home glucose monitoring. Call with BP reading PRN. Follow up with cardiology. Health risk from non adherence discussed. Patient/ daughter voiced understanding.      Thai Perry MD

## 2018-03-01 NOTE — PROGRESS NOTES
Glucose was high and HgbA1c was higher than 6 months ago. Please follow diabetic diet and check glucose at home regularly. Vitamin D was low. Take 1000 units of Vitamin D3 (OTC) daily.

## 2018-03-05 ENCOUNTER — TELEPHONE (OUTPATIENT)
Dept: FAMILY MEDICINE CLINIC | Age: 83
End: 2018-03-05

## 2018-03-05 NOTE — TELEPHONE ENCOUNTER
----- Message from Leslie Silveira MD sent at 3/1/2018  1:18 PM EST -----  Glucose was high and HgbA1c was higher than 6 months ago. Please follow diabetic diet and check glucose at home regularly. Vitamin D was low. Take 1000 units of Vitamin D3 (OTC) daily.

## 2018-03-07 DIAGNOSIS — E11.21 TYPE 2 DIABETES WITH NEPHROPATHY (HCC): Primary | ICD-10-CM

## 2018-03-07 RX ORDER — INSULIN PUMP SYRINGE, 3 ML
EACH MISCELLANEOUS
Qty: 1 KIT | Refills: 0 | OUTPATIENT
Start: 2018-03-07

## 2018-03-07 RX ORDER — INSULIN PUMP SYRINGE, 3 ML
EACH MISCELLANEOUS
Qty: 1 KIT | Refills: 0 | Status: SHIPPED | OUTPATIENT
Start: 2018-03-07

## 2018-03-07 NOTE — TELEPHONE ENCOUNTER
Pts daughter calling to have glucometer and testing supplies sent to [address]. Advised of 72 hour wait time.  Pt has not had a gluometer before

## 2018-03-07 NOTE — TELEPHONE ENCOUNTER
Dr. Augie Garcia, please see refill request for patient, thank you!     Requested Prescriptions     Pending Prescriptions Disp Refills    Blood-Glucose Meter monitoring kit 1 Kit 0     Sig: Patient to test blood sugar daily E11.9

## 2018-03-19 ENCOUNTER — TELEPHONE (OUTPATIENT)
Dept: FAMILY MEDICINE CLINIC | Age: 83
End: 2018-03-19

## 2018-03-19 NOTE — TELEPHONE ENCOUNTER
Pharmacy is calling to follow up on form that was sent to us on 03/12 for the Glucose meter requirement for medicare B. Please advise.

## 2018-03-19 NOTE — TELEPHONE ENCOUNTER
Pt's daughter Junaid Innocent calling to follow up on paperwork sent by pharmacy for the glucometer. Pt's daughter would like to speak with a nurse. She would like to know the status of this paperwork and why it is taking so long. Pt's daughter is on patient's permission to release. She may be reached at [phone]. Please advise.

## 2018-04-16 NOTE — TELEPHONE ENCOUNTER
Pt's daughter is requesting to receive a call back from [address] [place] [address]. Pt hasn't been able to p/u the glucometer, was told by the pharmacy they're waiting on additional info from the doctor. Called pharmacy they have the form and are requesting an order for 90 days lancets and One touch ultra test strips, so they can bill it together with the glucometer. Please advise.

## 2018-05-01 ENCOUNTER — TELEPHONE (OUTPATIENT)
Dept: FAMILY MEDICINE CLINIC | Age: 83
End: 2018-05-01

## 2018-05-01 NOTE — TELEPHONE ENCOUNTER
Patient's daughter is calling stating that pharmacy has not received the prescription for the test strips from 4/16/18. They have the meter and the lancets but not the test strips. Please send the order for the test strips over to [address] [place] [address]

## 2018-05-02 NOTE — TELEPHONE ENCOUNTER
Pharmacy has been called and given verbal order for test strips. Patients daughter has been called and notified. Closing encounter.

## 2018-05-20 RX ORDER — SIMVASTATIN 5 MG/1
TABLET, FILM COATED ORAL
Qty: 90 TAB | Refills: 0 | Status: SHIPPED | OUTPATIENT
Start: 2018-05-20 | End: 2018-05-28 | Stop reason: SDUPTHER

## 2018-05-28 RX ORDER — SIMVASTATIN 5 MG/1
TABLET, FILM COATED ORAL
Qty: 90 TAB | Refills: 3 | Status: SHIPPED | OUTPATIENT
Start: 2018-05-28 | End: 2019-06-23 | Stop reason: SDUPTHER

## 2018-05-30 ENCOUNTER — OFFICE VISIT (OUTPATIENT)
Dept: FAMILY MEDICINE CLINIC | Age: 83
End: 2018-05-30

## 2018-05-30 VITALS
DIASTOLIC BLOOD PRESSURE: 62 MMHG | BODY MASS INDEX: 27.56 KG/M2 | OXYGEN SATURATION: 97 % | HEIGHT: 61 IN | HEART RATE: 64 BPM | WEIGHT: 146 LBS | RESPIRATION RATE: 18 BRPM | SYSTOLIC BLOOD PRESSURE: 140 MMHG | TEMPERATURE: 97.2 F

## 2018-05-30 DIAGNOSIS — I25.10 ASHD (ARTERIOSCLEROTIC HEART DISEASE): ICD-10-CM

## 2018-05-30 DIAGNOSIS — E11.21 TYPE 2 DIABETES WITH NEPHROPATHY (HCC): Primary | ICD-10-CM

## 2018-05-30 DIAGNOSIS — I10 ESSENTIAL HYPERTENSION: ICD-10-CM

## 2018-05-30 LAB
GLUCOSE POC: 202 MG/DL
HBA1C MFR BLD HPLC: 6.7 %

## 2018-05-30 NOTE — PROGRESS NOTES
SUBJECTIVE:  Brandon Torres is a 80y.o. year old female   Chief Complaint   Patient presents with    Diabetes    Hypertension       History of Present Illness:     Her BP has been good at other doctors' clinic and home. She does not c/o any hypotensive symptoms. Her ASHD is controlled. No angina. She has elevated uric acid and has history of gout. No gout attacks since last visit here. She is following ADA diet for DM II. She is not checking her glucose. She has difficulty walking without assistance. She was ordered a cane; but not using. She is on simvastatin for lipids w/o problem. She has a history of vertigo and takes as needed Antivert. She has not been needing this for last several months. No Systemic, Cardiovascular or Respiratory symptoms. Past Medical History:   Diagnosis Date    Arteriosclerotic heart disease (ASHD)     CKD (chronic kidney disease), stage III 6/23/2015    CVA (cerebral infarction)     Diabetes ([facility] [address]     Gout 9/23/13    Left Foot    Hypertension     Leg weakness, bilateral 3/18/13     History reviewed. No pertinent surgical history. Current Outpatient Prescriptions   Medication Sig    simvastatin (ZOCOR) 5 mg tablet take 1 tablet by mouth at bedtime    metoprolol tartrate (LOPRESSOR) 50 mg tablet Take 0.5 Tabs by mouth two (2) times a day.  clopidogrel (PLAVIX) 75 mg tab Take 1 Tab by mouth daily.  amLODIPine (NORVASC) 5 mg tablet Take 2 Tabs by mouth daily.  aspirin delayed-release 81 mg tablet Take 81 mg by mouth daily.  Blood-Glucose Meter monitoring kit Check glucose once daily. Re: DM .00    meclizine (ANTIVERT) 25 mg tablet Take 25 mg by mouth three (3) times daily.  glucose blood VI test strips (BLOOD GLUCOSE TEST) strip Check glucose once daily. Re: DM .00    Lancets misc Check glucose once daily. Re: DM .00     No current facility-administered medications for this visit.         Allergies   Allergen Reactions    Valsartan Anaphylaxis          Review of Systems:   Constitutional: No fever, chills, night sweats, malaise, dizziness. Cardiovascular: No angina, palpitations, PND, orthopnea, lightheadedness, edema, claudication. Respiratory: No dyspnea, wheeze, pleurisy, hemoptysis, unusual cough or sputum. Gastrointestinal: no nausea/ vomiting, bowel habit change, pain, LI symptoms, melena, hematochezia, anorexia. Psychiatric: No agitation, confusion/disorientation, suicidal or homicidal ideation. OBJECTIVE:  Physical Exam:   Constitutional: General Appearance:  well developed, overweight, nontoxic, in no acute distress. Visit Vitals    /62 (BP 1 Location: Right arm, BP Patient Position: Sitting)    Pulse 64    Temp 97.2 °F (36.2 °C) (Oral)    Resp 18    Ht 5' 1\" (1.549 m)    Wt 146 lb (66.2 kg)    SpO2 97%    BMI 27.59 kg/m2     Pulmonary: Respiratory effort: normal; no dyspnea, no retractions, no accessory muscle use. Auscultation: normal & symmetrical air exchange; no rales, no rhonchi, no wheeze; no rubs    Cardiovascular[de-identified] Palpation of Heart: PMI not displaced or enlarged, no thrills, no heaves. Auscultation of Heart: RRR; G I/VI systolic murmur, no rubs, no gallops. Neck: carotid arteries- normal volume & without bruit; no JVD. Extremities: no edema, no active varicosity. GI[de-identified] Normal bowel sounds. No masses; no tenderness; no rebound/rigidity; no CVA tenderness. No hepatosplenomegaly. Psychiatric: Oriented to time, place and person. Musculoskeletal:   NC/AT. Neck-supple. Walking is slow. Hands: no acute swelling, tenderness, heat. Feet/ ankle: no acute swelling, tenderness, heat.     Lab Results   Component Value Date/Time    WBC 7.4 08/23/2017 10:41 AM    HGB 13.2 08/23/2017 10:41 AM    HCT 40.9 08/23/2017 10:41 AM    PLATELET 948 01/18/8842 10:41 AM    MCV 78.1 08/23/2017 10:41 AM     Lab Results   Component Value Date/Time    Sodium 142 02/28/2018 10:32 AM Potassium 3.8 02/28/2018 10:32 AM    Chloride 106 02/28/2018 10:32 AM    CO2 25 02/28/2018 10:32 AM    Anion gap 11 02/28/2018 10:32 AM    Glucose 236 (H) 02/28/2018 10:32 AM    BUN 25 (H) 02/28/2018 10:32 AM    Creatinine 1.26 02/28/2018 10:32 AM    BUN/Creatinine ratio 20 02/28/2018 10:32 AM    GFR est AA 49 (L) 02/28/2018 10:32 AM    GFR est non-AA 40 (L) 02/28/2018 10:32 AM    Calcium 8.9 02/28/2018 10:32 AM    Bilirubin, total 0.6 08/23/2017 10:41 AM    AST (SGOT) 15 08/23/2017 10:41 AM    Alk. phosphatase 68 08/23/2017 10:41 AM    Protein, total 7.9 08/23/2017 10:41 AM    Albumin 3.4 02/28/2018 10:32 AM    Globulin 4.3 (H) 08/23/2017 10:41 AM    A-G Ratio 0.8 08/23/2017 10:41 AM    ALT (SGPT) 17 08/23/2017 10:41 AM     Lab Results   Component Value Date/Time    TSH 1.79 08/23/2017 10:41 AM     Lab Results   Component Value Date/Time    Cholesterol, total 124 08/23/2017 10:41 AM    HDL Cholesterol 68 (H) 08/23/2017 10:41 AM    LDL, calculated 35 08/23/2017 10:41 AM    VLDL, calculated 21 08/23/2017 10:41 AM    Triglyceride 105 08/23/2017 10:41 AM    CHOL/HDL Ratio 1.8 08/23/2017 10:41 AM     Lab Results   Component Value Date/Time    Hemoglobin A1c 7.6 (H) 02/28/2018 10:32 AM    Hemoglobin A1c (POC) 6.7 05/30/2018 10:23 AM        ASSESSMENT:     1. Type 2 diabetes with nephropathy ([address]    2. Essential hypertension    3. ASHD (arteriosclerotic heart disease)        PLAN:     Pharmacologic Management: Medications reviewed with the patient. No change. HM: Advised about yearly diabetic eye examination. Orders Placed This Encounter    AMB POC HEMOGLOBIN A1C    AMB POC GLUCOSE, QUANTITATIVE, BLOOD     Discussed DDx, follow-up & work-up. Discussed risk/benefit & side effect of treatment. Follow up visit as scheduled in 3 months, prn sooner. Low salt ADA diet & graduated excecise. Recommend home BP and glucose monitoring. Call with readings PRN. Health risk from non adherence discussed.   Patient/ daughter voiced understanding. Follow-up Disposition:  Return in about 3 months (around 8/30/2018) for fasting.     Ankit Mcguire MD

## 2018-05-30 NOTE — MR AVS SNAPSHOT
[address] [facility] 
 
 
 [address] [facility] [address] [facility] [address] 
[phone] Patient: Rosa Escudero MRN: [id number] HLS:[id number]/60/[id number] Visit Information Date & Time Provider Department Dept. Phone Encounter #  
 5/30/2018  9:30 AM Sacha Beard MD [facility][note 3] [facility] [phone] [id number] Follow-up Instructions Return in about 3 months (around 8/30/2018) for fasting. Your Appointments 8/29/2018 11:00 AM  
Follow Up with Sacha Beard MD  
[facility][note 3] [facility] Appt Note: 3 month f/u from 5/30/18  
 [address] [address]  
[phone]  
  
   
 [facility] [address] [id number] Upcoming Health Maintenance Date Due  
 EYE EXAM RETINAL OR DILATED Q1 5/24/2018 Influenza Age 5 to Adult 8/1/2018 FOOT EXAM Q1 8/23/2018 MICROALBUMIN Q1 8/23/2018 LIPID PANEL Q1 8/23/2018 HEMOGLOBIN A1C Q6M 8/28/2018 MEDICARE YEARLY EXAM 11/30/2018 GLAUCOMA SCREENING Q2Y 5/24/2019 DTaP/Tdap/Td series (2 - Tdap) 2/24/2027 Allergies as of 5/30/2018  Review Complete On: 5/30/2018 By: Sacha Beard MD  
  
 Severity Noted Reaction Type Reactions Valsartan High 07/12/2017    Anaphylaxis Current Immunizations  Reviewed on 3/6/2018 Name Date DTaP 2/24/2017 Influenza High Dose Vaccine PF 8/24/2017  9:30 AM  
 Influenza Vaccine (Quad) PF 12/21/2016 10:55 AM, 9/23/2015  9:50 AM  
 Pneumococcal Conjugate (PCV-13) 11/5/2015 12:37 PM  
 Pneumococcal Polysaccharide (PPSV-23) 11/23/2016 Not reviewed this visit You Were Diagnosed With   
  
 Codes Comments Type 2 diabetes with nephropathy (HCC)    -  Primary ICD-10-CM: E11.21 
ICD-9-CM: 250.40, 583.81 Vitals BP Pulse Temp Resp Height(growth percentile) Weight(growth percentile) 140/62 (BP 1 Location: Right arm, BP Patient Position: Sitting) 64 97.2 °F (36.2 °C) (Oral) 18 5' 1\" (1.549 m) 146 lb (66.2 kg) SpO2 BMI OB Status Smoking Status 97% 27.59 kg/m2 Menopause Former Smoker Vitals History BMI and BSA Data Body Mass Index Body Surface Area  
 27.59 kg/m 2 1.69 m 2 Preferred Pharmacy Pharmacy Name Phone [id number] [address]  [phone] Your Updated Medication List  
  
   
This list is accurate as of 5/30/18 10:28 AM.  Always use your most recent med list. amLODIPine 5 mg tablet Commonly known as:  Remonia Grates Take 2 Tabs by mouth daily. aspirin delayed-release 81 mg tablet Take 81 mg by mouth daily. Blood-Glucose Meter monitoring kit Check glucose once daily. Re: DM .00  
  
 clopidogrel 75 mg Tab Commonly known as:  PLAVIX Take 1 Tab by mouth daily. glucose blood VI test strips strip Commonly known as:  blood glucose test  
Check glucose once daily. Re: DM .00 Lancets Misc Check glucose once daily. Re: DM .00  
  
 meclizine 25 mg tablet Commonly known as:  ANTIVERT Take 25 mg by mouth three (3) times daily. metoprolol tartrate 50 mg tablet Commonly known as:  LOPRESSOR Take 0.5 Tabs by mouth two (2) times a day. simvastatin 5 mg tablet Commonly known as:  ZOCOR  
take 1 tablet by mouth at bedtime We Performed the Following AMB POC GLUCOSE, QUANTITATIVE, BLOOD [77453 CPT(R)] AMB POC HEMOGLOBIN A1C [36655 CPT(R)] Follow-up Instructions Return in about 3 months (around 8/30/2018) for fasting. Introducing [facility]! Eleanor Gallardo introduces [employer] patient portal. Now you can access parts of your medical record, email your doctor's office, and request medication refills online.    
 
1. In your internet browser, go to [email or web address] 2. Click on the First Time User? Click Here link in the Sign In box. You will see the New Member Sign Up page. 3. Enter your CC video Access Code exactly as it appears below. You will not need to use this code after youve completed the sign-up process. If you do not sign up before the expiration date, you must request a new code. · CC video Access Code: [email or web address] Expires: 8/28/2018 10:27 AM 
 
4. Enter the last four digits of your Social Security Number (xxxx) and Date of Birth (mm/dd/yyyy) as indicated and click Submit. You will be taken to the next sign-up page. 5. Create a CC video ID. This will be your CC video login ID and cannot be changed, so think of one that is secure and easy to remember. 6. Create a CC video password. You can change your password at any time. 7. Enter your Password Reset Question and Answer. This can be used at a later time if you forget your password. 8. Enter your e-mail address. You will receive e-mail notification when new information is available in [address] 9. Click Sign Up. You can now view and download portions of your medical record. 10. Click the Download Summary menu link to download a portable copy of your medical information. If you have questions, please visit the Frequently Asked Questions section of the CC video website. Remember, CC video is NOT to be used for urgent needs. For medical emergencies, dial 911. Now available from your iPhone and Android! Please provide this summary of care documentation to your next provider. Your primary care clinician is listed as Art Towanda. If you have any questions after today's visit, please call [phone].

## 2018-06-01 NOTE — PROGRESS NOTES
Random nonfasting glucose was high and hemoglobin A1c was higher than in the past.  The patient is advised to continue diet and exercise as tolerated. Also please check the glucose at home regularly. This will need follow-up.

## 2018-06-04 ENCOUNTER — TELEPHONE (OUTPATIENT)
Dept: FAMILY MEDICINE CLINIC | Age: 83
End: 2018-06-04

## 2018-06-04 NOTE — TELEPHONE ENCOUNTER
Maura Farrell MD  P [facility] Nurse Pool                     Random nonfasting glucose was high and hemoglobin A1c was higher than in the past.  The patient is advised to continue diet and exercise as tolerated.  Also please check the glucose at home regularly.  This will need follow-up.              LVM will call again.

## 2018-06-18 NOTE — TELEPHONE ENCOUNTER
Patient has been waiting since feb.2018.  Order form need to be fill out    Please call daughter Bradd Lesches  [phone]

## 2018-07-10 RX ORDER — METOPROLOL TARTRATE 50 MG/1
25 TABLET ORAL 2 TIMES DAILY
Qty: 90 TAB | Refills: 3 | Status: SHIPPED | OUTPATIENT
Start: 2018-07-10 | End: 2018-11-28 | Stop reason: SDUPTHER

## 2018-07-10 NOTE — TELEPHONE ENCOUNTER
Her HgbA1c was high; but below 7. If glucose go up at times, it may be due diet or lack of activity or poor fluid intake. If it persists, then benefit of adding a mediation for it will out-weigh the risk of it in a 80year old lady.

## 2018-07-10 NOTE — TELEPHONE ENCOUNTER
Patient's daughter was notified of message below, and stated that she would like to know a certain range of when they should call regard the blood sugar. Spoke to Dr. Jaye Mercedes who stated that they should continue to monitor and that if it is consistently over 200 to call us. Verbal understanding given. Closing encounter.

## 2018-07-10 NOTE — TELEPHONE ENCOUNTER
Need a refill of metoprolol, started checking glucose on Sunday the reading was 238 the daughter wanted to know what is the acceptable range for her mother's glucose reading. Refill pended, please advise.

## 2018-08-29 ENCOUNTER — OFFICE VISIT (OUTPATIENT)
Dept: FAMILY MEDICINE CLINIC | Age: 83
End: 2018-08-29

## 2018-08-29 VITALS
SYSTOLIC BLOOD PRESSURE: 130 MMHG | OXYGEN SATURATION: 98 % | BODY MASS INDEX: 26.47 KG/M2 | HEIGHT: 61 IN | RESPIRATION RATE: 14 BRPM | WEIGHT: 140.2 LBS | DIASTOLIC BLOOD PRESSURE: 86 MMHG | TEMPERATURE: 97.5 F | HEART RATE: 62 BPM

## 2018-08-29 DIAGNOSIS — I10 ESSENTIAL HYPERTENSION: ICD-10-CM

## 2018-08-29 DIAGNOSIS — M1A.0790 IDIOPATHIC CHRONIC GOUT OF FOOT WITHOUT TOPHUS, UNSPECIFIED LATERALITY: ICD-10-CM

## 2018-08-29 DIAGNOSIS — Z23 ENCOUNTER FOR IMMUNIZATION: ICD-10-CM

## 2018-08-29 DIAGNOSIS — E11.21 TYPE 2 DIABETES WITH NEPHROPATHY (HCC): Primary | ICD-10-CM

## 2018-08-29 DIAGNOSIS — E66.3 OVERWEIGHT: ICD-10-CM

## 2018-08-29 NOTE — PROGRESS NOTES
Moe Vanegas is a 80 y.o. female and presents for annual Medicare Wellness Visit. Problem List: Reviewed with patient and discussed risk factors. Patient Active Problem List  
Diagnosis Code  Gout M10.9  Leg weakness, bilateral R29.898  Hypertension I10  
 CKD (chronic kidney disease), stage III N18.3  ASHD (arteriosclerotic heart disease) I25.10  Heart murmur R01.1  Eczema L30.9  BPV (benign positional vertigo) H81.10  Type 2 diabetes with nephropathy (HCC) E11.21  
 Overweight E66.3 Current medical providers:  Patient Care Team: 
Romero Rodríguez MD as PCP - General (Family Practice) August Raya MD as Physician (Oncology) OLIVER Colon MD as Physician (Cardiology) PMH: Reviewed with patient Past Medical History:  
Diagnosis Date  Arteriosclerotic heart disease (ASHD)  CKD (chronic kidney disease), stage III 6/23/2015  CVA (cerebral infarction)  Diabetes ([facility] [address]  Gout 9/23/13 Left Foot  Hypertension  Leg weakness, bilateral 3/18/13 PSH: Reviewed with patient History reviewed. No pertinent surgical history. SH: Reviewed with patient Social History Substance Use Topics  Smoking status: Former Smoker Quit date: 1/1/2005  Smokeless tobacco: Never Used Comment: does not remember how many years for smoking/packs  Alcohol use No  
 
 
FH: Reviewed with patient History reviewed. No pertinent family history. Medications/Allergies: Reviewed with patient Current Outpatient Prescriptions on File Prior to Visit Medication Sig Dispense Refill  metoprolol tartrate (LOPRESSOR) 50 mg tablet Take 0.5 Tabs by mouth two (2) times a day. 90 Tab 3  
 glucose blood VI test strips (BLOOD GLUCOSE TEST) strip Check glucose once daily. Re: DM .00 100 Strip 1  simvastatin (ZOCOR) 5 mg tablet take 1 tablet by mouth at bedtime 90 Tab 3  
  Blood-Glucose Meter monitoring kit Check glucose once daily. Re: DM .00 1 Kit 0  
 clopidogrel (PLAVIX) 75 mg tab Take 1 Tab by mouth daily. 90 Tab 3  
 amLODIPine (NORVASC) 5 mg tablet Take 2 Tabs by mouth daily. 180 Tab 3  Lancets misc Check glucose once daily. Re: DM .00 1 Each 11  
 aspirin delayed-release 81 mg tablet Take 81 mg by mouth daily.  meclizine (ANTIVERT) 25 mg tablet Take 25 mg by mouth three (3) times daily. No current facility-administered medications on file prior to visit. Allergies Allergen Reactions  Valsartan Anaphylaxis Objective: 
Visit Vitals  /86 (BP 1 Location: Right arm, BP Patient Position: Sitting)  Pulse 62  Temp 97.5 °F (36.4 °C) (Oral)  Resp 14  
 Ht 5' 1\" (1.549 m)  Wt 140 lb 3.2 oz (63.6 kg)  SpO2 98%  BMI 26.49 kg/m2 Body mass index is 26.49 kg/(m^2). Assessment of cognitive impairment:  
Alert and oriented x 3 Oriented to situation? yes Memory Problem? no 
Cognition Problem? no 
 
Depression Screen: PHQ over the last two weeks 8/29/2018 Little interest or pleasure in doing things Not at all Feeling down, depressed, irritable, or hopeless Not at all Total Score PHQ 2 0 Abuse Screen:  
Abuse Screening Questionnaire 2/28/2018 Do you ever feel afraid of your partner? Mikal March Are you in a relationship with someone who physically or mentally threatens you? Mikal March Is it safe for you to go home? [place] Fall Risk Assessment:   
Fall Risk Assessment, last 12 mths 8/29/2018 Able to walk? Yes Fall in past 12 months? No  
 
 
Functional Ability:  
Does the patient exhibit a steady gait? No, slow with gout How long did it take the patient to get up and walk from a sitting position? 2 sec Is the patient self reliant?  (ie can do own laundry, meals, household chores)  Yes, with help Does the patient handle his/her own medications? Yes, with help Does the patient handle his/her own money? Yes, with help Is the patients home safe (ie good lighting, handrails on stairs and bath, etc.)? yes Did you notice or did patient express any hearing difficulties? no 
  
Did you notice of did patient express any vision difficulties? No, has glasses Were distance and reading eye charts used? yes Advance Care Planning:  
Patient was offered the opportunity to discuss advance care planning:  yes Does patient have an Advance Directive:  no If no, did you provide information on Caring Connections? yes Plan:   
 
Orders Placed This Encounter  HEMOGLOBIN A1C WITH EAG  
 LIPID PANEL  
 TSH 3RD GENERATION  
 URIC ACID  
 MICROALBUMIN, UR, RAND W/ MICROALB/CREAT RATIO Health Maintenance Topic Date Due  
 EYE EXAM RETINAL OR DILATED Q1  05/24/2018  Influenza Age 5 to Adult  08/01/2018  
 FOOT EXAM Q1  08/23/2018  MICROALBUMIN Q1  08/23/2018  LIPID PANEL Q1  08/23/2018  MEDICARE YEARLY EXAM  11/30/2018  HEMOGLOBIN A1C Q6M  11/30/2018  GLAUCOMA SCREENING Q2Y  05/24/2019  
 DTaP/Tdap/Td series (2 - Tdap) 02/24/2027  Bone Densitometry (Dexa) Screening  Completed  ZOSTER VACCINE AGE 60>  Addressed  Pneumococcal 65+ Low/Medium Risk  Completed *Patient verbalized understanding and agreement with the plan. A copy of the After Visit Summary with personalized health plan was given to the patient today.

## 2018-08-29 NOTE — PROGRESS NOTES
SUBJECTIVE: 
Jak Poole is a 80y.o. year old female Chief Complaint Patient presents with  Hypertension  Diabetes History of Present Illness:  
 
Her BP has been good at other doctors' clinic and home. She does not c/o any hypotensive symptoms. Her ASHD is controlled. No angina. She has elevated uric acid and has history of gout. No gout attacks since last visit here. She is following ADA diet for DM II. She is not checking her glucose. She is on simvastatin for lipids w/o problem. She has a history of vertigo and takes as needed Antivert. She has not been needing this for last several months. No Systemic, Cardiovascular or Respiratory symptoms. Past Medical History:  
Diagnosis Date  Arteriosclerotic heart disease (ASHD)  CKD (chronic kidney disease), stage III 6/23/2015  CVA (cerebral infarction)  Diabetes ([facility] [address]  Gout 9/23/13 Left Foot  Hypertension  Leg weakness, bilateral 3/18/13 History reviewed. No pertinent surgical history. Current Outpatient Prescriptions Medication Sig  
 metoprolol tartrate (LOPRESSOR) 50 mg tablet Take 0.5 Tabs by mouth two (2) times a day.  glucose blood VI test strips (BLOOD GLUCOSE TEST) strip Check glucose once daily. Re: DM .00  
 simvastatin (ZOCOR) 5 mg tablet take 1 tablet by mouth at bedtime  Blood-Glucose Meter monitoring kit Check glucose once daily. Re: DM .00  clopidogrel (PLAVIX) 75 mg tab Take 1 Tab by mouth daily.  amLODIPine (NORVASC) 5 mg tablet Take 2 Tabs by mouth daily.  Lancets misc Check glucose once daily. Re: DM .00  aspirin delayed-release 81 mg tablet Take 81 mg by mouth daily.  meclizine (ANTIVERT) 25 mg tablet Take 25 mg by mouth three (3) times daily. No current facility-administered medications for this visit. Allergies Allergen Reactions  Valsartan Anaphylaxis Review of Systems: Constitutional: No fever, chills, night sweats, malaise, dizziness. Cardiovascular: No angina, palpitations, PND, orthopnea, lightheadedness, edema, claudication. Respiratory: No dyspnea, wheeze, pleurisy, hemoptysis, unusual cough or sputum. Gastrointestinal: no nausea/ vomiting, bowel habit change, pain, LI symptoms, melena, hematochezia, anorexia. Psychiatric: No agitation, confusion/disorientation, suicidal or homicidal ideation. OBJECTIVE: 
Physical Exam:  
Constitutional: General Appearance:  well developed, overweight, nontoxic, in no acute distress. Visit Vitals  /86 (BP 1 Location: Right arm, BP Patient Position: Sitting)  Pulse 62  Temp 97.5 °F (36.4 °C) (Oral)  Resp 14  
 Ht 5' 1\" (1.549 m)  Wt 140 lb 3.2 oz (63.6 kg)  SpO2 98%  BMI 26.49 kg/m2 Pulmonary: Respiratory effort: normal; no dyspnea, no retractions, no accessory muscle use. Auscultation: normal & symmetrical air exchange; no rales, no rhonchi, no wheeze; no rubs Cardiovascular[de-identified] Palpation of Heart: PMI not displaced or enlarged, no thrills, no heaves. Auscultation of Heart: RRR; G I/VI systolic murmur, no rubs, no gallops. Neck: carotid arteries- normal volume & without bruit; no JVD. Extremities: no edema, no active varicosity. GI[de-identified] Normal bowel sounds. No masses; no tenderness; no rebound/rigidity; no CVA tenderness. No hepatosplenomegaly. Psychiatric: Oriented to time, place and person. Musculoskeletal:  
NC/AT. Neck-supple. Walking is slow. Hands: no acute swelling, tenderness, heat. Feet/ ankle: no acute swelling, tenderness, heat. Diabetic foot exam:  
 
Left Foot: 
 Visual Exam: normal  
 Pulse DP: 1+ (weak) Filament test: normal sensation Vibratory sensation: normal 
   
Right Foot: 
 Visual Exam: normal  
 Pulse DP: 1+ (weak) Filament test: normal sensation Vibratory sensation: normal 
 
 
Lab Results Component Value Date/Time WBC 7.4 08/23/2017 10:41 AM  
 HGB 13.2 08/23/2017 10:41 AM  
 HCT 40.9 08/23/2017 10:41 AM  
 PLATELET 023 79/31/1983 10:41 AM  
 MCV 78.1 08/23/2017 10:41 AM  
 
Lab Results Component Value Date/Time Sodium 142 02/28/2018 10:32 AM  
 Potassium 3.8 02/28/2018 10:32 AM  
 Chloride 106 02/28/2018 10:32 AM  
 CO2 25 02/28/2018 10:32 AM  
 Anion gap 11 02/28/2018 10:32 AM  
 Glucose 236 (H) 02/28/2018 10:32 AM  
 BUN 25 (H) 02/28/2018 10:32 AM  
 Creatinine 1.26 02/28/2018 10:32 AM  
 BUN/Creatinine ratio 20 02/28/2018 10:32 AM  
 GFR est AA 49 (L) 02/28/2018 10:32 AM  
 GFR est non-AA 40 (L) 02/28/2018 10:32 AM  
 Calcium 8.9 02/28/2018 10:32 AM  
 Bilirubin, total 0.6 08/23/2017 10:41 AM  
 AST (SGOT) 15 08/23/2017 10:41 AM  
 Alk. phosphatase 68 08/23/2017 10:41 AM  
 Protein, total 7.9 08/23/2017 10:41 AM  
 Albumin 3.4 02/28/2018 10:32 AM  
 Globulin 4.3 (H) 08/23/2017 10:41 AM  
 A-G Ratio 0.8 08/23/2017 10:41 AM  
 ALT (SGPT) 17 08/23/2017 10:41 AM  
 
Lab Results Component Value Date/Time TSH 1.79 08/23/2017 10:41 AM  
 
Lab Results Component Value Date/Time Cholesterol, total 124 08/23/2017 10:41 AM  
 HDL Cholesterol 68 (H) 08/23/2017 10:41 AM  
 LDL, calculated 35 08/23/2017 10:41 AM  
 VLDL, calculated 21 08/23/2017 10:41 AM  
 Triglyceride 105 08/23/2017 10:41 AM  
 CHOL/HDL Ratio 1.8 08/23/2017 10:41 AM  
 
Lab Results Component Value Date/Time Hemoglobin A1c 7.6 (H) 02/28/2018 10:32 AM  
 Hemoglobin A1c (POC) 6.7 05/30/2018 10:23 AM  
 
  
ASSESSMENT:  
 
1. Type 2 diabetes with nephropathy (HCC) 2. Essential hypertension 3. Overweight 4. Idiopathic chronic gout of foot without tophus, unspecified laterality PLAN:  
 
Pharmacologic Management: Medications reviewed with the patient. No change. Orders Placed This Encounter  Influenza Vaccine Inactivated (IIV) (FLUAD), Subunit, Adjuvanted, IM ([id number])  HEMOGLOBIN A1C WITH EAG  
 LIPID PANEL  
  TSH 3RD GENERATION  
 URIC ACID  
 MICROALBUMIN, UR, RAND W/ MICROALB/CREAT RATIO  
 HM DIABETES FOOT EXAM  
 NE IMMUNIZ ADMIN,1 SINGLE/COMB VAC/TOXOID She is getting other BW (CBC and CMP) from oncology. She will come fasting for this blood work and call us for the report. Discussed the patient's BMI with her. The BMI follow up plan is as follows:  
-dietary management education, guidance, and counseling 
-encourage exercise 
-monitor weight prescribed dietary intake 
-printed instructions in AVS. Health maintenance: Advised need for yearly diabetic eye exam. 
 
Discussed DDx, follow-up & work-up. Discussed risk/benefit & side effect of treatment. Follow up visit as scheduled in 3 months, prn sooner. Low salt ADA diet & graduated excecise. Recommend home BP and glucose monitoring. Call with readings PRN. Health risk from non adherence discussed. Patient/ daughter voiced understanding. Follow-up Disposition: 
Return in about 3 months (around 11/29/2018).  
 
Irais Echols MD

## 2018-08-29 NOTE — PATIENT INSTRUCTIONS
Schedule of Personalized Health Plan for [facility] (Provide Copy to Patient) 08/29/18 The best way to stay healthy is to live a healthy lifestyle. A healthy lifestyle includes regular exercise, eating a well-balanced diet, keeping a healthy weight and not smoking. Regular physical exams and screening tests are another important way to take care of yourself. Preventive exams provided by health care providers can find health problems early when treatment works best and can keep you from getting certain diseases or illnesses. Preventive services include exams, lab tests, screenings, shots, monitoring and information to help you take care of your own health. All people over 65 should have a pneumonia shot. Pneumonia shots are usually only needed once in a lifetime unless your doctor decides differently. All people over 65 should have a yearly flu shot. People over 65 are at medium to high risk for Hepatitis B. Three shots are needed for complete protection. In addition to your physical exam, some screening tests are recommended: 
 
 
Screening for Breast Cancer is recommended yearly with a mammogram. 
 
Screening for Cervical Cancer is recommended every two years (annually for certain risk factors, such as previous history of STD or abnormal PAP in past 7 years), with a Pelvic Exam with PAP 
 
 Here is a list of your current Health Maintenance items with a due date: 
Health Maintenance Topic Date Due  
 EYE EXAM RETINAL OR DILATED Q1  05/24/2018  Influenza Age 5 to Adult  08/01/2018  
 FOOT EXAM Q1  08/23/2018  MICROALBUMIN Q1  08/23/2018  LIPID PANEL Q1  08/23/2018  MEDICARE YEARLY EXAM  11/30/2018  HEMOGLOBIN A1C Q6M  11/30/2018  GLAUCOMA SCREENING Q2Y  05/24/2019  
 DTaP/Tdap/Td series (2 - Tdap) 02/24/2027  Bone Densitometry (Dexa) Screening  Completed  ZOSTER VACCINE AGE 60>  Addressed  Pneumococcal 65+ Low/Medium Risk  Completed Preventing Falls: Care Instructions Your Care Instructions Getting around your home safely can be a challenge if you have injuries or health problems that make it easy for you to fall. Loose rugs and furniture in walkways are among the dangers for many older people who have problems walking or who have poor eyesight. People who have conditions such as arthritis, osteoporosis, or dementia also have to be careful not to fall. You can make your home safer with a few simple measures. Follow-up care is a key part of your treatment and safety. Be sure to make and go to all appointments, and call your doctor if you are having problems. It's also a good idea to know your test results and keep a list of the medicines you take. How can you care for yourself at home? Taking care of yourself · You may get dizzy if you do not drink enough water. To prevent dehydration, drink plenty of fluids, enough so that your urine is light yellow or clear like water. Choose water and other caffeine-free clear liquids. If you have kidney, heart, or liver disease and have to limit fluids, talk with your doctor before you increase the amount of fluids you drink. · Exercise regularly to improve your strength, muscle tone, and balance. Walk if you can. Swimming may be a good choice if you cannot walk easily. · Have your vision and hearing checked each year or any time you notice a change. If you have trouble seeing and hearing, you might not be able to avoid objects and could lose your balance. · Know the side effects of the medicines you take. Ask your doctor or pharmacist whether the medicines you take can affect your balance. Sleeping pills or sedatives can affect your balance. · Limit the amount of alcohol you drink. Alcohol can impair your balance and other senses. · Ask your doctor whether calluses or corns on your feet need to be removed. If you wear loose-fitting shoes because of calluses or corns, you can lose your balance and fall. · Talk to your doctor if you have numbness in your feet. Preventing falls at home · Remove raised doorway thresholds, throw rugs, and clutter. Repair loose carpet or raised areas in the floor. · Move furniture and electrical cords to keep them out of walking paths. · Use nonskid floor wax, and wipe up spills right away, especially on ceramic tile floors. · If you use a walker or cane, put rubber tips on it. If you use crutches, clean the bottoms of them regularly with an abrasive pad, such as steel wool. · Keep your house well lit, especially [facility], and outside walkways. Use night-lights in areas such as hallways and bathrooms. Add extra light switches or use remote switches (such as switches that go on or off when you clap your hands) to make it easier to turn lights on if you have to get up during the night. · Install sturdy handrails on stairways. · Move items in your cabinets so that the things you use a lot are on the lower shelves (about waist level). · Keep a cordless phone and a flashlight with new batteries by your bed. If possible, put a phone in each of the main rooms of your house, or carry a cell phone in case you fall and cannot reach a phone. Or, you can wear a device around your neck or wrist. You push a button that sends a signal for help. · Wear low-heeled shoes that fit well and give your feet good support. Use footwear with nonskid soles. Check the heels and soles of your shoes for wear. Repair or replace worn heels or soles. · Do not wear socks without shoes on wood floors. · Walk on the grass when the sidewalks are slippery. If you live in an area that gets snow and ice in the winter, sprinkle salt on slippery steps and sidewalks. Preventing falls in the bath · Install grab bars and nonskid mats inside and outside your shower or tub and near the toilet and sinks. · Use shower chairs and bath benches. · Use a hand-held shower head that will allow you to sit while showering. · Get into a tub or shower by putting the weaker leg in first. Get out of a tub or shower with your strong side first. 
· Repair loose toilet seats and consider installing a raised toilet seat to make getting on and off the toilet easier. · Keep your bathroom door unlocked while you are in the shower. Where can you learn more? Go to http://agnesToolWirenirmal.info/. Enter [id number] [id number] in the search box to learn more about \"Preventing Falls: Care Instructions. \" Current as of: May 12, 2017 Content Version: 11.4 © 8257-3400 [employer]. Care instructions adapted under license by [employer] (which disclaims liability or warranty for this information). If you have questions about a medical condition or this instruction, always ask your healthcare professional. [place] [address] any warranty or liability for your use of this information. Preventing Outdoor Falls: Care Instructions Your Care Instructions Worries about falls don't need to keep you indoors. Outdoor activities like walking have big benefits for your health. You will need to watch your step and learn a few safety measures.  
If you are worried about having a fall outdoors, ask your doctor about exercises, classes, or physical therapy that may help. You can learn ways to gain strength, flexibility, and balance. Ask if it might help to use a cane or walker. You can make your time outdoors safer with a few simple measures. Follow-up care is a key part of your treatment and safety. Be sure to make and go to all appointments, and call your doctor if you are having problems. It's also a good idea to know your test results and keep a list of the medicines you take. How can you prevent falls outdoors? · Wear shoes with firm soles and low heels. If you have to walk on an icy surface, use grippers that can be worn over your shoes in bad weather. · Be extra careful if weather is bad. Walk on the grass when the sidewalks are slick. If you live in a place that gets snow and ice in the winter, sprinkle salt on slippery stairs and sidewalks. · Be careful getting on or off buses and trains or getting in and out of cars. If handrails are available, use them. · Be careful when you cross the street. Look for crosswalks or places where curb cuts or ramps are present. · Try not to hurry, especially if you are carrying something. · Be cautious in parking lots or garages. There may be curbs or changes in pavement, or the height of the pavement may vary. · Make sure to wear the correct eyeglasses, if you need them. Reading glasses or bifocals can make it harder to see hazards that might be in your way. · If you are walking outdoors for exercise, try to: 
¨ Walk in well-lighted, well-maintained areas. These include high school or college tracks, shopping malls, and public spaces. ¨ Walk with a partner. ¨ Watch out for cracked sidewalks, curbs, changes in the height of the pavement, exposed tree roots, and debris such as fallen leaves or branches. Where can you learn more? Go to http://agnes-nirmal.info/.  
Enter C270 in the search box to learn more about \"Preventing Outdoor Falls: Care Instructions. \" Current as of: May 12, 2017 Content Version: 11.4 © 3551-5697 [employer]. Care instructions adapted under license by [employer] (which disclaims liability or warranty for this information). If you have questions about a medical condition or this instruction, always ask your healthcare professional. [address] any warranty or liability for your use of this information. How to Get Up Safely After a Fall: Care Instructions Your Care Instructions If you have injuries, health problems, or other reasons that may make it easy for you to fall at home, it is a good idea to learn how to get up safely after a fall. Learning how to get up correctly can help you avoid making an injury worse. Also, knowing what to do if you cannot get up can help you stay safe until help arrives. Follow-up care is a key part of your treatment and safety. Be sure to make and go to all appointments, and call your doctor if you are having problems. It's also a good idea to know your test results and keep a list of the medicines you take. How can you care for yourself after a fall? If you think you can get up First lie still for a few minutes and think about how you feel. If your body feels okay and you think you can get up safely, follow the rest of the steps below: 1. Look for a chair or other piece of furniture that is close to you. 2. Roll onto your side and rest. Roll by turning your head in the direction you want to roll, move your shoulder and arm, then hip and leg in the same direction. 3. Lie still for a moment to let your blood pressure adjust. 
4. Slowly push your upper body up, lift your head, and take a moment to rest. 
5. Slowly get up on your hands and knees, and crawl to the chair or other stable piece of furniture. 6. Put your hands on the chair. 7. Move one foot forward, and place it flat on the floor.  Your other leg should be bent with the knee on the floor. 8. Rise slowly, turn your body, and sit in the chair. Stay seated for a bit and think about how you feel. Call for help. Even if you feel okay, let someone know what happened to you. You might not know that you have a serious injury. If you cannot get up 1. If you think you are injured after a fall or you cannot get up, try not to panic. 2. Call out for help. 3. If you have a phone within reach or you have an emergency call device, use it to call for help. 4. If you do not have a phone within reach, try to slide yourself toward it. If you cannot get to the phone, try to slide toward a door or window or a place where you think you can be heard. 5. Gordon or use an object to make noise so someone might hear you. 6. If you can reach something that you can use for a pillow, place it under your head. Try to stay warm by covering yourself with a blanket or clothing while you wait for help. When should you call for help? Call 911 anytime you think you may need emergency care. For example, call if: 
? · You passed out (lost consciousness). ? · You cannot get up after a fall. ? · You have severe pain. ?Call your doctor now or seek immediate medical care if: 
? · You have new or worse pain. ? · You are dizzy or lightheaded. ? · You hit your head. ? Watch closely for changes in your health, and be sure to contact your doctor if: 
? · You do not get better as expected. Where can you learn more? Go to http://agnes-nirmal.info/. Enter H148 in the search box to learn more about \"How to Get Up Safely After a Fall: Care Instructions. \" Current as of: May 12, 2017 Content Version: 11.4 © 9495-1941 [employer]. Care instructions adapted under license by [employer] (which disclaims liability or warranty for this information).  If you have questions about a medical condition or this instruction, always ask your healthcare professional. [address] any warranty or liability for your use of this information. Advance Care Planning: Care Instructions Your Care Instructions It can be hard to live with an illness that cannot be cured. But if your health is getting worse, you may want to make decisions about end-of-life care. Planning for the end of your life does not mean that you are giving up. It is a way to make sure that your wishes are met. Clearly stating your wishes can make it easier for your loved ones. Making plans while you are still able may also ease your mind and make your final days less stressful and more meaningful. Follow-up care is a key part of your treatment and safety. Be sure to make and go to all appointments, and call your doctor if you are having problems. It's also a good idea to know your test results and keep a list of the medicines you take. What can you do to plan for the end of life? · You can bring these issues up with your doctor. You do not need to wait until your doctor starts the conversation. You might start with \"I would not be willing to live with . Car Girard \" When you complete this sentence it helps your doctor understand your wishes. · Talk openly and honestly with your doctor. This is the best way to understand the decisions you will need to make as your health changes. Know that you can always change your mind. · Ask your doctor about commonly used life-support measures. These include tube feedings, breathing machines, and fluids given through a vein (IV). Understanding these treatments will help you decide whether you want them. · You may choose to have these life-supporting treatments for a limited time. This allows a trial period to see whether they will help you. You may also decide that you want your doctor to take only certain measures to keep you alive.  It is important to spell out these conditions so that your doctor and family understand them. · Talk to your doctor about how long you are likely to live. He or she may be able to give you an idea of what usually happens with your specific illness. · Think about preparing papers that state your wishes. This way there will not be any confusion about what you want. You can change your instructions at any time. Which papers should you prepare? Advance directives are legal papers that tell doctors how you want to be cared for at the end of your life. You do not need a  to write these papers. Ask your doctor or your [facility] department for information on how to write your advance directives. They may have the forms for each of these types of papers. Make sure your doctor has a copy of these on file, and give a copy to a family member or close friend. · Consider a do-not-resuscitate order (DNR). This order asks that no extra treatments be done if your heart stops or you stop breathing. Extra treatments may include cardiopulmonary resuscitation (CPR), electrical shock to restart your heart, or a machine to breathe for you. If you decide to have a DNR order, ask your doctor to explain and write it. Place the order in your home where everyone can easily see it. · Consider a living will. A living will explains your wishes about life support and other treatments at the end of your life if you become unable to speak for yourself. Living vaelnzuela tell doctors to use or not use treatments that would keep you alive. You must have one or two witnesses or a notary present when you sign this form. · Consider a durable power of  for health care. This allows you to name a person to make decisions about your care if you are not able to. Most people ask a close friend or family member. Talk to this person about the kinds of treatments you want and those that you do not want. Make sure this person understands your wishes. These legal papers are simple to change. Tell your doctor what you want to change, and ask him or her to make a note in your medical file. Give your family updated copies of the papers. Where can you learn more? Go to http://agnes-nirmal.info/. Enter P184 in the search box to learn more about \"Advance Care Planning: Care Instructions. \" Current as of: September 24, 2016 Content Version: 11.4 © 7216-6263 [employer]. Care instructions adapted under license by [employer] (which disclaims liability or warranty for this information). If you have questions about a medical condition or this instruction, always ask your healthcare professional. [address] any warranty or liability for your use of this information. Learning About Durable Power of  for Health Care What is a durable power of  for health care? A durable power of  for health care is one type of the legal forms called advance directives. It lets you decide who you want to make treatment decisions for you if you cannot speak or decide for yourself. The person you choose is called your health care agent. Another type of advance directive is a living will. It lets you write down what kinds of treatment or life support you want or do not want. What should you think about when choosing a health care agent? Choose your health care agent carefully. This person may or may not be a family member. Talk to the person before you make your final decision. Make sure he or she is comfortable with this responsibility. It's a good idea to choose someone who: · Is at least 25years old. · Knows you well and understands what makes life meaningful for you. · Understands your [employer] and moral values. · Will do what you want, not what he or she wants. · Will be able to make difficult choices at a stressful time. · Will be able to refuse or stop treatment, if that is what you would want, even if you could die. · Will be firm and confident with health professionals if needed. · Will ask questions to get necessary information. · Lives near you or agrees to travel to you if needed. Your family may help you make medical decisions while you can still be part of that process. But it is important to choose one person to be your health care agent in case you are not able to make decisions for yourself. If you don't fill out the legal form and name a health care agent, the decisions your family can make may be limited. Who will make decisions for you if you do not have a health care agent? If you don't have a health care agent or a living will, your family members may disagree about your medical care. And then some medical professionals who may not know you as well might have to make decisions for you. In some cases, a  makes the decisions. When you name a health care agent, it is very clear who has the power to make health decisions for you. How do you name a health care agent? You name your health care agent on a legal form. It is usually called a durable power of  for health care. Ask your hospital, state bar association, or office on aging where to find these forms. You must sign the form to make it legal. Some states require you to get the form notarized. This means that a person called a  watches you sign the form and then he or she signs the form. Some states also require that two or more witnesses sign the form. Be sure to tell your family members and doctors who your health care agent is. Keep your forms in a safe place. But make sure that your loved ones know where the forms are. This could be in your desk where you keep other important papers. Make sure your doctor has a copy of your forms. Where can you learn more? Go to http://agnes-nirmal.info/. Enter [id number] in the search box to learn more about \"Learning About Durable Power of  for [facility]. \" Current as of: September 24, 2016 Content Version: 11.4 © 1612-5573 [employer]. Care instructions adapted under license by [employer] (which disclaims liability or warranty for this information). If you have questions about a medical condition or this instruction, always ask your healthcare professional. [facility] [address] any warranty or liability for your use of this information. Deciding About Life-Prolonging Treatment Deciding About Life-Prolonging Treatment What is life-prolonging treatment? There are many kinds of treatment that can help you live longer. These may be needed for only a short time until your illness improves. Or you may use them over the long term to help keep you alive. Some treatments include the use of: · Medicines to slow the progress of certain diseases, such as heart disease, diabetes, cancer, AIDS, or Alzheimer's disease. · Antibiotics to treat serious infections, such as pneumonia. · Dialysis to clean your blood if your kidneys stop working. · A breathing machine to help you breathe if you can't breathe on your own. This machine pumps air into your lungs through a tube put into your throat. · A feeding tube or an intravenous (IV) line to give you food and fluids if you can't eat or drink. · Cardiopulmonary resuscitation (CPR) to try to restart your heart. The decision to receive treatments that may help you live longer is a personal one. You may want your doctor to do everything possible to keep you alive, even when your chance for recovery is small. Or you may choose to only have care to manage your pain and other symptoms. What are key points about this decision? · If there is a good chance that your illness can be cured or managed, your doctor may advise you to first try available treatments.  If these don't work, then you might think about stopping treatment. · If you stop treatment, you will still receive care that focuses on pain relief and comfort. · A decision to stop treatment that keeps you alive does not have to be permanent. You can always change your mind if your health starts to improve. · Even though treatment focuses on helping you live longer, it may cause side effects that can greatly affect your quality of life. And it could affect how you spend time with your family and friends. · If you still have personal goals that you want to pursue, you may want treatment that keeps you alive long enough to reach them. Why might you choose life-prolonging treatment? · There is a good chance that your illness can be cured or managed. · You think you can manage the possible side effects of treatment. · You don't think treatment will get in the way of your quality of life. · You have personal goals that you still want to pursue and achieve. Why might you choose to stop life-prolonging treatment? · Your chance of surviving your illness is very low. · You have tried all possible treatments for your illness, but they have not helped. · You can no longer deal with the side effects of treatment. · You have already met the goals you set out to achieve in your life. Your decision Thinking about the facts and your feelings can help you make a decision that is right for you. Be sure you understand the benefits and risks of your options, and think about what else you need to do before you make the decision. Where can you learn more? Go to http://agnes-nirmal.info/. Enter N386 in the search box to learn more about \"Deciding About Life-Prolonging Treatment. \" Current as of: September 24, 2016 Content Version: 11.4 © 8056-2028 [employer].  Care instructions adapted under license by [employer] (which disclaims liability or warranty for this information). If you have questions about a medical condition or this instruction, always ask your healthcare professional. [place] [address] any warranty or liability for your use of this information. Jeremiah Noble 17[address] What is a living will? A living will is a legal form you use to write down the kind of care you want at the end of your life. It is used by the health professionals who will treat you if you aren't able to decide for yourself. If you put your wishes in writing, your loved ones and others will know what kind of care you want. They won't need to guess. This can ease your mind and be helpful to others. A living will is not the same as an estate or property will. An estate will explains what you want to happen with your money and property after you die. Is a living will a legal document? A living will is a legal document. Each state has its own laws about living valenzuela. If you move to another state, make sure that your living will is legal in the state where you now live. Or you might use a universal form that has been approved by many states. This kind of form can sometimes be completed and stored online. Your electronic copy will then be available wherever you have a connection to the Internet. In most cases, doctors will respect your wishes even if you have a form from a different state. · You don't need an  to complete a living will. But legal advice can be helpful if your state's laws are unclear, your health history is complicated, or your family can't agree on what should be in your living will. · You can change your living will at any time. Some people find that their wishes about end-of-life care change as their health changes. · In addition to making a living will, think about completing a medical power of  form.  This form lets you name the person you want to make end-of-life treatment decisions for you (your \"health care agent\") if you're not able to. Many hospitals and nursing homes will give you the forms you need to complete a living will and a medical power of . · Your living will is used only if you can't make or communicate decisions for yourself anymore. If you become able to make decisions again, you can accept or refuse any treatment, no matter what you wrote in your living will. · Your state may offer an online registry. This is a place where you can store your living will online so the doctors and nurses who need to treat you can find it right away. What should you think about when creating a living will? Talk about your end-of-life wishes with your family members and your doctor. Let them know what you want. That way the people making decisions for you won't be surprised by your choices. Think about these questions as you make your living will: · Do you know enough about life support methods that might be used? If not, talk to your doctor so you know what might be done if you can't breathe on your own, your heart stops, or you're unable to swallow. · What things would you still want to be able to do after you receive life-support methods? Would you want to be able to walk? To speak? To eat on your own? To live without the help of machines? · If you have a choice, where do you want to be cared for? In your home? At a hospital or nursing home? · Do you want certain Mandaen practices performed if you become very ill? · If you have a choice at the end of your life, where would you prefer to die? At home? In a hospital or nursing home? Somewhere else? · Would you prefer to be buried or cremated? · Do you want your organs to be donated after you die? What should you do with your living will? · Make sure that your family members and your health care agent have copies of your living will. · Give your doctor a copy of your living will to keep in your medical record. If you have more than one doctor, make sure that each one has a copy. · You may want to put a copy of your living will where it can be easily found. Where can you learn more? Go to http://agnes-nirmal.info/. Enter D347 in the search box to learn more about \"Learning About Living Angeli. \" Current as of: September 24, 2016 Content Version: 11.4 © 4207-1502 [employer]. Care instructions adapted under license by [employer] (which disclaims liability or warranty for this information). If you have questions about a medical condition or this instruction, always ask your healthcare professional. Estephania Llamas any warranty or liability for your use of this information. Advance Directives: Care Instructions Your Care Instructions An advance directive is a legal way to state your wishes at the end of your life. It tells your family and your doctor what to do if you can no longer say what you want. There are two main types of advance directives. You can change them any time that your wishes change. · A living will tells your family and your doctor your wishes about life support and other treatment. · A durable power of  for health care lets you name a person to make treatment decisions for you when you can't speak for yourself. This person is called a health care agent. If you do not have an advance directive, decisions about your medical care may be made by a doctor or a  who doesn't know you. It may help to think of an advance directive as a gift to the people who care for you. If you have one, they won't have to make tough decisions by themselves. Follow-up care is a key part of your treatment and safety.  Be sure to make and go to all appointments, and call your doctor if you are having problems. It's also a good idea to know your test results and keep a list of the medicines you take. How can you care for yourself at home? · Discuss your wishes with your loved ones and your doctor. This way, there are no surprises. · Many states have a unique form. Or you might use a universal form that has been approved by many states. This kind of form can sometimes be completed and stored online. Your electronic copy will then be available wherever you have a connection to the Internet. In most cases, doctors will respect your wishes even if you have a form from a different state. · You don't need a  to do an advance directive. But you may want to get legal advice. · Think about these questions when you prepare an advance directive: ¨ Who do you want to make decisions about your medical care if you are not able to? Many people choose a family member or close friend. ¨ Do you know enough about life support methods that might be used? If not, talk to your doctor so you understand. ¨ What are you most afraid of that might happen? You might be afraid of having pain, losing your independence, or being kept alive by machines. ¨ Where would you prefer to die? Choices include your home, a hospital, or a nursing home. ¨ Would you like to have information about hospice care to support you and your family? ¨ Do you want to donate organs when you die? ¨ Do you want certain Gnosticism practices performed before you die? If so, put your wishes in the advance directive. · Read your advance directive every year, and make changes as needed. When should you call for help? Be sure to contact your doctor if you have any questions. Where can you learn more? Go to http://agnes-nirmal.info/. Enter R264 in the search box to learn more about \"Advance Directives: Care Instructions. \" Current as of: September 24, 2016 Content Version: 11.4 © 7598-3816 Healthwise, [employer]. Care instructions adapted under license by [employer] (which disclaims liability or warranty for this information). If you have questions about a medical condition or this instruction, always ask your healthcare professional. [address] any warranty or liability for your use of this information. Body Mass Index: Care Instructions Your Care Instructions Body mass index (BMI) can help you see if your weight is raising your risk for health problems. It uses a formula to compare how much you weigh with how tall you are. · A BMI lower than 18.5 is considered underweight. · A BMI between 18.5 and 24.9 is considered healthy. · A BMI between 25 and 29.9 is considered overweight. A BMI of 30 or higher is considered obese. If your BMI is in the normal range, it means that you have a lower risk for weight-related health problems. If your BMI is in the overweight or obese range, you may be at increased risk for weight-related health problems, such as high blood pressure, heart disease, stroke, arthritis or joint pain, and diabetes. If your BMI is in the underweight range, you may be at increased risk for health problems such as fatigue, lower protection (immunity) against illness, muscle loss, bone loss, hair loss, and hormone problems. BMI is just one measure of your risk for weight-related health problems. You may be at higher risk for health problems if you are not active, you eat an unhealthy diet, or you drink too much alcohol or use tobacco products. Follow-up care is a key part of your treatment and safety. Be sure to make and go to all appointments, and call your doctor if you are having problems. It's also a good idea to know your test results and keep a list of the medicines you take. How can you care for yourself at home? · Practice healthy eating habits.  This includes eating plenty of fruits, vegetables, whole grains, lean protein, and low-fat dairy. · If your doctor recommends it, get more exercise. Walking is a good choice. Bit by bit, increase the amount you walk every day. Try for at least 30 minutes on most days of the week. · Do not smoke. Smoking can increase your risk for health problems. If you need help quitting, talk to your doctor about stop-smoking programs and medicines. These can increase your chances of quitting for good. · Limit alcohol to 2 drinks a day for men and 1 drink a day for women. Too much alcohol can cause health problems. If you have a BMI higher than 25 · Your doctor may do other tests to check your risk for weight-related health problems. This may include measuring the distance around your waist. A waist measurement of more than 40 inches in men or 35 inches in women can increase the risk of weight-related health problems. · Talk with your doctor about steps you can take to stay healthy or improve your health. You may need to make lifestyle changes to lose weight and stay healthy, such as changing your diet and getting regular exercise. If you have a BMI lower than 18.5 · Your doctor may do other tests to check your risk for health problems. · Talk with your doctor about steps you can take to stay healthy or improve your health. You may need to make lifestyle changes to gain or maintain weight and stay healthy, such as getting more healthy foods in your diet and doing exercises to build muscle. Where can you learn more? Go to http://agnes-nirmal.info/. Enter S176 in the search box to learn more about \"Body Mass Index: Care Instructions. \" Current as of: October 13, 2016 Content Version: 11.4 © 7294-3802 Healthwise[employer]. Care instructions adapted under license by [employer] (which disclaims liability or warranty for this information).  If you have questions about a medical condition or this instruction, always ask your healthcare professional. [address] any warranty or liability for your use of this information. Starting a Weight Loss Plan: Care Instructions Your Care Instructions If you are thinking about losing weight, it can be hard to know where to start. Your doctor can help you set up a weight loss plan that best meets your needs. You may want to take a class on nutrition or exercise, or join a weight loss support group. If you have questions about how to make changes to your eating or exercise habits, ask your doctor about seeing a registered dietitian or an exercise specialist. 
It can be a big challenge to lose weight. But you do not have to make huge changes at once. Make small changes, and stick with them. When those changes become habit, add a few more changes. If you do not think you are ready to make changes right now, try to pick a date in the future. Make an appointment to see your doctor to discuss whether the time is right for you to start a plan. Follow-up care is a key part of your treatment and safety. Be sure to make and go to all appointments, and call your doctor if you are having problems. It's also a good idea to know your test results and keep a list of the medicines you take. How can you care for yourself at home? · Set realistic goals. Many people expect to lose much more weight than is likely. A weight loss of 5% to 10% of your body weight may be enough to improve your health. · Get family and friends involved to provide support. Talk to them about why you are trying to lose weight, and ask them to help. They can help by participating in exercise and having meals with you, even if they may be eating something different. · Find what works best for you.  If you do not have time or do not like to cook, a program that offers meal replacement bars or shakes may be better for you. Or if you like to prepare meals, finding a plan that includes daily menus and recipes may be best. 
· Ask your doctor about other health professionals who can help you achieve your weight loss goals. ¨ A dietitian can help you make healthy changes in your diet. ¨ An exercise specialist or  can help you develop a safe and effective exercise program. 
¨ A counselor or psychiatrist can help you cope with issues such as depression, anxiety, or family problems that can make it hard to focus on weight loss. · Consider joining a support group for people who are trying to lose weight. Your doctor can suggest groups in your area. Where can you learn more? Go to http://agnesGeomagicnirmal.info/. Enter K923 in the search box to learn more about \"Starting a Weight Loss Plan: Care Instructions. \" Current as of: October 13, 2016 Content Version: 11.4 © 0921-6719 [employer]. Care instructions adapted under license by [employer] (which disclaims liability or warranty for this information). If you have questions about a medical condition or this instruction, always ask your healthcare professional. [address] any warranty or liability for your use of this information.

## 2018-08-29 NOTE — ACP (ADVANCE CARE PLANNING)
Advance Care Planning (ACP) Provider Note - Comprehensive Date of ACP Conversation: 08/29/18 Persons included in Conversation:  patient and family Length of ACP Conversation in minutes:  20 minutes Authorized Decision Maker (If the patient is incapable of making informed decisions): N/A The following General ACP was discussed with:patient and family - Importance of advance care planning, including choosing a healthcare agent to  communicate patient's healthcare decisions if patient lost the ability to make decisions, such as after a sudden illness or accident. - Understanding of the healthcare agent role was assessed and information provided. - Exploration of values, goals, and preferences if recovery is not expected, even with continued medical treatment in the event of: Imminent death and/or Severe permanent brain injury. \"In these circumstances, what matters most to you? \":  Care focused more on comfort or quality of life. \"What, if any, treatments would you want to avoid? \": Life Prolonging Measures - Opportunity offered to explore how cultural, Quaker, spiritual, or personal beliefs would affect decisions for future care. Review of Existing Advance Directive:  N/A For Serious or Chronic Illness: The following items were discussed with the patient who verbalized understanding: - Understanding of medical condition.   
 
- Understanding of CPR, goals and expected outcomes, benefits and burdens discussed. - Information on CPR success rates provided (e.g. for CPR in hospital, survival to d/c at two weeks is 22%, for chronically ill or elderly/frail survival is less than 3%); the patient was asked to communicate understanding of information in his/her own words. - Explored fears and concerns regarding CPR or possible outcomes Interventions Provided: 
Recommended completion of Advance Directive form after review of ACP materials and conversation with prospective healthcare agent. Recommended communicating the plan and making copies for the healthcare agent, personal physician, and others as appropriate. Recommended review of completed ACP document annually or upon change in health status. Summary: 
The patient was advised that: She may like to appoint a person as her medical decision maker in the event that she can no longer do so. She may appoint a secondary person also. She is encouraged to make decision at this time re: if she wants life prolonging measures in the event- 
   A) her death is imminent and medical treatment will not help her recover. B) her condition makes her unaware of herself or her surroundings and she cannot interact with others. Patient would like to appoint her daughter Piero Montesinos as her medical decision maker in the event that she can no longer do so. Phone number is [phone]. Her secondary person is her daughter Marcus Freeman. Phone number is [phone]. If her death is imminent and medical treatment will not help her recover, the patient does not want life prolonging measures. If her condition makes her unaware of self or surroundings and she cannot interact with others, the patient does not want life prolonging measures. Advance directive is scanned in the chart.

## 2018-09-06 ENCOUNTER — HOSPITAL ENCOUNTER (OUTPATIENT)
Dept: LAB | Age: 83
Discharge: HOME | End: 2018-09-06
Payer: MEDICARE

## 2018-09-06 DIAGNOSIS — E11.21 TYPE 2 DIABETES WITH NEPHROPATHY (HCC): ICD-10-CM

## 2018-09-06 DIAGNOSIS — M1A.0790 IDIOPATHIC CHRONIC GOUT OF FOOT WITHOUT TOPHUS, UNSPECIFIED LATERALITY: ICD-10-CM

## 2018-09-06 LAB
CHOLEST SERPL-MCNC: 118 MG/DL
EST. AVERAGE GLUCOSE BLD GHB EST-MCNC: 148 MG/DL
HBA1C MFR BLD: 6.8 % (ref 4.2–5.6)
HDLC SERPL-MCNC: 57 MG/DL (ref 40–60)
HDLC SERPL: 2.1 {RATIO} (ref 0–5)
LDLC SERPL CALC-MCNC: 37.6 MG/DL (ref 0–100)
LIPID PROFILE,FLP: NORMAL
TRIGL SERPL-MCNC: 117 MG/DL (ref ?–150)
TSH SERPL DL<=0.05 MIU/L-ACNC: 1.9 UIU/ML (ref 0.36–3.74)
URATE SERPL-MCNC: 8.2 MG/DL (ref 2.6–7.2)
VLDLC SERPL CALC-MCNC: 23.4 MG/DL

## 2018-09-06 PROCEDURE — 80061 LIPID PANEL: CPT | Performed by: FAMILY MEDICINE

## 2018-09-06 PROCEDURE — 84550 ASSAY OF BLOOD/URIC ACID: CPT | Performed by: FAMILY MEDICINE

## 2018-09-06 PROCEDURE — 82043 UR ALBUMIN QUANTITATIVE: CPT | Performed by: FAMILY MEDICINE

## 2018-09-06 PROCEDURE — 36415 COLL VENOUS BLD VENIPUNCTURE: CPT | Performed by: FAMILY MEDICINE

## 2018-09-06 PROCEDURE — 83036 HEMOGLOBIN GLYCOSYLATED A1C: CPT | Performed by: FAMILY MEDICINE

## 2018-09-06 PROCEDURE — 84443 ASSAY THYROID STIM HORMONE: CPT | Performed by: FAMILY MEDICINE

## 2018-09-07 LAB
CREAT UR-MCNC: <13 MG/DL (ref 30–125)
MICROALBUMIN UR-MCNC: 15.9 MG/DL (ref 0–3)
MICROALBUMIN/CREAT UR-RTO: ABNORMAL MG/G (ref 0–30)

## 2018-09-07 NOTE — PROGRESS NOTES
HgbA1c was better. The patient is advised to continue with diet and exercise. Urine test needs to be repeated at next OV. Other labs were stable.

## 2018-11-28 ENCOUNTER — OFFICE VISIT (OUTPATIENT)
Dept: FAMILY MEDICINE CLINIC | Age: 83
End: 2018-11-28

## 2018-11-28 VITALS
DIASTOLIC BLOOD PRESSURE: 62 MMHG | OXYGEN SATURATION: 99 % | SYSTOLIC BLOOD PRESSURE: 134 MMHG | BODY MASS INDEX: 27 KG/M2 | WEIGHT: 143 LBS | RESPIRATION RATE: 14 BRPM | HEART RATE: 56 BPM | TEMPERATURE: 97.7 F | HEIGHT: 61 IN

## 2018-11-28 DIAGNOSIS — E79.0 HYPERURICEMIA: ICD-10-CM

## 2018-11-28 DIAGNOSIS — I25.10 ASHD (ARTERIOSCLEROTIC HEART DISEASE): ICD-10-CM

## 2018-11-28 DIAGNOSIS — I10 ESSENTIAL HYPERTENSION: ICD-10-CM

## 2018-11-28 DIAGNOSIS — E11.21 TYPE 2 DIABETES WITH NEPHROPATHY (HCC): Primary | ICD-10-CM

## 2018-11-28 PROBLEM — M1A.0720 IDIOPATHIC CHRONIC GOUT OF LEFT FOOT WITHOUT TOPHUS: Status: ACTIVE | Noted: 2018-11-28

## 2018-11-28 RX ORDER — METOPROLOL TARTRATE 50 MG/1
25 TABLET ORAL 2 TIMES DAILY
Qty: 90 TAB | Refills: 3 | Status: SHIPPED | OUTPATIENT
Start: 2018-11-28 | End: 2019-12-07 | Stop reason: SDUPTHER

## 2018-11-28 NOTE — PROGRESS NOTES
SUBJECTIVE: 
Yolanda Greene is a 80y.o. year old female Chief Complaint Patient presents with  Hypertension  Diabetes  Chronic Kidney Disease History of Present Illness:  
 
Her BP has been good at other doctors' clinic and home. She does not c/o any hypotensive symptoms. Her ASHD is controlled. No angina. She has elevated uric acid and has history of gout. Her gout attacks are infrequent and she does not want to take allopurinol for hyperuricemia. She is following ADA diet for DM II. She is not checking her glucose. She is on simvastatin for lipids w/o problem. She has a history of vertigo and takes as needed Antivert. She has not been needing this for last several months. No Systemic, Cardiovascular or Respiratory symptoms. Past Medical History:  
Diagnosis Date  Arteriosclerotic heart disease (ASHD)  CKD (chronic kidney disease), stage III ([address] 6/23/2015  CVA (cerebral infarction)  Diabetes ([facility] [address]  Gout 9/23/13 Left Foot  Hypertension  Leg weakness, bilateral 3/18/13 History reviewed. No pertinent surgical history. Current Outpatient Medications Medication Sig  
 metoprolol tartrate (LOPRESSOR) 50 mg tablet Take 0.5 Tabs by mouth two (2) times a day.  glucose blood VI test strips (BLOOD GLUCOSE TEST) strip Check glucose once daily. Re: DM .00  
 simvastatin (ZOCOR) 5 mg tablet take 1 tablet by mouth at bedtime  Blood-Glucose Meter monitoring kit Check glucose once daily. Re: DM .00  clopidogrel (PLAVIX) 75 mg tab Take 1 Tab by mouth daily.  amLODIPine (NORVASC) 5 mg tablet Take 2 Tabs by mouth daily.  meclizine (ANTIVERT) 25 mg tablet Take 25 mg by mouth three (3) times daily.  Lancets misc Check glucose once daily. Re: DM .00  aspirin delayed-release 81 mg tablet Take 81 mg by mouth daily. No current facility-administered medications for this visit. Allergies Allergen Reactions  Valsartan Anaphylaxis Review of Systems:  
Constitutional: No fever, chills, night sweats, malaise, dizziness. Cardiovascular: No angina, palpitations, PND, orthopnea, lightheadedness, edema, claudication. Respiratory: No dyspnea, wheeze, pleurisy, hemoptysis, unusual cough or sputum. Gastrointestinal: no nausea/ vomiting, bowel habit change, pain, LI symptoms, melena, hematochezia, anorexia. Psychiatric: No agitation, confusion/disorientation, suicidal or homicidal ideation. OBJECTIVE: 
Physical Exam:  
Constitutional: General Appearance:  well developed, overweight, nontoxic, in no acute distress. Visit Vitals /62 (BP 1 Location: Right arm, BP Patient Position: Sitting) Pulse (!) 56 Temp 97.7 °F (36.5 °C) (Oral) Resp 14 Ht 5' 1\" (1.549 m) Wt 143 lb (64.9 kg) SpO2 99% BMI 27.02 kg/m² Pulmonary: Respiratory effort: normal; no dyspnea, no retractions, no accessory muscle use. Auscultation: normal & symmetrical air exchange; no rales, no rhonchi, no wheeze; no rubs Cardiovascular[de-identified] Palpation of Heart: PMI not displaced or enlarged, no thrills, no heaves. Auscultation of Heart: RRR; G I/VI systolic murmur, no rubs, no gallops. Neck: carotid arteries- normal volume & without bruit; no JVD. Extremities: no edema, no active varicosity. GI[de-identified] Normal bowel sounds. No masses; no tenderness; no rebound/rigidity; no CVA tenderness. No hepatosplenomegaly. Psychiatric: Oriented to time, place and person. Musculoskeletal:  
NC/AT. Neck-supple. Walking is slow. Feet/ ankle: no acute swelling, tenderness, heat. Lab Results Component Value Date/Time WBC 7.4 08/23/2017 10:41 AM  
 HGB 13.2 08/23/2017 10:41 AM  
 HCT 40.9 08/23/2017 10:41 AM  
 PLATELET 843 15/49/4627 10:41 AM  
 MCV 78.1 08/23/2017 10:41 AM  
 
Lab Results Component Value Date/Time  Sodium 142 02/28/2018 10:32 AM  
 Potassium 3.8 02/28/2018 10:32 AM  
 Chloride 106 02/28/2018 10:32 AM  
 CO2 25 02/28/2018 10:32 AM  
 Anion gap 11 02/28/2018 10:32 AM  
 Glucose 236 (H) 02/28/2018 10:32 AM  
 BUN 25 (H) 02/28/2018 10:32 AM  
 Creatinine 1.26 02/28/2018 10:32 AM  
 BUN/Creatinine ratio 20 02/28/2018 10:32 AM  
 GFR est AA 49 (L) 02/28/2018 10:32 AM  
 GFR est non-AA 40 (L) 02/28/2018 10:32 AM  
 Calcium 8.9 02/28/2018 10:32 AM  
 Bilirubin, total 0.6 08/23/2017 10:41 AM  
 AST (SGOT) 15 08/23/2017 10:41 AM  
 Alk. phosphatase 68 08/23/2017 10:41 AM  
 Protein, total 7.9 08/23/2017 10:41 AM  
 Albumin 3.4 02/28/2018 10:32 AM  
 Globulin 4.3 (H) 08/23/2017 10:41 AM  
 A-G Ratio 0.8 08/23/2017 10:41 AM  
 ALT (SGPT) 17 08/23/2017 10:41 AM  
 
Lab Results Component Value Date/Time TSH 1.90 09/06/2018 10:19 AM  
 
Lab Results Component Value Date/Time Cholesterol, total 118 09/06/2018 10:19 AM  
 HDL Cholesterol 57 09/06/2018 10:19 AM  
 LDL, calculated 37.6 09/06/2018 10:19 AM  
 VLDL, calculated 23.4 09/06/2018 10:19 AM  
 Triglyceride 117 09/06/2018 10:19 AM  
 CHOL/HDL Ratio 2.1 09/06/2018 10:19 AM  
 
Lab Results Component Value Date/Time Hemoglobin A1c 6.8 (H) 09/06/2018 10:19 AM  
 Hemoglobin A1c (POC) 6.7 05/30/2018 10:23 AM  
 
  
ASSESSMENT:  
 
1. Type 2 diabetes with nephropathy (HCC) 2. Essential hypertension 3. ASHD (arteriosclerotic heart disease) 4. Hyperuricemia PLAN:  
 
Pharmacologic Management: Medications reviewed with the patient. No change. Orders Placed This Encounter  metoprolol tartrate (LOPRESSOR) 50 mg tablet She is getting other BW (CBC and CMP) from oncology. Health maintenance: Advised need for yearly diabetic eye exam.  She has Rx for Shingrix. Low purine diet. Discussed DDx, follow-up & work-up. Discussed risk/benefit & side effect of treatment. Follow up visit as scheduled in 3 months, prn sooner. Low salt ADA diet & graduated excecise.   Recommend home BP and glucose monitoring. Call with readings PRN. Health risk from non adherence discussed. Patient/ daughter voiced understanding. Follow-up Disposition: 
Return in about 3 months (around 2/28/2019).  
 
Viviane Tom MD

## 2019-02-28 ENCOUNTER — OFFICE VISIT (OUTPATIENT)
Dept: FAMILY MEDICINE CLINIC | Age: 84
End: 2019-02-28

## 2019-02-28 ENCOUNTER — HOSPITAL ENCOUNTER (OUTPATIENT)
Dept: LAB | Age: 84
Discharge: HOME | End: 2019-02-28
Payer: MEDICARE

## 2019-02-28 VITALS
HEART RATE: 58 BPM | OXYGEN SATURATION: 98 % | DIASTOLIC BLOOD PRESSURE: 82 MMHG | RESPIRATION RATE: 18 BRPM | HEIGHT: 61 IN | SYSTOLIC BLOOD PRESSURE: 142 MMHG | BODY MASS INDEX: 27.19 KG/M2 | WEIGHT: 144 LBS | TEMPERATURE: 97.6 F

## 2019-02-28 DIAGNOSIS — I25.10 ASHD (ARTERIOSCLEROTIC HEART DISEASE): ICD-10-CM

## 2019-02-28 DIAGNOSIS — Z00.00 MEDICARE ANNUAL WELLNESS VISIT, SUBSEQUENT: Primary | ICD-10-CM

## 2019-02-28 DIAGNOSIS — Z71.89 ACP (ADVANCE CARE PLANNING): ICD-10-CM

## 2019-02-28 DIAGNOSIS — E78.5 HYPERLIPIDEMIA, UNSPECIFIED HYPERLIPIDEMIA TYPE: ICD-10-CM

## 2019-02-28 DIAGNOSIS — I10 ESSENTIAL HYPERTENSION: ICD-10-CM

## 2019-02-28 DIAGNOSIS — E11.21 TYPE 2 DIABETES WITH NEPHROPATHY (HCC): ICD-10-CM

## 2019-02-28 LAB
ALBUMIN SERPL-MCNC: 4 G/DL (ref 3.4–5)
ALBUMIN/GLOB SERPL: 1.2 {RATIO} (ref 0.8–1.7)
ALP SERPL-CCNC: 72 U/L (ref 45–117)
ALT SERPL-CCNC: 20 U/L (ref 13–56)
ANION GAP SERPL CALC-SCNC: 7 MMOL/L (ref 3–18)
AST SERPL-CCNC: 14 U/L (ref 15–37)
BILIRUB SERPL-MCNC: 0.4 MG/DL (ref 0.2–1)
BUN SERPL-MCNC: 26 MG/DL (ref 7–18)
BUN/CREAT SERPL: 21 (ref 12–20)
CALCIUM SERPL-MCNC: 9.5 MG/DL (ref 8.5–10.1)
CHLORIDE SERPL-SCNC: 105 MMOL/L (ref 100–108)
CO2 SERPL-SCNC: 28 MMOL/L (ref 21–32)
CREAT SERPL-MCNC: 1.23 MG/DL (ref 0.6–1.3)
EST. AVERAGE GLUCOSE BLD GHB EST-MCNC: 154 MG/DL
GLOBULIN SER CALC-MCNC: 3.3 G/DL (ref 2–4)
GLUCOSE SERPL-MCNC: 103 MG/DL (ref 74–99)
HBA1C MFR BLD: 7 % (ref 4.2–5.6)
POTASSIUM SERPL-SCNC: 4.8 MMOL/L (ref 3.5–5.5)
PROT SERPL-MCNC: 7.3 G/DL (ref 6.4–8.2)
SODIUM SERPL-SCNC: 140 MMOL/L (ref 136–145)

## 2019-02-28 PROCEDURE — 83036 HEMOGLOBIN GLYCOSYLATED A1C: CPT

## 2019-02-28 PROCEDURE — 80053 COMPREHEN METABOLIC PANEL: CPT

## 2019-02-28 RX ORDER — AMLODIPINE BESYLATE 5 MG/1
10 TABLET ORAL DAILY
Qty: 180 TAB | Refills: 1 | Status: SHIPPED | OUTPATIENT
Start: 2019-02-28 | End: 2019-09-11 | Stop reason: SDUPTHER

## 2019-02-28 NOTE — PATIENT INSTRUCTIONS
Schedule of Personalized Health Plan for [facility] (Provide Copy to Patient) 02/28/19 The best way to stay healthy is to live a healthy lifestyle. A healthy lifestyle includes regular exercise, eating a well-balanced diet, keeping a healthy weight and not smoking. Regular physical exams and screening tests are another important way to take care of yourself. Preventive exams provided by health care providers can find health problems early when treatment works best and can keep you from getting certain diseases or illnesses. Preventive services include exams, lab tests, screenings, shots, monitoring and information to help you take care of your own health. All people over 65 should have a pneumonia shot. Pneumonia shots are usually only needed once in a lifetime unless your doctor decides differently. All people over 65 should have a yearly flu shot. People over 65 are at medium to high risk for Hepatitis B. Three shots are needed for complete protection. In addition to your physical exam, some screening tests are recommended: 
 
 
Screening for Breast Cancer is recommended yearly with a mammogram. 
 
Screening for Cervical Cancer is recommended every two years (annually for certain risk factors, such as previous history of STD or abnormal PAP in past 7 years), with a Pelvic Exam with PAP 
 
 Here is a list of your current Health Maintenance items with a due date: 
Health Maintenance Topic Date Due  MEDICARE YEARLY EXAM  11/30/2018  HEMOGLOBIN A1C Q6M  03/06/2019  Shingrix Vaccine Age 50> (1 of 2) 03/14/2020 (Originally 5/25/1980)  GLAUCOMA SCREENING Q2Y  05/24/2019  
 EYE EXAM RETINAL OR DILATED  05/24/2019  
 FOOT EXAM Q1  08/29/2019  MICROALBUMIN Q1  09/06/2019  LIPID PANEL Q1  09/06/2019  
 DTaP/Tdap/Td series (2 - Tdap) 02/24/2027  Bone Densitometry (Dexa) Screening  Completed  Pneumococcal 65+ Low/Medium Risk  Completed  Influenza Age 5 to Adult  Completed Preventing Falls: Care Instructions Your Care Instructions Getting around your home safely can be a challenge if you have injuries or health problems that make it easy for you to fall. Loose rugs and furniture in walkways are among the dangers for many older people who have problems walking or who have poor eyesight. People who have conditions such as arthritis, osteoporosis, or dementia also have to be careful not to fall. You can make your home safer with a few simple measures. Follow-up care is a key part of your treatment and safety. Be sure to make and go to all appointments, and call your doctor if you are having problems. It's also a good idea to know your test results and keep a list of the medicines you take. How can you care for yourself at home? Taking care of yourself · You may get dizzy if you do not drink enough water. To prevent dehydration, drink plenty of fluids, enough so that your urine is light yellow or clear like water. Choose water and other caffeine-free clear liquids. If you have kidney, heart, or liver disease and have to limit fluids, talk with your doctor before you increase the amount of fluids you drink. · Exercise regularly to improve your strength, muscle tone, and balance. Walk if you can. Swimming may be a good choice if you cannot walk easily. · Have your vision and hearing checked each year or any time you notice a change. If you have trouble seeing and hearing, you might not be able to avoid objects and could lose your balance. · Know the side effects of the medicines you take. Ask your doctor or pharmacist whether the medicines you take can affect your balance. Sleeping pills or sedatives can affect your balance. · Limit the amount of alcohol you drink. Alcohol can impair your balance and other senses. · Ask your doctor whether calluses or corns on your feet need to be removed. If you wear loose-fitting shoes because of calluses or corns, you can lose your balance and fall. · Talk to your doctor if you have numbness in your feet. Preventing falls at home · Remove raised doorway thresholds, throw rugs, and clutter. Repair loose carpet or raised areas in the floor. · Move furniture and electrical cords to keep them out of walking paths. · Use nonskid floor wax, and wipe up spills right away, especially on ceramic tile floors. · If you use a walker or cane, put rubber tips on it. If you use crutches, clean the bottoms of them regularly with an abrasive pad, such as steel wool. · Keep your house well lit, especially [facility], and outside walkways. Use night-lights in areas such as hallways and bathrooms. Add extra light switches or use remote switches (such as switches that go on or off when you clap your hands) to make it easier to turn lights on if you have to get up during the night. · Install sturdy handrails on stairways. · Move items in your cabinets so that the things you use a lot are on the lower shelves (about waist level). · Keep a cordless phone and a flashlight with new batteries by your bed. If possible, put a phone in each of the main rooms of your house, or carry a cell phone in case you fall and cannot reach a phone. Or, you can wear a device around your neck or wrist. You push a button that sends a signal for help. · Wear low-heeled shoes that fit well and give your feet good support. Use footwear with nonskid soles. Check the heels and soles of your shoes for wear. Repair or replace worn heels or soles. · Do not wear socks without shoes on wood floors. · Walk on the grass when the sidewalks are slippery. If you live in an area that gets snow and ice in the winter, sprinkle salt on slippery steps and sidewalks. Preventing falls in the bath · Install grab bars and nonskid mats inside and outside your shower or tub and near the toilet and sinks. · Use shower chairs and bath benches. · Use a hand-held shower head that will allow you to sit while showering. · Get into a tub or shower by putting the weaker leg in first. Get out of a tub or shower with your strong side first. 
· Repair loose toilet seats and consider installing a raised toilet seat to make getting on and off the toilet easier. · Keep your bathroom door unlocked while you are in the shower. Where can you learn more? Go to http://agnesBioTimenirmal.info/. Enter [id number] [id number] in the search box to learn more about \"Preventing Falls: Care Instructions. \" Current as of: May 12, 2017 Content Version: 11.4 © 5963-1620 [employer]. Care instructions adapted under license by [employer] (which disclaims liability or warranty for this information). If you have questions about a medical condition or this instruction, always ask your healthcare professional. [place] [address] any warranty or liability for your use of this information. Preventing Outdoor Falls: Care Instructions Your Care Instructions Worries about falls don't need to keep you indoors. Outdoor activities like walking have big benefits for your health. You will need to watch your step and learn a few safety measures.  
If you are worried about having a fall outdoors, ask your doctor about exercises, classes, or physical therapy that may help. You can learn ways to gain strength, flexibility, and balance. Ask if it might help to use a cane or walker. You can make your time outdoors safer with a few simple measures. Follow-up care is a key part of your treatment and safety. Be sure to make and go to all appointments, and call your doctor if you are having problems. It's also a good idea to know your test results and keep a list of the medicines you take. How can you prevent falls outdoors? · Wear shoes with firm soles and low heels. If you have to walk on an icy surface, use grippers that can be worn over your shoes in bad weather. · Be extra careful if weather is bad. Walk on the grass when the sidewalks are slick. If you live in a place that gets snow and ice in the winter, sprinkle salt on slippery stairs and sidewalks. · Be careful getting on or off buses and trains or getting in and out of cars. If handrails are available, use them. · Be careful when you cross the street. Look for crosswalks or places where curb cuts or ramps are present. · Try not to hurry, especially if you are carrying something. · Be cautious in parking lots or garages. There may be curbs or changes in pavement, or the height of the pavement may vary. · Make sure to wear the correct eyeglasses, if you need them. Reading glasses or bifocals can make it harder to see hazards that might be in your way. · If you are walking outdoors for exercise, try to: 
¨ Walk in well-lighted, well-maintained areas. These include high school or college tracks, shopping malls, and public spaces. ¨ Walk with a partner. ¨ Watch out for cracked sidewalks, curbs, changes in the height of the pavement, exposed tree roots, and debris such as fallen leaves or branches. Where can you learn more? Go to http://agnes-nirmal.info/.  
Enter D477 in the search box to learn more about \"Preventing Outdoor Falls: Care Instructions. \" Current as of: May 12, 2017 Content Version: 11.4 © 9618-3196 [employer]. Care instructions adapted under license by [employer] (which disclaims liability or warranty for this information). If you have questions about a medical condition or this instruction, always ask your healthcare professional. [address] any warranty or liability for your use of this information. How to Get Up Safely After a Fall: Care Instructions Your Care Instructions If you have injuries, health problems, or other reasons that may make it easy for you to fall at home, it is a good idea to learn how to get up safely after a fall. Learning how to get up correctly can help you avoid making an injury worse. Also, knowing what to do if you cannot get up can help you stay safe until help arrives. Follow-up care is a key part of your treatment and safety. Be sure to make and go to all appointments, and call your doctor if you are having problems. It's also a good idea to know your test results and keep a list of the medicines you take. How can you care for yourself after a fall? If you think you can get up First lie still for a few minutes and think about how you feel. If your body feels okay and you think you can get up safely, follow the rest of the steps below: 1. Look for a chair or other piece of furniture that is close to you. 2. Roll onto your side and rest. Roll by turning your head in the direction you want to roll, move your shoulder and arm, then hip and leg in the same direction. 3. Lie still for a moment to let your blood pressure adjust. 
4. Slowly push your upper body up, lift your head, and take a moment to rest. 
5. Slowly get up on your hands and knees, and crawl to the chair or other stable piece of furniture. 6. Put your hands on the chair. 7. Move one foot forward, and place it flat on the floor.  Your other leg should be bent with the knee on the floor. 8. Rise slowly, turn your body, and sit in the chair. Stay seated for a bit and think about how you feel. Call for help. Even if you feel okay, let someone know what happened to you. You might not know that you have a serious injury. If you cannot get up 1. If you think you are injured after a fall or you cannot get up, try not to panic. 2. Call out for help. 3. If you have a phone within reach or you have an emergency call device, use it to call for help. 4. If you do not have a phone within reach, try to slide yourself toward it. If you cannot get to the phone, try to slide toward a door or window or a place where you think you can be heard. 5. [place] or use an object to make noise so someone might hear you. 6. If you can reach something that you can use for a pillow, place it under your head. Try to stay warm by covering yourself with a blanket or clothing while you wait for help. When should you call for help? Call 911 anytime you think you may need emergency care. For example, call if: 
? · You passed out (lost consciousness). ? · You cannot get up after a fall. ? · You have severe pain. ?Call your doctor now or seek immediate medical care if: 
? · You have new or worse pain. ? · You are dizzy or lightheaded. ? · You hit your head. ? Watch closely for changes in your health, and be sure to contact your doctor if: 
? · You do not get better as expected. Where can you learn more? Go to http://agnes-nirmal.info/. Enter S361 in the search box to learn more about \"How to Get Up Safely After a Fall: Care Instructions. \" Current as of: May 12, 2017 Content Version: 11.4 © 4571-6090 [employer]. Care instructions adapted under license by [employer] (which disclaims liability or warranty for this information).  If you have questions about a medical condition or this instruction, always ask your healthcare professional. [address] any warranty or liability for your use of this information. Advance Care Planning: Care Instructions Your Care Instructions It can be hard to live with an illness that cannot be cured. But if your health is getting worse, you may want to make decisions about end-of-life care. Planning for the end of your life does not mean that you are giving up. It is a way to make sure that your wishes are met. Clearly stating your wishes can make it easier for your loved ones. Making plans while you are still able may also ease your mind and make your final days less stressful and more meaningful. Follow-up care is a key part of your treatment and safety. Be sure to make and go to all appointments, and call your doctor if you are having problems. It's also a good idea to know your test results and keep a list of the medicines you take. What can you do to plan for the end of life? · You can bring these issues up with your doctor. You do not need to wait until your doctor starts the conversation. You might start with \"I would not be willing to live with . [place] \" When you complete this sentence it helps your doctor understand your wishes. · Talk openly and honestly with your doctor. This is the best way to understand the decisions you will need to make as your health changes. Know that you can always change your mind. · Ask your doctor about commonly used life-support measures. These include tube feedings, breathing machines, and fluids given through a vein (IV). Understanding these treatments will help you decide whether you want them. · You may choose to have these life-supporting treatments for a limited time. This allows a trial period to see whether they will help you. You may also decide that you want your doctor to take only certain measures to keep you alive.  It is important to spell out these conditions so that your doctor and family understand them. · Talk to your doctor about how long you are likely to live. He or she may be able to give you an idea of what usually happens with your specific illness. · Think about preparing papers that state your wishes. This way there will not be any confusion about what you want. You can change your instructions at any time. Which papers should you prepare? Advance directives are legal papers that tell doctors how you want to be cared for at the end of your life. You do not need a  to write these papers. Ask your doctor or your [facility] department for information on how to write your advance directives. They may have the forms for each of these types of papers. Make sure your doctor has a copy of these on file, and give a copy to a family member or close friend. · Consider a do-not-resuscitate order (DNR). This order asks that no extra treatments be done if your heart stops or you stop breathing. Extra treatments may include cardiopulmonary resuscitation (CPR), electrical shock to restart your heart, or a machine to breathe for you. If you decide to have a DNR order, ask your doctor to explain and write it. Place the order in your home where everyone can easily see it. · Consider a living will. A living will explains your wishes about life support and other treatments at the end of your life if you become unable to speak for yourself. Living valenzuela tell doctors to use or not use treatments that would keep you alive. You must have one or two witnesses or a notary present when you sign this form. · Consider a durable power of  for health care. This allows you to name a person to make decisions about your care if you are not able to. Most people ask a close friend or family member. Talk to this person about the kinds of treatments you want and those that you do not want. Make sure this person understands your wishes. These legal papers are simple to change. Tell your doctor what you want to change, and ask him or her to make a note in your medical file. Give your family updated copies of the papers. Where can you learn more? Go to http://agnes-nirmal.info/. Enter P184 in the search box to learn more about \"Advance Care Planning: Care Instructions. \" Current as of: September 24, 2016 Content Version: 11.4 © 2102-7691 [employer]. Care instructions adapted under license by [employer] (which disclaims liability or warranty for this information). If you have questions about a medical condition or this instruction, always ask your healthcare professional. [address] any warranty or liability for your use of this information. Learning About Durable Power of  for Health Care What is a durable power of  for health care? A durable power of  for health care is one type of the legal forms called advance directives. It lets you decide who you want to make treatment decisions for you if you cannot speak or decide for yourself. The person you choose is called your health care agent. Another type of advance directive is a living will. It lets you write down what kinds of treatment or life support you want or do not want. What should you think about when choosing a health care agent? Choose your health care agent carefully. This person may or may not be a family member. Talk to the person before you make your final decision. Make sure he or she is comfortable with this responsibility. It's a good idea to choose someone who: · Is at least 25years old. · Knows you well and understands what makes life meaningful for you. · Understands your [employer] and moral values. · Will do what you want, not what he or she wants. · Will be able to make difficult choices at a stressful time. · Will be able to refuse or stop treatment, if that is what you would want, even if you could die. · Will be firm and confident with health professionals if needed. · Will ask questions to get necessary information. · Lives near you or agrees to travel to you if needed. Your family may help you make medical decisions while you can still be part of that process. But it is important to choose one person to be your health care agent in case you are not able to make decisions for yourself. If you don't fill out the legal form and name a health care agent, the decisions your family can make may be limited. Who will make decisions for you if you do not have a health care agent? If you don't have a health care agent or a living will, your family members may disagree about your medical care. And then some medical professionals who may not know you as well might have to make decisions for you. In some cases, a  makes the decisions. When you name a health care agent, it is very clear who has the power to make health decisions for you. How do you name a health care agent? You name your health care agent on a legal form. It is usually called a durable power of  for health care. Ask your hospital, state bar association, or office on aging where to find these forms. You must sign the form to make it legal. Some states require you to get the form notarized. This means that a person called a  watches you sign the form and then he or she signs the form. Some states also require that two or more witnesses sign the form. Be sure to tell your family members and doctors who your health care agent is. Keep your forms in a safe place. But make sure that your loved ones know where the forms are. This could be in your desk where you keep other important papers. Make sure your doctor has a copy of your forms. Where can you learn more? Go to http://agnes-nirmal.info/. Enter [id number] in the search box to learn more about \"Learning About Durable Power of  for [facility]. \" Current as of: September 24, 2016 Content Version: 11.4 © 7558-2470 [employer]. Care instructions adapted under license by [employer] (which disclaims liability or warranty for this information). If you have questions about a medical condition or this instruction, always ask your healthcare professional. [address] any warranty or liability for your use of this information. Deciding About Life-Prolonging Treatment Deciding About Life-Prolonging Treatment What is life-prolonging treatment? There are many kinds of treatment that can help you live longer. These may be needed for only a short time until your illness improves. Or you may use them over the long term to help keep you alive. Some treatments include the use of: · Medicines to slow the progress of certain diseases, such as heart disease, diabetes, cancer, AIDS, or Alzheimer's disease. · Antibiotics to treat serious infections, such as pneumonia. · Dialysis to clean your blood if your kidneys stop working. · A breathing machine to help you breathe if you can't breathe on your own. This machine pumps air into your lungs through a tube put into your throat. · A feeding tube or an intravenous (IV) line to give you food and fluids if you can't eat or drink. · Cardiopulmonary resuscitation (CPR) to try to restart your heart. The decision to receive treatments that may help you live longer is a personal one. You may want your doctor to do everything possible to keep you alive, even when your chance for recovery is small. Or you may choose to only have care to manage your pain and other symptoms. What are key points about this decision? · If there is a good chance that your illness can be cured or managed, your doctor may advise you to first try available treatments.  If these don't work, then you might think about stopping treatment. · If you stop treatment, you will still receive care that focuses on pain relief and comfort. · A decision to stop treatment that keeps you alive does not have to be permanent. You can always change your mind if your health starts to improve. · Even though treatment focuses on helping you live longer, it may cause side effects that can greatly affect your quality of life. And it could affect how you spend time with your family and friends. · If you still have personal goals that you want to pursue, you may want treatment that keeps you alive long enough to reach them. Why might you choose life-prolonging treatment? · There is a good chance that your illness can be cured or managed. · You think you can manage the possible side effects of treatment. · You don't think treatment will get in the way of your quality of life. · You have personal goals that you still want to pursue and achieve. Why might you choose to stop life-prolonging treatment? · Your chance of surviving your illness is very low. · You have tried all possible treatments for your illness, but they have not helped. · You can no longer deal with the side effects of treatment. · You have already met the goals you set out to achieve in your life. Your decision Thinking about the facts and your feelings can help you make a decision that is right for you. Be sure you understand the benefits and risks of your options, and think about what else you need to do before you make the decision. Where can you learn more? Go to http://agnes-nirmal.info/. Enter U127 in the search box to learn more about \"Deciding About Life-Prolonging Treatment. \" Current as of: September 24, 2016 Content Version: 11.4 © 7417-1761 [employer].  Care instructions adapted under license by [employer] (which disclaims liability or warranty for this information). If you have questions about a medical condition or this instruction, always ask your healthcare professional. [place] [address] any warranty or liability for your use of this information. Jeremiah Noble 1721 What is a living will? A living will is a legal form you use to write down the kind of care you want at the end of your life. It is used by the health professionals who will treat you if you aren't able to decide for yourself. If you put your wishes in writing, your loved ones and others will know what kind of care you want. They won't need to guess. This can ease your mind and be helpful to others. A living will is not the same as an estate or property will. An estate will explains what you want to happen with your money and property after you die. Is a living will a legal document? A living will is a legal document. Each state has its own laws about living valenzuela. If you move to another state, make sure that your living will is legal in the state where you now live. Or you might use a universal form that has been approved by many states. This kind of form can sometimes be completed and stored online. Your electronic copy will then be available wherever you have a connection to the Internet. In most cases, doctors will respect your wishes even if you have a form from a different state. · You don't need an  to complete a living will. But legal advice can be helpful if your state's laws are unclear, your health history is complicated, or your family can't agree on what should be in your living will. · You can change your living will at any time. Some people find that their wishes about end-of-life care change as their health changes. · In addition to making a living will, think about completing a medical power of  form.  This form lets you name the person you want to make end-of-life treatment decisions for you (your \"health care agent\") if you're not able to. Many hospitals and nursing homes will give you the forms you need to complete a living will and a medical power of . · Your living will is used only if you can't make or communicate decisions for yourself anymore. If you become able to make decisions again, you can accept or refuse any treatment, no matter what you wrote in your living will. · Your state may offer an online registry. This is a place where you can store your living will online so the doctors and nurses who need to treat you can find it right away. What should you think about when creating a living will? Talk about your end-of-life wishes with your family members and your doctor. Let them know what you want. That way the people making decisions for you won't be surprised by your choices. Think about these questions as you make your living will: · Do you know enough about life support methods that might be used? If not, talk to your doctor so you know what might be done if you can't breathe on your own, your heart stops, or you're unable to swallow. · What things would you still want to be able to do after you receive life-support methods? Would you want to be able to walk? To speak? To eat on your own? To live without the help of machines? · If you have a choice, where do you want to be cared for? In your home? At a hospital or nursing home? · Do you want certain Congregation practices performed if you become very ill? · If you have a choice at the end of your life, where would you prefer to die? At home? In a hospital or nursing home? Somewhere else? · Would you prefer to be buried or cremated? · Do you want your organs to be donated after you die? What should you do with your living will? · Make sure that your family members and your health care agent have copies of your living will. · Give your doctor a copy of your living will to keep in your medical record. If you have more than one doctor, make sure that each one has a copy. · You may want to put a copy of your living will where it can be easily found. Where can you learn more? Go to http://agnes-nirmal.info/. Enter G365 in the search box to learn more about \"Learning About Living Justyn Hoffman. \" Current as of: September 24, 2016 Content Version: 11.4 © 0110-7334 [employer]. Care instructions adapted under license by [employer] (which disclaims liability or warranty for this information). If you have questions about a medical condition or this instruction, always ask your healthcare professional. [address] any warranty or liability for your use of this information. Advance Directives: Care Instructions Your Care Instructions An advance directive is a legal way to state your wishes at the end of your life. It tells your family and your doctor what to do if you can no longer say what you want. There are two main types of advance directives. You can change them any time that your wishes change. · A living will tells your family and your doctor your wishes about life support and other treatment. · A durable power of  for health care lets you name a person to make treatment decisions for you when you can't speak for yourself. This person is called a health care agent. If you do not have an advance directive, decisions about your medical care may be made by a doctor or a  who doesn't know you. It may help to think of an advance directive as a gift to the people who care for you. If you have one, they won't have to make tough decisions by themselves. Follow-up care is a key part of your treatment and safety.  Be sure to make and go to all appointments, and call your doctor if you are having problems. It's also a good idea to know your test results and keep a list of the medicines you take. How can you care for yourself at home? · Discuss your wishes with your loved ones and your doctor. This way, there are no surprises. · Many states have a unique form. Or you might use a universal form that has been approved by many states. This kind of form can sometimes be completed and stored online. Your electronic copy will then be available wherever you have a connection to the Internet. In most cases, doctors will respect your wishes even if you have a form from a different state. · You don't need a  to do an advance directive. But you may want to get legal advice. · Think about these questions when you prepare an advance directive: ¨ Who do you want to make decisions about your medical care if you are not able to? Many people choose a family member or close friend. ¨ Do you know enough about life support methods that might be used? If not, talk to your doctor so you understand. ¨ What are you most afraid of that might happen? You might be afraid of having pain, losing your independence, or being kept alive by machines. ¨ Where would you prefer to die? Choices include your home, a hospital, or a nursing home. ¨ Would you like to have information about hospice care to support you and your family? ¨ Do you want to donate organs when you die? ¨ Do you want certain Mandaen practices performed before you die? If so, put your wishes in the advance directive. · Read your advance directive every year, and make changes as needed. When should you call for help? Be sure to contact your doctor if you have any questions. Where can you learn more? Go to http://agnes-nirmal.info/. Enter R264 in the search box to learn more about \"Advance Directives: Care Instructions. \" Current as of: September 24, 2016 Content Version: 11.4 © 9764-6946 [employer]. Care instructions adapted under license by [employer] (which disclaims liability or warranty for this information). If you have questions about a medical condition or this instruction, always ask your healthcare professional. [address] any warranty or liability for your use of this information.

## 2019-02-28 NOTE — PROGRESS NOTES
SUBJECTIVE: 
Luigi Casey is a 80y.o. year old female Chief Complaint Patient presents with  Hypertension  Diabetes History of Present Illness:  
 
Her BP has been running good. Today the bottle of amlodipine is empty; but her daughter says that she is taking all her medications. She does not c/o any hypotensive symptoms. Her ASHD is controlled. No angina. She has elevated uric acid and has history of gout. Her gout attacks are infrequent and she does not want to take allopurinol for hyperuricemia. She is following ADA diet for DM II. She is not checking her glucose. She is on simvastatin for lipids w/o problem. She has a history of vertigo and takes as needed Antivert. She has not been needing this for last several months. No Systemic, Cardiovascular or Respiratory symptoms. Past Medical History:  
Diagnosis Date  Arteriosclerotic heart disease (ASHD)  CKD (chronic kidney disease), stage III ([facility] [address] 6/23/2015  CVA (cerebral infarction)  Diabetes ([facility] [address]  Gout 9/23/13 Left Foot  Hypertension  Leg weakness, bilateral 3/18/13 History reviewed. No pertinent surgical history. Current Outpatient Medications Medication Sig  
 metoprolol tartrate (LOPRESSOR) 50 mg tablet Take 0.5 Tabs by mouth two (2) times a day.  glucose blood VI test strips (BLOOD GLUCOSE TEST) strip Check glucose once daily. Re: DM .00  
 simvastatin (ZOCOR) 5 mg tablet take 1 tablet by mouth at bedtime  Blood-Glucose Meter monitoring kit Check glucose once daily. Re: DM .00  clopidogrel (PLAVIX) 75 mg tab Take 1 Tab by mouth daily.  meclizine (ANTIVERT) 25 mg tablet Take 25 mg by mouth three (3) times daily.  Lancets misc Check glucose once daily. Re: DM .00  aspirin delayed-release 81 mg tablet Take 81 mg by mouth daily.  amLODIPine (NORVASC) 5 mg tablet Take 2 Tabs by mouth daily. No current facility-administered medications for this visit. Allergies Allergen Reactions  Valsartan Anaphylaxis Review of Systems:  
Constitutional: No fever, chills, night sweats, malaise, dizziness. Cardiovascular: No angina, palpitations, PND, orthopnea, lightheadedness, edema, claudication. Respiratory: No dyspnea, wheeze, pleurisy, hemoptysis, unusual cough or sputum. Gastrointestinal: no nausea/ vomiting, bowel habit change, pain, LI symptoms, melena, hematochezia, anorexia. Psychiatric: No agitation, confusion/disorientation, suicidal or homicidal ideation. OBJECTIVE: 
Physical Exam:  
Constitutional: General Appearance:  well developed, overweight, nontoxic, in no acute distress. Visit Vitals /82 (BP 1 Location: Right arm, BP Patient Position: Sitting) Pulse (!) 58 Temp 97.6 °F (36.4 °C) (Oral) Resp 18 Ht 5' 1\" (1.549 m) Wt 144 lb (65.3 kg) SpO2 98% BMI 27.21 kg/m² Pulmonary: Respiratory effort: normal; no dyspnea, no retractions, no accessory muscle use. Auscultation: normal & symmetrical air exchange; no rales, no rhonchi, no wheeze; no rubs Cardiovascular[de-identified] Palpation of Heart: PMI not displaced or enlarged, no thrills, no heaves. Auscultation of Heart: RRR; G I/VI systolic murmur, no rubs, no gallops. Neck: carotid arteries- normal volume & without bruit; no JVD. Extremities: no edema, no active varicosity. GI[de-identified] Normal bowel sounds. No masses; no tenderness; no rebound/rigidity; no CVA tenderness. No hepatosplenomegaly. Psychiatric: Oriented to time, place and person. Musculoskeletal:  
NC/AT. Neck-supple. Walking is slow. Feet/ ankle: no acute swelling, tenderness, heat. Lab Results Component Value Date/Time WBC 7.4 08/23/2017 10:41 AM  
 HGB 13.2 08/23/2017 10:41 AM  
 HCT 40.9 08/23/2017 10:41 AM  
 PLATELET 633 93/61/8009 10:41 AM  
 MCV 78.1 08/23/2017 10:41 AM  
 
Lab Results Component Value Date/Time Sodium 142 02/28/2018 10:32 AM  
 Potassium 3.8 02/28/2018 10:32 AM  
 Chloride 106 02/28/2018 10:32 AM  
 CO2 25 02/28/2018 10:32 AM  
 Anion gap 11 02/28/2018 10:32 AM  
 Glucose 236 (H) 02/28/2018 10:32 AM  
 BUN 25 (H) 02/28/2018 10:32 AM  
 Creatinine 1.26 02/28/2018 10:32 AM  
 BUN/Creatinine ratio 20 02/28/2018 10:32 AM  
 GFR est AA 49 (L) 02/28/2018 10:32 AM  
 GFR est non-AA 40 (L) 02/28/2018 10:32 AM  
 Calcium 8.9 02/28/2018 10:32 AM  
 Bilirubin, total 0.6 08/23/2017 10:41 AM  
 AST (SGOT) 15 08/23/2017 10:41 AM  
 Alk. phosphatase 68 08/23/2017 10:41 AM  
 Protein, total 7.9 08/23/2017 10:41 AM  
 Albumin 3.4 02/28/2018 10:32 AM  
 Globulin 4.3 (H) 08/23/2017 10:41 AM  
 A-G Ratio 0.8 08/23/2017 10:41 AM  
 ALT (SGPT) 17 08/23/2017 10:41 AM  
 
Lab Results Component Value Date/Time TSH 1.90 09/06/2018 10:19 AM  
 
Lab Results Component Value Date/Time Cholesterol, total 118 09/06/2018 10:19 AM  
 HDL Cholesterol 57 09/06/2018 10:19 AM  
 LDL, calculated 37.6 09/06/2018 10:19 AM  
 VLDL, calculated 23.4 09/06/2018 10:19 AM  
 Triglyceride 117 09/06/2018 10:19 AM  
 CHOL/HDL Ratio 2.1 09/06/2018 10:19 AM  
 
Lab Results Component Value Date/Time Hemoglobin A1c 6.8 (H) 09/06/2018 10:19 AM  
 Hemoglobin A1c (POC) 6.7 05/30/2018 10:23 AM  
 
  
ASSESSMENT:  
 
1. Medicare annual wellness visit, subsequent 2. Type 2 diabetes with nephropathy (HCC) 3. Essential hypertension 4. ASHD (arteriosclerotic heart disease) 5. Hyperlipidemia, unspecified hyperlipidemia type 6. ACP (advance care planning) PLAN:  
 
Pharmacologic Management: Medications reviewed with the patient. Take medications regularly. Orders Placed This Encounter  METABOLIC PANEL, COMPREHENSIVE  
 HEMOGLOBIN A1C WITH EAG  
 amLODIPine (NORVASC) 5 mg tablet She is getting blood work from oncology also. Health maintenance: She has Rx for Shingrix. Low purine diet. Discussed DDx, follow-up & work-up. Discussed risk/benefit & side effect of treatment. Follow up visit as scheduled in 3 months, prn sooner. Low salt ADA diet & graduated excecise. Recommend home BP and glucose monitoring. Call with readings PRN. Health risk from non adherence discussed. Patient/ daughter voiced understanding. Follow-up Disposition: 
Return in about 3 months (around 5/28/2019).  
 
Brendan Guerrero MD

## 2019-02-28 NOTE — PROGRESS NOTES
Marleen Gill is a 80 y.o. female and presents for annual Medicare Wellness Visit. Problem List: Reviewed with patient and discussed risk factors. Patient Active Problem List  
Diagnosis Code  Gout M10.9  Leg weakness, bilateral R29.898  Hypertension I10  
 CKD (chronic kidney disease), stage III (HCC) N18.3  ASHD (arteriosclerotic heart disease) I25.10  Heart murmur R01.1  Eczema L30.9  BPV (benign positional vertigo) H81.10  Type 2 diabetes with nephropathy (HCC) E11.21  
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 
Von Iyer MD as PCP - General (Family Practice) Drenda Habermann, MD as Physician (Oncology) OLIVER Moreno MD as Physician (Cardiology) PMH: Reviewed with patient Past Medical History:  
Diagnosis Date  Arteriosclerotic heart disease (ASHD)  CKD (chronic kidney disease), stage III ([facility] [address] 2015  CVA (cerebral infarction)  Diabetes ([facility] [address]  Gout 13 Left Foot  Hypertension  Leg weakness, bilateral 3/18/13 PSH: Reviewed with patient History reviewed. No pertinent surgical history. SH: Reviewed with patient Social History Tobacco Use  Smoking status: Former Smoker Last attempt to quit: 2005 Years since quittin.1  Smokeless tobacco: Never Used  Tobacco comment: does not remember how many years for smoking/packs Substance Use Topics  Alcohol use: No  
  Alcohol/week: 0.0 oz  Drug use: No  
 
 
FH: Reviewed with patient History reviewed. No pertinent family history. Medications/Allergies: Reviewed with patient Current Outpatient Medications on File Prior to Visit Medication Sig Dispense Refill  metoprolol tartrate (LOPRESSOR) 50 mg tablet Take 0.5 Tabs by mouth two (2) times a day.  90 Tab 3  
  glucose blood VI test strips (BLOOD GLUCOSE TEST) strip Check glucose once daily. Re: DM .00 100 Strip 1  simvastatin (ZOCOR) 5 mg tablet take 1 tablet by mouth at bedtime 90 Tab 3  Blood-Glucose Meter monitoring kit Check glucose once daily. Re: DM .00 1 Kit 0  
 clopidogrel (PLAVIX) 75 mg tab Take 1 Tab by mouth daily. 90 Tab 3  
 meclizine (ANTIVERT) 25 mg tablet Take 25 mg by mouth three (3) times daily.  Lancets misc Check glucose once daily. Re: DM .00 1 Each 11  
 aspirin delayed-release 81 mg tablet Take 81 mg by mouth daily.  [DISCONTINUED] amLODIPine (NORVASC) 5 mg tablet Take 2 Tabs by mouth daily. 180 Tab 3 No current facility-administered medications on file prior to visit. Allergies Allergen Reactions  Valsartan Anaphylaxis Objective: 
Visit Vitals /82 (BP 1 Location: Right arm, BP Patient Position: Sitting) Pulse (!) 58 Temp 97.6 °F (36.4 °C) (Oral) Resp 18 Ht 5' 1\" (1.549 m) Wt 144 lb (65.3 kg) SpO2 98% BMI 27.21 kg/m² Body mass index is 27.21 kg/m². Assessment of cognitive impairment:  
Alert and oriented x 3 Oriented to situation? yes Memory Problem? No, ? Cognition Problem? no 
 
Depression Screen:  
3 most recent PHQ Screens 2/28/2019 Little interest or pleasure in doing things Not at all Feeling down, depressed, irritable, or hopeless Not at all Total Score PHQ 2 0 Abuse Screen:  
Abuse Screening Questionnaire 2/28/2019 Do you ever feel afraid of your partner? Humera Wynn Are you in a relationship with someone who physically or mentally threatens you? Humera Wynn Is it safe for you to go home? Jaquan Burgos Fall Risk Assessment:   
Fall Risk Assessment, last 12 mths 2/28/2019 Able to walk? Yes Fall in past 12 months? No  
 
 
Functional Ability:  
Does the patient exhibit a steady gait? No- chronic slow gait How long did it take the patient to get up and walk from a sitting position? 2 sec Is the patient self reliant?  (ie can do own laundry, meals, household chores)  yes Does the patient handle his/her own medications?  no 
  
Does the patient handle his/her own money? yes Is the patients home safe (ie good lighting, handrails on stairs and bath, etc.)? yes Did you notice or did patient express any hearing difficulties? no 
  
Did you notice of did patient express any vision difficulties?   no 
  
Were distance and reading eye charts used? yes Advance Care Planning:  
Patient was offered the opportunity to discuss advance care planning:  yes Does patient have an Advance Directive:  no If no, did you provide information on Caring Connections? yes Plan:   
 
Orders Placed This Encounter  METABOLIC PANEL, COMPREHENSIVE  
 HEMOGLOBIN A1C WITH EAG  
 amLODIPine (NORVASC) 5 mg tablet Health Maintenance Topic Date Due  MEDICARE YEARLY EXAM  11/30/2018  HEMOGLOBIN A1C Q6M  03/06/2019  Shingrix Vaccine Age 50> (1 of 2) 03/14/2020 (Originally 5/25/1980)  GLAUCOMA SCREENING Q2Y  05/24/2019  
 EYE EXAM RETINAL OR DILATED  05/24/2019  
 FOOT EXAM Q1  08/29/2019  MICROALBUMIN Q1  09/06/2019  LIPID PANEL Q1  09/06/2019  
 DTaP/Tdap/Td series (2 - Tdap) 02/24/2027  Bone Densitometry (Dexa) Screening  Completed  Pneumococcal 65+ Low/Medium Risk  Completed  Influenza Age 5 to Adult  Completed *Patient verbalized understanding and agreement with the plan. A copy of the After Visit Summary with personalized health plan was given to the patient today.

## 2019-02-28 NOTE — PROGRESS NOTES
Diane Guerrero is a 80 y.o. female presents to office for htn, dm and CKD Medication list has been reviewed with Diane Guerrero and updated as of today's date Health Maintenance items with a due date reviewed with patient: 
Health Maintenance Due Topic Date Due  MEDICARE YEARLY EXAM  11/30/2018  HEMOGLOBIN A1C Q6M  03/06/2019

## 2019-02-28 NOTE — ACP (ADVANCE CARE PLANNING)
Advance Care Planning (ACP) Provider Note - Comprehensive Date of ACP Conversation: 02/28/19 Persons included in Conversation:  patient and family/daughter Length of ACP Conversation in minutes:  20 minutes Authorized Decision Maker (If the patient is incapable of making informed decisions): N/A The following General ACP was discussed with:patient and family daughter - Importance of advance care planning, including choosing a healthcare agent to  communicate patient's healthcare decisions if patient lost the ability to make decisions, such as after a sudden illness or accident. - Understanding of the healthcare agent role was assessed and information provided. - Exploration of values, goals, and preferences if recovery is not expected, even with continued medical treatment in the event of: Imminent death and/or Severe permanent brain injury. \"In these circumstances, what matters most to you? \":  The patient has not decided between quality of life versus life prolonging measures. She will decide. \"What, if any, treatments would you want to avoid? \":  The patient has not decided. Discussed with the patient. - Opportunity offered to explore how cultural, Church, spiritual, or personal beliefs would affect decisions for future care. Review of Existing Advance Directive:not applicable New form given to patient. For Serious or Chronic Illness: The following items were discussed with the patient who verbalized understanding: - Understanding of medical condition.   
 
- Understanding of CPR, goals and expected outcomes, benefits and burdens discussed. - Information on CPR success rates provided (e.g. for CPR in hospital, survival to d/c at two weeks is 22%, for chronically ill or elderly/frail survival is less than 3%); the patient was asked to communicate understanding of information in his/her own words. - Explored fears and concerns regarding CPR or possible outcomes Interventions Provided: 
Recommended completion of Advance Directive form after review of ACP materials and conversation with prospective healthcare agent. Recommended communicating the plan and making copies for the healthcare agent, personal physician, and others as appropriate. Recommended review of completed ACP document annually or upon change in health status. Recommended that if she needs any assistance we are available. Summary: 
The patient was advised that: She may like to appoint a person as her medical decision maker in the event that she can no longer do so. She may appoint a secondary person also. She is encouraged to make decision at this time re: if she wants life prolonging measures in the event- 
   A) her death is imminent and medical treatment will not help her recover. B) her condition makes her unaware of herself or her surroundings and she cannot interact with others. Patient likely will have her daughters as her agents, if she cannot speak for herself. She will decide. If her death is imminent and medical treatment will not help her recover, the patient has not decided about life prolonging measure in the situation. If her condition makes her unaware of self or surroundings and she cannot interact with others, the patient has not decided about life prolonging measure in the situation. She will decide. She is encouraged bring her advance directive to the next visit.

## 2019-05-29 ENCOUNTER — HOSPITAL ENCOUNTER (OUTPATIENT)
Dept: LAB | Age: 84
Discharge: HOME | End: 2019-05-29
Payer: MEDICARE

## 2019-05-29 ENCOUNTER — OFFICE VISIT (OUTPATIENT)
Dept: FAMILY MEDICINE CLINIC | Age: 84
End: 2019-05-29

## 2019-05-29 VITALS
BODY MASS INDEX: 27.64 KG/M2 | HEART RATE: 53 BPM | RESPIRATION RATE: 16 BRPM | SYSTOLIC BLOOD PRESSURE: 168 MMHG | WEIGHT: 146.4 LBS | HEIGHT: 61 IN | TEMPERATURE: 96.7 F | OXYGEN SATURATION: 100 % | DIASTOLIC BLOOD PRESSURE: 68 MMHG

## 2019-05-29 DIAGNOSIS — E79.0 HYPERURICEMIA: ICD-10-CM

## 2019-05-29 DIAGNOSIS — E11.21 TYPE 2 DIABETES WITH NEPHROPATHY (HCC): ICD-10-CM

## 2019-05-29 DIAGNOSIS — I25.10 ASHD (ARTERIOSCLEROTIC HEART DISEASE): ICD-10-CM

## 2019-05-29 DIAGNOSIS — I10 ESSENTIAL HYPERTENSION: Primary | ICD-10-CM

## 2019-05-29 DIAGNOSIS — M1A.0790 IDIOPATHIC CHRONIC GOUT OF FOOT WITHOUT TOPHUS, UNSPECIFIED LATERALITY: ICD-10-CM

## 2019-05-29 LAB
EST. AVERAGE GLUCOSE BLD GHB EST-MCNC: 166 MG/DL
GLUCOSE SERPL-MCNC: 108 MG/DL (ref 74–99)
HBA1C MFR BLD: 7.4 % (ref 4.2–5.6)
URATE SERPL-MCNC: 8.6 MG/DL (ref 2.6–7.2)

## 2019-05-29 PROCEDURE — 84550 ASSAY OF BLOOD/URIC ACID: CPT

## 2019-05-29 PROCEDURE — 36415 COLL VENOUS BLD VENIPUNCTURE: CPT

## 2019-05-29 PROCEDURE — 82947 ASSAY GLUCOSE BLOOD QUANT: CPT

## 2019-05-29 PROCEDURE — 83036 HEMOGLOBIN GLYCOSYLATED A1C: CPT

## 2019-05-29 NOTE — PROGRESS NOTES
SUBJECTIVE:  Katy Guerin is a 80y.o. year old female   Chief Complaint   Patient presents with    Diabetes     f/u    Hypertension     f/u    Cholesterol Problem     f/u       History of Present Illness:   Went to ED for Lt foot gout on 5/16/18. The foot is better. She has history of recurrent gout. She has elevated uric acid. Her gout attacks are infrequent and she did not want to take allopurinol for hyperuricemia. Her BP has been running good. It is elevated toady because she has not taken her medication. However, it was good(127/63) in ED on 5/16/19. She does not c/o any hypotensive symptoms. Her ASHD is controlled. No angina. She is following ADA diet for DM II. She is not checking her glucose. She is on simvastatin for lipids w/o problem. She has a history of vertigo and takes as needed Antivert. She has not been needing this for last several months. No Systemic, Cardiovascular or Respiratory symptoms. Past Medical History:   Diagnosis Date    Arteriosclerotic heart disease (ASHD)     CKD (chronic kidney disease), stage III ([address] 6/23/2015    CVA (cerebral infarction)     Diabetes ([facility] [address]     Gout 9/23/13    Left Foot    Hypertension     Leg weakness, bilateral 3/18/13     History reviewed. No pertinent surgical history. Current Outpatient Medications   Medication Sig    clopidogrel (PLAVIX) 75 mg tab take 1 tablet by mouth once daily    amLODIPine (NORVASC) 5 mg tablet Take 2 Tabs by mouth daily.  metoprolol tartrate (LOPRESSOR) 50 mg tablet Take 0.5 Tabs by mouth two (2) times a day.  glucose blood VI test strips (BLOOD GLUCOSE TEST) strip Check glucose once daily. Re: DM .00    simvastatin (ZOCOR) 5 mg tablet take 1 tablet by mouth at bedtime    Blood-Glucose Meter monitoring kit Check glucose once daily. Re: DM .00    Lancets misc Check glucose once daily.   Re: DM .00    aspirin delayed-release 81 mg tablet Take 81 mg by mouth daily.    meclizine (ANTIVERT) 25 mg tablet Take 25 mg by mouth three (3) times daily. No current facility-administered medications for this visit. Allergies   Allergen Reactions    Valsartan Anaphylaxis        Review of Systems:   Constitutional: No fever, chills, night sweats, malaise, dizziness. Cardiovascular: No angina, palpitations, PND, orthopnea, lightheadedness, edema, claudication. Respiratory: No dyspnea, wheeze, pleurisy, hemoptysis, unusual cough or sputum. Gastrointestinal: no nausea/ vomiting, bowel habit change, pain, LI symptoms, melena, hematochezia, anorexia. Psychiatric: No agitation, confusion/disorientation, suicidal or homicidal ideation. Musculoskeletal: Lt foot gout as above. OBJECTIVE:  Physical Exam:   Constitutional: General Appearance:  well developed, overweight, nontoxic, in no acute distress. Visit Vitals  /68 (BP 1 Location: Right arm, BP Patient Position: Sitting)   Pulse (!) 53   Temp 96.7 °F (35.9 °C) (Oral)   Resp 16   Ht 5' 1\" (1.549 m)   Wt 146 lb 6.4 oz (66.4 kg)   SpO2 100%   BMI 27.66 kg/m²   BP: 144/78. Pulmonary: Respiratory effort: normal; no dyspnea, no retractions, no accessory muscle use. Auscultation: normal & symmetrical air exchange; no rales, no rhonchi, no wheeze; no rubs    Cardiovascular[de-identified] Palpation of Heart: PMI not displaced or enlarged, no thrills, no heaves. Auscultation of Heart: RRR; G I/VI systolic murmur, no rubs, no gallops. Neck: carotid arteries- normal volume & without bruit; no JVD. Extremities: no edema, no active varicosity. GI[de-identified] Normal bowel sounds. No masses; no tenderness; no rebound/rigidity; no CVA tenderness. No hepatosplenomegaly. Psychiatric: Oriented to time, place and person. Musculoskeletal:   NC/AT. Neck-supple. Walking is slow. Feet/ ankle: no acute swelling, tenderness, heat.     Lab Results   Component Value Date/Time    WBC 7.4 08/23/2017 10:41 AM    HGB 13.2 08/23/2017 10:41 AM    HCT 40.9 08/23/2017 10:41 AM    PLATELET 391 97/36/6516 10:41 AM    MCV 78.1 08/23/2017 10:41 AM   CBC by oncology 12/10/2018. Lab Results   Component Value Date/Time    Sodium 140 02/28/2019 12:46 PM    Potassium 4.8 02/28/2019 12:46 PM    Chloride 105 02/28/2019 12:46 PM    CO2 28 02/28/2019 12:46 PM    Anion gap 7 02/28/2019 12:46 PM    Glucose 103 (H) 02/28/2019 12:46 PM    BUN 26 (H) 02/28/2019 12:46 PM    Creatinine 1.23 02/28/2019 12:46 PM    BUN/Creatinine ratio 21 (H) 02/28/2019 12:46 PM    GFR est AA 50 (L) 02/28/2019 12:46 PM    GFR est non-AA 41 (L) 02/28/2019 12:46 PM    Calcium 9.5 02/28/2019 12:46 PM    Bilirubin, total 0.4 02/28/2019 12:46 PM    AST (SGOT) 14 (L) 02/28/2019 12:46 PM    Alk. phosphatase 72 02/28/2019 12:46 PM    Protein, total 7.3 02/28/2019 12:46 PM    Albumin 4.0 02/28/2019 12:46 PM    Globulin 3.3 02/28/2019 12:46 PM    A-G Ratio 1.2 02/28/2019 12:46 PM    ALT (SGPT) 20 02/28/2019 12:46 PM     Lab Results   Component Value Date/Time    TSH 1.90 09/06/2018 10:19 AM     Lab Results   Component Value Date/Time    Cholesterol, total 118 09/06/2018 10:19 AM    HDL Cholesterol 57 09/06/2018 10:19 AM    LDL, calculated 37.6 09/06/2018 10:19 AM    VLDL, calculated 23.4 09/06/2018 10:19 AM    Triglyceride 117 09/06/2018 10:19 AM    CHOL/HDL Ratio 2.1 09/06/2018 10:19 AM     Lab Results   Component Value Date/Time    Hemoglobin A1c 7.0 (H) 02/28/2019 12:46 PM    Hemoglobin A1c (POC) 6.7 05/30/2018 10:23 AM      Xray Lt Foot 5/16/19:  Stable findings of gouty arthritis in the foot  . Selene Riser Selene Riser present at the first metatarsal head, first MTP joint, base of the second, third and fourth metatarsals, dorsal navicula and cuneiforms, stable    ASSESSMENT:     1. Essential hypertension    2. Type 2 diabetes with nephropathy ([facility] [address]    3. ASHD (arteriosclerotic heart disease)    4. Hyperuricemia    5.  Idiopathic chronic gout of foot without tophus, unspecified laterality        PLAN: Pharmacologic Management: Medications reviewed with the patient. Take BP medications regularly. Orders Placed This Encounter    GLUCOSE, RANDOM    HEMOGLOBIN A1C WITH EAG    URIC ACID     She is getting blood work from oncology also. Low purine diet. Discussed DDx, follow-up & work-up. Discussed risk/benefit & side effect of treatment. Follow up visit as scheduled in 3 months, prn sooner. Low salt ADA diet & graduated excecise. Recommend home BP and glucose monitoring. Call with readings PRN. Health risk from non adherence discussed. Patient/ daughter voiced understanding. Follow-up and Dispositions    · Return in about 3 months (around 8/29/2019) for fasting.        Brayan Ferreira MD

## 2019-05-29 NOTE — PROGRESS NOTES
Isabell Cooper is a 80 y.o. female presents in office for f/u. There are no preventive care reminders to display for this patient. 1. Have you been to the ER, urgent care clinic since your last visit? Hospitalized since your last visit? Yes. 05/16/2019 [facility]. 2. Have you seen or consulted any other health care providers outside of the [address] [place] [address] since your last visit? Include any pap smears or colon screening.  No

## 2019-05-31 NOTE — PROGRESS NOTES
Uric acid was high, which is the cause of gout. Will try the low purine diet as instructed first, before trying medication. Random glucose was only borderline high; but hemoglobin A1c was a little higher than 3 months ago. The patient is advised to follow diet and exercise as tolerated and take the medications regularly. Also last blood pressure reading was high. Therefore please take the blood pressure medications regularly as prescribed and follow-up here in 1 to 2 weeks to recheck blood pressure. Prescribed medications for blood pressure are amlodipine 5 mg 2 tablets daily and metoprolol 50 mg half tablet twice daily.

## 2019-06-10 ENCOUNTER — TELEPHONE (OUTPATIENT)
Dept: FAMILY MEDICINE CLINIC | Age: 84
End: 2019-06-10

## 2019-06-10 NOTE — TELEPHONE ENCOUNTER
Pts daughter Tami Rahman calling to speak w/ nurse in regards to a previous gout medication.  She says her mom is having issues with her gout again

## 2019-06-11 NOTE — TELEPHONE ENCOUNTER
----- Message from Angelo Jennings MD sent at 5/31/2019  8:26 AM EDT -----  Uric acid was high, which is the cause of gout. Will try the low purine diet as instructed first, before trying medication. Random glucose was only borderline high; but hemoglobin A1c was a little higher than 3 months ago. The patient is advised to follow diet and exercise as tolerated and take the medications regularly. Also last blood pressure reading was high. Therefore please take the blood pressure medications regularly as prescribed and follow-up here in 1 to 2 weeks to recheck blood pressure. Prescribed medications for blood pressure are amlodipine 5 mg 2 tablets daily and metoprolol 50 mg half tablet twice daily.

## 2019-06-11 NOTE — TELEPHONE ENCOUNTER
Spoke with patient's daughter Sixto Maurer (on permission to release). Patient has pain in left foot. Sometimes in her foot. Read lab results, per Dr. Lizette Hopkins. Advised her to adhere to low purine diet and call us if pain persists or worsens. She also states her sister is a nurse and will check the mother's blood pressure and call us to report her reading.

## 2019-06-24 RX ORDER — SIMVASTATIN 5 MG/1
TABLET, FILM COATED ORAL
Qty: 90 TAB | Refills: 3 | Status: SHIPPED | OUTPATIENT
Start: 2019-06-24 | End: 2020-03-30 | Stop reason: SDUPTHER

## 2019-07-11 ENCOUNTER — TELEPHONE (OUTPATIENT)
Dept: FAMILY MEDICINE CLINIC | Age: 84
End: 2019-07-11

## 2019-07-11 DIAGNOSIS — I10 ESSENTIAL HYPERTENSION: Primary | ICD-10-CM

## 2019-07-11 DIAGNOSIS — E87.5 SERUM POTASSIUM ELEVATED: ICD-10-CM

## 2019-07-11 NOTE — TELEPHONE ENCOUNTER
Spoke to pts daughter and made aware of the message, verbalized understanding. Pended CMP. Please sign if ok.  Thank you  Grace Victoria LPN

## 2019-07-11 NOTE — TELEPHONE ENCOUNTER
Lab results from oncologist office on 6/17/2019 show potassium was quite high. Need to recheck potassium within 1 week. In the meantime, please stay on low potassium diet.

## 2019-07-19 ENCOUNTER — HOSPITAL ENCOUNTER (OUTPATIENT)
Dept: LAB | Age: 84
Discharge: HOME | End: 2019-07-19
Payer: MEDICARE

## 2019-07-19 ENCOUNTER — CLINICAL SUPPORT (OUTPATIENT)
Dept: FAMILY MEDICINE CLINIC | Age: 84
End: 2019-07-19

## 2019-07-19 DIAGNOSIS — I10 ESSENTIAL HYPERTENSION: ICD-10-CM

## 2019-07-19 DIAGNOSIS — E87.5 SERUM POTASSIUM ELEVATED: ICD-10-CM

## 2019-07-19 LAB
ANION GAP SERPL CALC-SCNC: 8 MMOL/L (ref 3–18)
BUN SERPL-MCNC: 24 MG/DL (ref 7–18)
BUN/CREAT SERPL: 16 (ref 12–20)
CALCIUM SERPL-MCNC: 9.1 MG/DL (ref 8.5–10.1)
CHLORIDE SERPL-SCNC: 104 MMOL/L (ref 100–111)
CO2 SERPL-SCNC: 26 MMOL/L (ref 21–32)
CREAT SERPL-MCNC: 1.52 MG/DL (ref 0.6–1.3)
GLUCOSE SERPL-MCNC: 153 MG/DL (ref 74–99)
POTASSIUM SERPL-SCNC: 4.5 MMOL/L (ref 3.5–5.5)
SODIUM SERPL-SCNC: 138 MMOL/L (ref 136–145)

## 2019-07-19 PROCEDURE — 36415 COLL VENOUS BLD VENIPUNCTURE: CPT

## 2019-07-19 PROCEDURE — 80048 BASIC METABOLIC PNL TOTAL CA: CPT

## 2019-07-23 DIAGNOSIS — E11.21 TYPE 2 DIABETES WITH NEPHROPATHY (HCC): Primary | ICD-10-CM

## 2019-07-26 ENCOUNTER — TELEPHONE (OUTPATIENT)
Dept: FAMILY MEDICINE CLINIC | Age: 84
End: 2019-07-26

## 2019-07-26 NOTE — TELEPHONE ENCOUNTER
----- Message from Benedict Wilde MD sent at 7/23/2019  2:19 PM EDT -----  Kidney function may be getting worse. Will recheck in about 2 weeks.

## 2019-07-26 NOTE — TELEPHONE ENCOUNTER
Called and spoke to patient's daughter Donald Yu, verified 2 patient identifiers, and provided lab results and recommendations to patient. Patient verbalized understanding and had no further questions or concerns. She will bring patient in in 2 weeks for redraw.

## 2019-08-09 ENCOUNTER — HOSPITAL ENCOUNTER (OUTPATIENT)
Dept: LAB | Age: 84
Discharge: HOME | End: 2019-08-09
Payer: MEDICARE

## 2019-08-09 DIAGNOSIS — E11.21 TYPE 2 DIABETES WITH NEPHROPATHY (HCC): ICD-10-CM

## 2019-08-09 LAB
ANION GAP SERPL CALC-SCNC: 8 MMOL/L (ref 3–18)
BUN SERPL-MCNC: 23 MG/DL (ref 7–18)
BUN/CREAT SERPL: 20 (ref 12–20)
CALCIUM SERPL-MCNC: 8.9 MG/DL (ref 8.5–10.1)
CHLORIDE SERPL-SCNC: 102 MMOL/L (ref 100–111)
CO2 SERPL-SCNC: 31 MMOL/L (ref 21–32)
CREAT SERPL-MCNC: 1.14 MG/DL (ref 0.6–1.3)
GLUCOSE SERPL-MCNC: 123 MG/DL (ref 74–99)
POTASSIUM SERPL-SCNC: 4.5 MMOL/L (ref 3.5–5.5)
SODIUM SERPL-SCNC: 141 MMOL/L (ref 136–145)

## 2019-08-09 PROCEDURE — 36415 COLL VENOUS BLD VENIPUNCTURE: CPT

## 2019-08-09 PROCEDURE — 80048 BASIC METABOLIC PNL TOTAL CA: CPT

## 2019-08-12 NOTE — PROGRESS NOTES
Called pt and left message. Call back number left and I myself or one of the other nurses will attempt to contact again. The call was to inform pt results Letter was sent  .

## 2019-08-28 ENCOUNTER — TELEPHONE (OUTPATIENT)
Dept: FAMILY MEDICINE CLINIC | Age: 84
End: 2019-08-28

## 2019-09-12 RX ORDER — AMLODIPINE BESYLATE 5 MG/1
TABLET ORAL
Qty: 180 TAB | Refills: 1 | Status: SHIPPED | OUTPATIENT
Start: 2019-09-12 | End: 2020-03-10

## 2019-09-12 RX ORDER — CLOPIDOGREL BISULFATE 75 MG/1
TABLET ORAL
Qty: 90 TAB | Refills: 1 | Status: SHIPPED | OUTPATIENT
Start: 2019-09-12 | End: 2020-04-28

## 2019-09-17 ENCOUNTER — OFFICE VISIT (OUTPATIENT)
Dept: FAMILY MEDICINE CLINIC | Age: 84
End: 2019-09-17

## 2019-09-17 VITALS
SYSTOLIC BLOOD PRESSURE: 144 MMHG | HEIGHT: 61 IN | HEART RATE: 59 BPM | OXYGEN SATURATION: 100 % | DIASTOLIC BLOOD PRESSURE: 78 MMHG | WEIGHT: 142 LBS | RESPIRATION RATE: 16 BRPM | TEMPERATURE: 96.4 F | BODY MASS INDEX: 26.81 KG/M2

## 2019-09-17 DIAGNOSIS — M10.09 IDIOPATHIC GOUT OF MULTIPLE SITES, UNSPECIFIED CHRONICITY: ICD-10-CM

## 2019-09-17 DIAGNOSIS — I10 ESSENTIAL HYPERTENSION: ICD-10-CM

## 2019-09-17 DIAGNOSIS — Z23 ENCOUNTER FOR IMMUNIZATION: Primary | ICD-10-CM

## 2019-09-17 DIAGNOSIS — E78.5 HYPERLIPIDEMIA, UNSPECIFIED HYPERLIPIDEMIA TYPE: ICD-10-CM

## 2019-09-17 DIAGNOSIS — E11.21 TYPE 2 DIABETES WITH NEPHROPATHY (HCC): ICD-10-CM

## 2019-09-17 DIAGNOSIS — R26.9 GAIT DIFFICULTY: ICD-10-CM

## 2019-09-17 DIAGNOSIS — E79.0 HYPERURICEMIA: ICD-10-CM

## 2019-09-17 RX ORDER — PREDNISONE 20 MG/1
TABLET ORAL
Qty: 8 TAB | Refills: 0 | Status: SHIPPED | OUTPATIENT
Start: 2019-09-17 | End: 2020-03-30

## 2019-09-17 RX ORDER — HYDROCHLOROTHIAZIDE 12.5 MG/1
6.25 TABLET ORAL DAILY
Qty: 45 TAB | Refills: 0 | Status: SHIPPED | OUTPATIENT
Start: 2019-09-17 | End: 2019-12-13 | Stop reason: SDUPTHER

## 2019-09-17 NOTE — PROGRESS NOTES
Kyler Hinton is a 80 y.o. female presents in office for diabetes, hypertension. Health Maintenance Due   Topic Date Due    Influenza Age 5 to Adult  08/01/2019    FOOT EXAM Q1  08/29/2019    MICROALBUMIN Q1  09/06/2019    LIPID PANEL Q1  09/06/2019       Immunization/s administered 9/17/2019 by Xiomy Richards with guardian's consent. Patient tolerated procedure well. No reactions noted. 1. Have you been to the ER, urgent care clinic since your last visit? Hospitalized since your last visit? Yes When: 8/2019 Where: [facility]    2. Have you seen or consulted any other health care providers outside of the [address] [place] [address] since your last visit? Include any pap smears or colon screening.  No

## 2019-09-17 NOTE — PROGRESS NOTES
SUBJECTIVE:  Demi Zazueta is a 80y.o. year old female   Chief Complaint   Patient presents with    Diabetes     f/u    Hypertension     f/u       History of Present Illness:   Went to ED for gout in Lt hand on 8/9/19. She was given prednisone. The hand is better. She has history of recurrent gout. She has elevated uric acid. She did not want to take allopurinol for hyperuricemia. 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 Today she complains of mild pain in right hand second MP area going on for last 2 to 3 days. Her BP has been running high last few visits and also last emergency room visit. She does not c/o any hypotensive symptoms. Her ASHD is controlled. No angina. She has a cardiologist    She is following ADA diet for DM II. She is not checking her glucose. She is on simvastatin for lipids w/o problem. She has a history of vertigo and takes as needed Antivert. She has not been needing this for last several months. She has chronic unsteady gait. Family wants her to have a walker. No Systemic, Cardiovascular or Respiratory symptoms. Past Medical History:   Diagnosis Date    Arteriosclerotic heart disease (ASHD)     CKD (chronic kidney disease), stage III ([address] 6/23/2015    CVA (cerebral infarction)     Diabetes ([facility] [address]     Gout 9/23/13    Left Foot    Hypertension     Leg weakness, bilateral 3/18/13     History reviewed. No pertinent surgical history. Current Outpatient Medications   Medication Sig    amLODIPine (NORVASC) 5 mg tablet take 2 tablets by mouth once daily    clopidogrel (PLAVIX) 75 mg tab take 1 tablet by mouth once daily    simvastatin (ZOCOR) 5 mg tablet take 1 tablet by mouth at bedtime    metoprolol tartrate (LOPRESSOR) 50 mg tablet Take 0.5 Tabs by mouth two (2) times a day.  glucose blood VI test strips (BLOOD GLUCOSE TEST) strip Check glucose once daily.   Re: DM .00    Blood-Glucose Meter monitoring kit Check glucose once daily. Re: DM .00    meclizine (ANTIVERT) 25 mg tablet Take 25 mg by mouth three (3) times daily.  Lancets misc Check glucose once daily. Re: DM .00    aspirin delayed-release 81 mg tablet Take 81 mg by mouth daily. No current facility-administered medications for this visit. Allergies   Allergen Reactions    Valsartan Anaphylaxis        Review of Systems:   Constitutional: No fever, chills, night sweats, malaise, dizziness. Cardiovascular: No angina, palpitations, PND, orthopnea, lightheadedness, edema, claudication. Respiratory: No dyspnea, wheeze, pleurisy, hemoptysis, unusual cough or sputum. Gastrointestinal: no nausea/ vomiting, bowel habit change, pain, LI symptoms, melena, hematochezia, anorexia. Psychiatric: No agitation, confusion/disorientation, suicidal or homicidal ideation. Musculoskeletal: gout as above. OBJECTIVE:  Physical Exam:   Constitutional: General Appearance:  well developed, overweight, nontoxic, in no acute distress. Visit Vitals  /78 (BP 1 Location: Left arm, BP Patient Position: Sitting)   Pulse (!) 59   Temp 96.4 °F (35.8 °C) (Oral)   Resp 16   Ht 5' 1\" (1.549 m)   Wt 142 lb (64.4 kg)   SpO2 100%   BMI 26.83 kg/m²       Pulmonary: Respiratory effort: normal; no dyspnea, no retractions, no accessory muscle use. Auscultation: normal & symmetrical air exchange; no rales, no rhonchi, no wheeze; no rubs    Cardiovascular[de-identified] Palpation of Heart: PMI not displaced or enlarged, no thrills, no heaves. Auscultation of Heart: RRR; G I/VI systolic murmur, no rubs, no gallops. Neck: carotid arteries- normal volume & without bruit; no JVD. Extremities: no edema, no active varicosity. GI[de-identified] Normal bowel sounds. No masses; no tenderness; no rebound/rigidity; no CVA tenderness. No hepatosplenomegaly. Psychiatric: Oriented to time, place and person. Musculoskeletal:   NC/AT. Neck-supple.     Hands: Diffuse degenerative changes of small joints. There is no significant effusion, redness, heat or tenderness. Walking is slow. Feet/ ankle: no acute swelling, tenderness, heat. Diabetic foot exam:     Left Foot:   Visual Exam: normal    Pulse DP: 1+ (weak)   Filament test: normal sensation    Vibratory sensation: normal      Right Foot:   Visual Exam: normal    Pulse DP: 1+ (weak)   Filament test: normal sensation    Vibratory sensation: normal    Lab Results   Component Value Date/Time    Sodium 141 08/09/2019 09:02 AM    Potassium 4.5 08/09/2019 09:02 AM    Chloride 102 08/09/2019 09:02 AM    CO2 31 08/09/2019 09:02 AM    Anion gap 8 08/09/2019 09:02 AM    Glucose 123 (H) 08/09/2019 09:02 AM    BUN 23 (H) 08/09/2019 09:02 AM    Creatinine 1.14 08/09/2019 09:02 AM    BUN/Creatinine ratio 20 08/09/2019 09:02 AM    GFR est AA 54 (L) 08/09/2019 09:02 AM    GFR est non-AA 45 (L) 08/09/2019 09:02 AM    Calcium 8.9 08/09/2019 09:02 AM    Bilirubin, total 0.4 02/28/2019 12:46 PM    AST (SGOT) 14 (L) 02/28/2019 12:46 PM    Alk. phosphatase 72 02/28/2019 12:46 PM    Protein, total 7.3 02/28/2019 12:46 PM    Albumin 4.0 02/28/2019 12:46 PM    Globulin 3.3 02/28/2019 12:46 PM    A-G Ratio 1.2 02/28/2019 12:46 PM    ALT (SGPT) 20 02/28/2019 12:46 PM     Lab Results   Component Value Date/Time    TSH 1.90 09/06/2018 10:19 AM     Lab Results   Component Value Date/Time    Cholesterol, total 118 09/06/2018 10:19 AM    HDL Cholesterol 57 09/06/2018 10:19 AM    LDL, calculated 37.6 09/06/2018 10:19 AM    VLDL, calculated 23.4 09/06/2018 10:19 AM    Triglyceride 117 09/06/2018 10:19 AM    CHOL/HDL Ratio 2.1 09/06/2018 10:19 AM     Lab Results   Component Value Date/Time    Hemoglobin A1c 7.4 (H) 05/29/2019 10:50 AM    Hemoglobin A1c (POC) 6.7 05/30/2018 10:23 AM      Xray Lt hand 8/3/19:  1. No evidence of an acute osseous abnormality. 2. Redemonstration of degenerative changes, as detailed. ASSESSMENT:     1. Encounter for immunization    2.  Essential hypertension    3. Type 2 diabetes with nephropathy ([facility] [address]    4. Hyperuricemia    5. Hyperlipidemia, unspecified hyperlipidemia type    6. Gait difficulty        PLAN:     Pharmacologic Management: Medications reviewed with the patient. Add hydrochlorothiazide 6.25 mg daily. Note was given because she had hyperuricemia. All patient's family is familiar with gout. If there is a flareup she may take prednisone 40 for 4 days. Orders Placed This Encounter    INFLUENZA VACCINE INACTIVATED (IIV), SUBUNIT, ADJUVANTED, IM    LIPID PANEL    MICROALBUMIN, UR, RAND W/ MICROALB/CREAT RATIO    HEMOGLOBIN A1C WITH EAG    HM DIABETES FOOT EXAM    hydroCHLOROthiazide (HYDRODIURIL) 12.5 mg tablet    Walker misc    predniSONE (DELTASONE) 20 mg tablet   Blood work is deferred to next visit in a month. She is getting blood work from oncology also. Low purine diet. Discussed DDx, follow-up & work-up. Discussed risk/benefit & side effect of treatment. Follow up visit as scheduled in 3 months, prn sooner. Low salt ADA diet & graduated excecise. Recommend home BP and glucose monitoring. Call with readings PRN. Health risk from non adherence discussed. Patient/ daughter voiced understanding. Follow-up and Dispositions    · Return in about 4 weeks (around 10/15/2019) for fasting.          Isra Ngo MD

## 2019-10-17 ENCOUNTER — OFFICE VISIT (OUTPATIENT)
Dept: FAMILY MEDICINE CLINIC | Age: 84
End: 2019-10-17

## 2019-10-17 ENCOUNTER — HOSPITAL ENCOUNTER (OUTPATIENT)
Dept: LAB | Age: 84
Discharge: HOME | End: 2019-10-17
Payer: MEDICARE

## 2019-10-17 VITALS
TEMPERATURE: 96.5 F | HEIGHT: 61 IN | HEART RATE: 60 BPM | DIASTOLIC BLOOD PRESSURE: 70 MMHG | SYSTOLIC BLOOD PRESSURE: 136 MMHG | WEIGHT: 138 LBS | BODY MASS INDEX: 26.06 KG/M2 | RESPIRATION RATE: 14 BRPM

## 2019-10-17 DIAGNOSIS — E78.5 HYPERLIPIDEMIA, UNSPECIFIED HYPERLIPIDEMIA TYPE: ICD-10-CM

## 2019-10-17 DIAGNOSIS — I10 ESSENTIAL HYPERTENSION: Primary | ICD-10-CM

## 2019-10-17 DIAGNOSIS — E79.0 HYPERURICEMIA: ICD-10-CM

## 2019-10-17 DIAGNOSIS — E11.21 TYPE 2 DIABETES WITH NEPHROPATHY (HCC): ICD-10-CM

## 2019-10-17 DIAGNOSIS — I10 ESSENTIAL HYPERTENSION: ICD-10-CM

## 2019-10-17 LAB
ALT SERPL-CCNC: 15 U/L (ref 13–56)
ANION GAP SERPL CALC-SCNC: 9 MMOL/L (ref 3–18)
AST SERPL-CCNC: 13 U/L (ref 10–38)
BUN SERPL-MCNC: 20 MG/DL (ref 7–18)
BUN/CREAT SERPL: 16 (ref 12–20)
CALCIUM SERPL-MCNC: 9.3 MG/DL (ref 8.5–10.1)
CHLORIDE SERPL-SCNC: 99 MMOL/L (ref 100–111)
CHOLEST SERPL-MCNC: 127 MG/DL
CO2 SERPL-SCNC: 27 MMOL/L (ref 21–32)
CREAT SERPL-MCNC: 1.24 MG/DL (ref 0.6–1.3)
EST. AVERAGE GLUCOSE BLD GHB EST-MCNC: 154 MG/DL
GLUCOSE SERPL-MCNC: 123 MG/DL (ref 74–99)
HBA1C MFR BLD: 7 % (ref 4.2–5.6)
HDLC SERPL-MCNC: 59 MG/DL (ref 40–60)
HDLC SERPL: 2.2 {RATIO} (ref 0–5)
LDLC SERPL CALC-MCNC: 47.6 MG/DL (ref 0–100)
LIPID PROFILE,FLP: NORMAL
POTASSIUM SERPL-SCNC: 4.4 MMOL/L (ref 3.5–5.5)
SODIUM SERPL-SCNC: 135 MMOL/L (ref 136–145)
TRIGL SERPL-MCNC: 102 MG/DL (ref ?–150)
URATE SERPL-MCNC: 7.4 MG/DL (ref 2.6–7.2)
VLDLC SERPL CALC-MCNC: 20.4 MG/DL

## 2019-10-17 PROCEDURE — 84450 TRANSFERASE (AST) (SGOT): CPT

## 2019-10-17 PROCEDURE — 80048 BASIC METABOLIC PNL TOTAL CA: CPT

## 2019-10-17 PROCEDURE — 84550 ASSAY OF BLOOD/URIC ACID: CPT

## 2019-10-17 PROCEDURE — 36415 COLL VENOUS BLD VENIPUNCTURE: CPT

## 2019-10-17 PROCEDURE — 83036 HEMOGLOBIN GLYCOSYLATED A1C: CPT

## 2019-10-17 PROCEDURE — 80061 LIPID PANEL: CPT

## 2019-10-17 PROCEDURE — 84460 ALANINE AMINO (ALT) (SGPT): CPT

## 2019-10-17 NOTE — PROGRESS NOTES
Miller Ureña is a 80 y.o. female (: 1930) presenting to address: Chief Complaint Patient presents with  Cholesterol Problem f/u  Diabetes f/u  Hypertension f/u Medication list has been reviewed with patient during office visit today. Health maintenance with due date reviewed with patient. Health Maintenance Due Topic Date Due  
 MICROALBUMIN Q1  2019  LIPID PANEL Q1  2019 Vitals:  
 10/17/19 5624 BP: 140/68 Pulse: 60 Resp: 14 Temp: 96.5 °F (35.8 °C) TempSrc: Oral  
Weight: 138 lb (62.6 kg) Height: 5' 1\" (1.549 m) PainSc:   0 - No pain Hearing/Vision: No exam data present Learning Assessment:  
 
Learning Assessment 2019 PRIMARY LEARNER Patient HIGHEST LEVEL OF EDUCATION - PRIMARY LEARNER  -  
PRIMARY LANGUAGE ENGLISH  
LEARNER PREFERENCE PRIMARY READING  
  LISTENING  
  VIDEOS  
ANSWERED BY patient RELATIONSHIP SELF Depression Screening:  
 
3 most recent PHQ Screens 10/17/2019 Little interest or pleasure in doing things Not at all Feeling down, depressed, irritable, or hopeless Not at all Total Score PHQ 2 0 Fall Risk Assessment:  
 
Fall Risk Assessment, last 12 mths 10/17/2019 Able to walk? Yes Fall in past 12 months? No  
Number of falls in past 12 months -  
 
Abuse Screening:  
 
Abuse Screening Questionnaire 2019 Do you ever feel afraid of your partner? [place] Are you in a relationship with someone who physically or mentally threatens you? [place] Is it safe for you to go home? Cornelia López Coordination of Care Questionaire: 1. Have you been to the ER, urgent care clinic since your last visit? Hospitalized since your last visit? no 
 
2. Have you seen or consulted any other health care providers outside of the [address] [place] [address] since your last visit? Include any pap smears or colon screening.

## 2019-10-17 NOTE — PROGRESS NOTES
SUBJECTIVE: 
Shelia Munoz is a 80y.o. year old female Chief Complaint Patient presents with  Cholesterol Problem f/u  Diabetes f/u  Hypertension f/u History of Present Illness:  
 
Her BP has been running high last few visits and also last emergency room visit. At the last visit here low-dose hydrochlorothiazide was added. Low-dose was given because of her history of gout and hyperuricemia. She is here for follow-up. She does not c/o any hypotensive symptoms. Her ASHD is controlled. No angina. She has a cardiologist. 
 
She has history of recurrent gout. She has elevated uric acid. She did not want to take allopurinol for hyperuricemia. 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 She is following ADA diet for DM II. She is not checking her glucose. She is on simvastatin for lipids w/o problem. No Systemic, Cardiovascular or Respiratory symptoms. Past Medical History:  
Diagnosis Date  Arteriosclerotic heart disease (ASHD)  CKD (chronic kidney disease), stage III ([address] 6/23/2015  CVA (cerebral infarction)  Diabetes ([facility] [address]  Gout 9/23/13 Left Foot  Hypertension  Leg weakness, bilateral 3/18/13 History reviewed. No pertinent surgical history. Current Outpatient Medications Medication Sig  
 hydroCHLOROthiazide (HYDRODIURIL) 12.5 mg tablet Take 0.5 Tabs by mouth daily.  Walker misc Walker for aid in ambulation and preventing falls.  predniSONE (DELTASONE) 20 mg tablet Take two tablets by mouth daily (for gout attack).  amLODIPine (NORVASC) 5 mg tablet take 2 tablets by mouth once daily  clopidogrel (PLAVIX) 75 mg tab take 1 tablet by mouth once daily  simvastatin (ZOCOR) 5 mg tablet take 1 tablet by mouth at bedtime  metoprolol tartrate (LOPRESSOR) 50 mg tablet Take 0.5 Tabs by mouth two (2) times a day.   
 glucose blood VI test strips (BLOOD GLUCOSE TEST) strip Check glucose once daily. Re: DM .00  Blood-Glucose Meter monitoring kit Check glucose once daily. Re: DM .00  
 meclizine (ANTIVERT) 25 mg tablet Take 25 mg by mouth three (3) times daily.  Lancets misc Check glucose once daily. Re: DM .00  aspirin delayed-release 81 mg tablet Take 81 mg by mouth daily. No current facility-administered medications for this visit. Allergies Allergen Reactions  Valsartan Anaphylaxis Review of Systems:  
Constitutional: No fever, chills, night sweats, malaise, dizziness. Cardiovascular: No angina, palpitations, PND, orthopnea, lightheadedness, edema, claudication. Respiratory: No dyspnea, wheeze, pleurisy, hemoptysis, unusual cough or sputum. Gastrointestinal: no nausea/ vomiting, bowel habit change, pain, LI symptoms, melena, hematochezia, anorexia. Psychiatric: No agitation, confusion/disorientation, suicidal or homicidal ideation. Musculoskeletal: gout as above. OBJECTIVE: 
Physical Exam:  
Constitutional: General Appearance:  well developed, overweight, nontoxic, in no acute distress. Visit Vitals /70 (BP 1 Location: Left arm, BP Patient Position: Sitting) Pulse 60 Temp 96.5 °F (35.8 °C) (Oral) Resp 14 Ht 5' 1\" (1.549 m) Wt 138 lb (62.6 kg) BMI 26.07 kg/m² Pulmonary: Respiratory effort: normal; no dyspnea, no retractions, no accessory muscle use. Auscultation: normal & symmetrical air exchange; no rales, no rhonchi, no wheeze; no rubs Cardiovascular[de-identified] Palpation of Heart: PMI not displaced or enlarged, no thrills, no heaves. Auscultation of Heart: RRR; G I/VI systolic murmur, no rubs, no gallops. Neck: carotid arteries- normal volume & without bruit; no JVD. Extremities: no edema, no active varicosity. GI[de-identified] Normal bowel sounds. No masses; no tenderness; no rebound/rigidity; no CVA tenderness. No hepatosplenomegaly. Psychiatric: Oriented to time, place and person. Musculoskeletal: NC/AT. Neck-supple. Hands: Diffuse degenerative changes of small joints. There is no significant effusion, redness, heat or tenderness. Walking is slow. Feet/ ankle: no acute swelling, tenderness, heat. Lab Results Component Value Date/Time Sodium 141 08/09/2019 09:02 AM  
 Potassium 4.5 08/09/2019 09:02 AM  
 Chloride 102 08/09/2019 09:02 AM  
 CO2 31 08/09/2019 09:02 AM  
 Anion gap 8 08/09/2019 09:02 AM  
 Glucose 123 (H) 08/09/2019 09:02 AM  
 BUN 23 (H) 08/09/2019 09:02 AM  
 Creatinine 1.14 08/09/2019 09:02 AM  
 BUN/Creatinine ratio 20 08/09/2019 09:02 AM  
 GFR est AA 54 (L) 08/09/2019 09:02 AM  
 GFR est non-AA 45 (L) 08/09/2019 09:02 AM  
 Calcium 8.9 08/09/2019 09:02 AM  
 Bilirubin, total 0.4 02/28/2019 12:46 PM  
 AST (SGOT) 14 (L) 02/28/2019 12:46 PM  
 Alk. phosphatase 72 02/28/2019 12:46 PM  
 Protein, total 7.3 02/28/2019 12:46 PM  
 Albumin 4.0 02/28/2019 12:46 PM  
 Globulin 3.3 02/28/2019 12:46 PM  
 A-G Ratio 1.2 02/28/2019 12:46 PM  
 ALT (SGPT) 20 02/28/2019 12:46 PM  
 
Lab Results Component Value Date/Time TSH 1.90 09/06/2018 10:19 AM  
 
Lab Results Component Value Date/Time Cholesterol, total 118 09/06/2018 10:19 AM  
 HDL Cholesterol 57 09/06/2018 10:19 AM  
 LDL, calculated 37.6 09/06/2018 10:19 AM  
 VLDL, calculated 23.4 09/06/2018 10:19 AM  
 Triglyceride 117 09/06/2018 10:19 AM  
 CHOL/HDL Ratio 2.1 09/06/2018 10:19 AM  
 
Lab Results Component Value Date/Time Hemoglobin A1c 7.4 (H) 05/29/2019 10:50 AM  
 Hemoglobin A1c (POC) 6.7 05/30/2018 10:23 AM  
 
 Xray Lt hand 8/3/19: 
1. No evidence of an acute osseous abnormality. 2. Redemonstration of degenerative changes, as detailed. ASSESSMENT:  
 
1. Essential hypertension 2. Hyperuricemia 3. Hyperlipidemia, unspecified hyperlipidemia type 4. Type 2 diabetes with nephropathy (HCC) PLAN:  
 
Pharmacologic Management: Medications reviewed with the patient. No change at this time. Orders Placed This Encounter  LIPID PANEL  
 METABOLIC PANEL, BASIC  ALT  AST  URIC ACID  
 HEMOGLOBIN A1C WITH EAG She is getting blood work from oncology also. Low purine diet. Discussed DDx, follow-up & work-up. Discussed risk/benefit & side effect of treatment. Follow up visit as scheduled in 3 months, prn sooner. Low salt ADA diet & graduated excecise. Recommend home BP and glucose monitoring. Call with readings PRN. Health risk from non adherence discussed. Patient/ daughter voiced understanding. Follow-up and Dispositions · Return in about 3 months (around 1/17/2020).  
  
 
 
 
Nicole Evans MD

## 2019-10-18 NOTE — PROGRESS NOTES
Lipid panel showed cholesterol was good. Hemoglobin A1c for diabetes was 7.0, showing acceptable control. Kidney function is stable for last 1 year. Uric acid is improved, still borderline high.  
Liver enzymes were normal.

## 2019-10-19 ENCOUNTER — TELEPHONE (OUTPATIENT)
Dept: FAMILY MEDICINE CLINIC | Age: 84
End: 2019-10-19

## 2019-10-19 NOTE — TELEPHONE ENCOUNTER
----- Message from Randy Nguyen MD sent at 10/18/2019 12:45 PM EDT -----  Lipid panel showed cholesterol was good. Hemoglobin A1c for diabetes was 7.0, showing acceptable control. Kidney function is stable for last 1 year. Uric acid is improved, still borderline high.   Liver enzymes were normal.

## 2019-10-19 NOTE — TELEPHONE ENCOUNTER
Spoke to patients' daughter, Michael Martinez about results. She acknowledge results given with additional instructions advise per doctor. She has no other concerns at this time. Per PCP  Lipid panel showed cholesterol was good. Hemoglobin A1c for diabetes was 7.0, showing acceptable control. Kidney function is stable for last 1 year. Uric acid is improved, still borderline high.    Liver enzymes were normal.

## 2020-01-01 ENCOUNTER — VIRTUAL VISIT (OUTPATIENT)
Dept: FAMILY MEDICINE CLINIC | Age: 85
End: 2020-01-01
Payer: MEDICARE

## 2020-01-01 DIAGNOSIS — E78.5 HYPERLIPIDEMIA, UNSPECIFIED HYPERLIPIDEMIA TYPE: ICD-10-CM

## 2020-01-01 DIAGNOSIS — I10 ESSENTIAL HYPERTENSION: ICD-10-CM

## 2020-01-01 DIAGNOSIS — F01.518 MULTI-INFARCT DEMENTIA WITH BEHAVIOR DISTURBANCE: Primary | ICD-10-CM

## 2020-01-01 DIAGNOSIS — E11.21 TYPE 2 DIABETES WITH NEPHROPATHY (HCC): ICD-10-CM

## 2020-01-01 PROCEDURE — G8432 DEP SCR NOT DOC, RNG: HCPCS | Performed by: FAMILY MEDICINE

## 2020-01-01 PROCEDURE — 3051F HG A1C>EQUAL 7.0%<8.0%: CPT | Performed by: FAMILY MEDICINE

## 2020-01-01 PROCEDURE — 1090F PRES/ABSN URINE INCON ASSESS: CPT | Performed by: FAMILY MEDICINE

## 2020-01-01 PROCEDURE — 99213 OFFICE O/P EST LOW 20 MIN: CPT | Performed by: FAMILY MEDICINE

## 2020-01-01 PROCEDURE — G8427 DOCREV CUR MEDS BY ELIG CLIN: HCPCS | Performed by: FAMILY MEDICINE

## 2020-01-01 PROCEDURE — 1101F PT FALLS ASSESS-DOCD LE1/YR: CPT | Performed by: FAMILY MEDICINE

## 2020-01-01 RX ORDER — HYDROCHLOROTHIAZIDE 12.5 MG/1
TABLET ORAL
Qty: 45 TAB | Refills: 1 | Status: SHIPPED | OUTPATIENT
Start: 2020-01-01 | End: 2021-01-01

## 2020-01-01 RX ORDER — METOPROLOL TARTRATE 50 MG/1
TABLET ORAL
Qty: 90 TAB | Refills: 1 | Status: SHIPPED | OUTPATIENT
Start: 2020-01-01 | End: 2021-01-01

## 2020-01-16 ENCOUNTER — OFFICE VISIT (OUTPATIENT)
Dept: FAMILY MEDICINE CLINIC | Facility: CLINIC | Age: 85
End: 2020-01-16

## 2020-01-16 ENCOUNTER — HOSPITAL ENCOUNTER (OUTPATIENT)
Dept: LAB | Age: 85
Discharge: HOME | End: 2020-01-16
Payer: MEDICARE

## 2020-01-16 VITALS
RESPIRATION RATE: 15 BRPM | SYSTOLIC BLOOD PRESSURE: 120 MMHG | HEIGHT: 61 IN | WEIGHT: 143 LBS | TEMPERATURE: 97 F | OXYGEN SATURATION: 99 % | HEART RATE: 59 BPM | DIASTOLIC BLOOD PRESSURE: 80 MMHG | BODY MASS INDEX: 27 KG/M2

## 2020-01-16 DIAGNOSIS — I10 ESSENTIAL HYPERTENSION: Primary | ICD-10-CM

## 2020-01-16 DIAGNOSIS — E11.21 TYPE 2 DIABETES WITH NEPHROPATHY (HCC): ICD-10-CM

## 2020-01-16 DIAGNOSIS — E79.0 HYPERURICEMIA: ICD-10-CM

## 2020-01-16 DIAGNOSIS — E78.5 HYPERLIPIDEMIA, UNSPECIFIED HYPERLIPIDEMIA TYPE: ICD-10-CM

## 2020-01-16 LAB
CREAT UR-MCNC: 311 MG/DL (ref 30–125)
GLUCOSE POC: 160 MG/DL
HBA1C MFR BLD HPLC: 6.4 %
MICROALBUMIN UR-MCNC: 305 MG/DL (ref 0–3)
MICROALBUMIN/CREAT UR-RTO: 981 MG/G (ref 0–30)

## 2020-01-16 PROCEDURE — 82043 UR ALBUMIN QUANTITATIVE: CPT

## 2020-01-16 NOTE — PROGRESS NOTES
Chief Complaint Patient presents with  Hypertension  Cholesterol Problem  Diabetes 1. Have you been to the ER, urgent care clinic since your last visit? Hospitalized since your last visit? No 
 
2. Have you seen or consulted any other health care providers outside of the [address] [place] [address] since your last visit? Include any pap smears or colon screening.  No

## 2020-01-16 NOTE — PROGRESS NOTES
SUBJECTIVE: 
Edmundo Pittman is a 80y.o. year old female Chief Complaint Patient presents with  Hypertension  Cholesterol Problem  Diabetes History of Present Illness:  
 
Her BP has been running better. She is here for follow-up. She does not c/o any hypotensive symptoms. Her ASHD is controlled. No angina. She has a cardiologist. 
 
She has history of recurrent gout. She has elevated uric acid. She did not want to take allopurinol for hyperuricemia. 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 She is following ADA diet for DM II. She is not checking her glucose. She is on simvastatin for lipids w/o problem. No Systemic, Cardiovascular or Respiratory symptoms. Past Medical History:  
Diagnosis Date  Arteriosclerotic heart disease (ASHD)  CKD (chronic kidney disease), stage III ([facility] [address] 6/23/2015  CVA (cerebral infarction)  [facility]  Gout 9/23/13 Left Foot  Hypertension  Leg weakness, bilateral 3/18/13 History reviewed. No pertinent surgical history. Current Outpatient Medications Medication Sig  
 hydroCHLOROthiazide (HYDRODIURIL) 12.5 mg tablet take 1/2 tablet by mouth daily  metoprolol tartrate (LOPRESSOR) 50 mg tablet take 1/2 tablet by mouth twice a day  predniSONE (DELTASONE) 20 mg tablet Take two tablets by mouth daily (for gout attack).  amLODIPine (NORVASC) 5 mg tablet take 2 tablets by mouth once daily  clopidogrel (PLAVIX) 75 mg tab take 1 tablet by mouth once daily  simvastatin (ZOCOR) 5 mg tablet take 1 tablet by mouth at bedtime  glucose blood VI test strips (BLOOD GLUCOSE TEST) strip Check glucose once daily. Re: DM .00  Blood-Glucose Meter monitoring kit Check glucose once daily. Re: DM .00  
 meclizine (ANTIVERT) 25 mg tablet Take 25 mg by mouth three (3) times daily.  Lancets misc Check glucose once daily. Re: DM .00  aspirin delayed-release 81 mg tablet Take 81 mg by mouth daily.  Walker misc Walker for aid in ambulation and preventing falls. No current facility-administered medications for this visit. Allergies Allergen Reactions  Valsartan Anaphylaxis Review of Systems:  
Constitutional: No fever, chills, night sweats, malaise, dizziness. Cardiovascular: No angina, palpitations, PND, orthopnea, lightheadedness, edema, claudication. Respiratory: No dyspnea, wheeze, pleurisy, hemoptysis, unusual cough or sputum. Gastrointestinal: no nausea/ vomiting, bowel habit change, pain, LI symptoms, melena, hematochezia, anorexia. Psychiatric: No agitation, confusion/disorientation, suicidal or homicidal ideation. Musculoskeletal: gout as above. OBJECTIVE: 
Physical Exam:  
Constitutional: General Appearance:  well developed, overweight, nontoxic, in no acute distress. Visit Vitals /80 Pulse (!) 59 Temp 97 °F (36.1 °C) (Oral) Resp 15 Ht 5' 1\" (1.549 m) Wt 143 lb (64.9 kg) SpO2 99% BMI 27.02 kg/m² Pulmonary: Respiratory effort: normal; no dyspnea, no retractions, no accessory muscle use. Auscultation: normal & symmetrical air exchange; no rales, no rhonchi, no wheeze; no rubs Cardiovascular[de-identified] Palpation of Heart: PMI not displaced or enlarged, no thrills, no heaves. Auscultation of Heart: RRR; G I/VI systolic murmur, no rubs, no gallops. Neck: carotid arteries- normal volume & without bruit; no JVD. Extremities: no edema, no active varicosity. GI[de-identified] Normal bowel sounds. No masses; no tenderness; no rebound/rigidity; no CVA tenderness. No hepatosplenomegaly. Psychiatric: Oriented to time, place and person. Musculoskeletal:  
NC/AT. Neck-supple. Hands: Diffuse degenerative changes of small joints. There is no significant effusion, redness, heat or tenderness. Walking is slow. Feet/ ankle: no acute swelling, tenderness, heat. Lab Results Component Value Date/Time Sodium 135 (L) 10/17/2019 09:45 AM  
 Potassium 4.4 10/17/2019 09:45 AM  
 Chloride 99 (L) 10/17/2019 09:45 AM  
 CO2 27 10/17/2019 09:45 AM  
 Anion gap 9 10/17/2019 09:45 AM  
 Glucose 123 (H) 10/17/2019 09:45 AM  
 BUN 20 (H) 10/17/2019 09:45 AM  
 Creatinine 1.24 10/17/2019 09:45 AM  
 BUN/Creatinine ratio 16 10/17/2019 09:45 AM  
 GFR est AA 49 (L) 10/17/2019 09:45 AM  
 GFR est non-AA 41 (L) 10/17/2019 09:45 AM  
 Calcium 9.3 10/17/2019 09:45 AM  
 Bilirubin, total 0.4 02/28/2019 12:46 PM  
 AST (SGOT) 13 10/17/2019 09:45 AM  
 Alk. phosphatase 72 02/28/2019 12:46 PM  
 Protein, total 7.3 02/28/2019 12:46 PM  
 Albumin 4.0 02/28/2019 12:46 PM  
 Globulin 3.3 02/28/2019 12:46 PM  
 A-G Ratio 1.2 02/28/2019 12:46 PM  
 ALT (SGPT) 15 10/17/2019 09:45 AM  
 
Lab Results Component Value Date/Time Glucose 123 (H) 10/17/2019 09:45 AM  
 Glucose  01/16/2020 10:58 AM  
 
Lab Results Component Value Date/Time TSH 1.90 09/06/2018 10:19 AM  
 
Lab Results Component Value Date/Time Cholesterol, total 127 10/17/2019 09:45 AM  
 HDL Cholesterol 59 10/17/2019 09:45 AM  
 LDL, calculated 47.6 10/17/2019 09:45 AM  
 VLDL, calculated 20.4 10/17/2019 09:45 AM  
 Triglyceride 102 10/17/2019 09:45 AM  
 CHOL/HDL Ratio 2.2 10/17/2019 09:45 AM  
 
Lab Results Component Value Date/Time Hemoglobin A1c 7.0 (H) 10/17/2019 09:45 AM  
 Hemoglobin A1c (POC) 6.4 01/16/2020 10:57 AM  
 
Lab Results Component Value Date/Time Uric acid 7.4 (H) 10/17/2019 09:45 AM  
 
 
 Xray Lt hand 8/3/19: 
1. No evidence of an acute osseous abnormality. 2. Redemonstration of degenerative changes, as detailed. ASSESSMENT:  
 
1. Essential hypertension 2. Type 2 diabetes with nephropathy (HCC) 3. Hyperlipidemia, unspecified hyperlipidemia type 4. Hyperuricemia PLAN:  
 
Pharmacologic Management: Medications reviewed with the patient. No lopez Orders Placed This Encounter  MICROALBUMIN, UR, RAND W/ MICROALB/CREAT RATIO  AMB POC HEMOGLOBIN A1C  
 AMB POC GLUCOSE, QUANTITATIVE, BLOOD She is getting blood work from oncology also. Low purine diet. Discussed DDx, follow-up & work-up. Discussed risk/benefit & side effect of treatment. Follow up visit as scheduled in 3 months, prn sooner. Low salt ADA diet & graduated excecise. Recommend home BP and glucose monitoring. Call with readings PRN. Health risk from non adherence discussed. Patient/ daughter voiced understanding. Follow-up and Dispositions · Return in about 3 months (around 4/16/2020).  
  
 
 
 
Amparo Ramirez MD

## 2020-01-19 NOTE — PROGRESS NOTES
Urine microalbumin was high like in the past.  Will keep controlling diabetes and blood pressure. She allergic to other medication for it, like valsartan.

## 2020-03-10 ENCOUNTER — HOSPITAL ENCOUNTER (OUTPATIENT)
Dept: LAB | Age: 85
Discharge: HOME | End: 2020-03-10
Payer: MEDICARE

## 2020-03-10 ENCOUNTER — OFFICE VISIT (OUTPATIENT)
Dept: FAMILY MEDICINE CLINIC | Facility: CLINIC | Age: 85
End: 2020-03-10

## 2020-03-10 VITALS
WEIGHT: 149 LBS | HEIGHT: 61 IN | OXYGEN SATURATION: 96 % | BODY MASS INDEX: 28.13 KG/M2 | SYSTOLIC BLOOD PRESSURE: 144 MMHG | RESPIRATION RATE: 17 BRPM | DIASTOLIC BLOOD PRESSURE: 70 MMHG | HEART RATE: 74 BPM | TEMPERATURE: 96.5 F

## 2020-03-10 DIAGNOSIS — R41.89 COGNITIVE IMPAIRMENT: Primary | ICD-10-CM

## 2020-03-10 DIAGNOSIS — I10 ESSENTIAL HYPERTENSION: ICD-10-CM

## 2020-03-10 DIAGNOSIS — E11.21 TYPE 2 DIABETES WITH NEPHROPATHY (HCC): ICD-10-CM

## 2020-03-10 DIAGNOSIS — R41.3 MEMORY IMPAIRMENT: ICD-10-CM

## 2020-03-10 DIAGNOSIS — R41.89 COGNITIVE IMPAIRMENT: ICD-10-CM

## 2020-03-10 LAB
ANION GAP SERPL CALC-SCNC: 9 MMOL/L (ref 3–18)
APPEARANCE UR: CLEAR
BACTERIA URNS QL MICRO: ABNORMAL /HPF
BASOPHILS # BLD: 0 K/UL (ref 0–0.1)
BASOPHILS NFR BLD: 0 % (ref 0–2)
BILIRUB UR QL: NEGATIVE
BUN SERPL-MCNC: 34 MG/DL (ref 7–18)
BUN/CREAT SERPL: 24 (ref 12–20)
CALCIUM SERPL-MCNC: 9.3 MG/DL (ref 8.5–10.1)
CHLORIDE SERPL-SCNC: 105 MMOL/L (ref 100–111)
CO2 SERPL-SCNC: 26 MMOL/L (ref 21–32)
COLOR UR: YELLOW
CREAT SERPL-MCNC: 1.42 MG/DL (ref 0.6–1.3)
DIFFERENTIAL METHOD BLD: ABNORMAL
EOSINOPHIL # BLD: 0.1 K/UL (ref 0–0.4)
EOSINOPHIL NFR BLD: 2 % (ref 0–5)
EPITH CASTS URNS QL MICRO: ABNORMAL /LPF (ref 0–5)
ERYTHROCYTE [DISTWIDTH] IN BLOOD BY AUTOMATED COUNT: 16 % (ref 11.6–14.5)
GLUCOSE SERPL-MCNC: 233 MG/DL (ref 74–99)
GLUCOSE UR STRIP.AUTO-MCNC: 100 MG/DL
HCT VFR BLD AUTO: 41.5 % (ref 35–45)
HGB BLD-MCNC: 13.5 G/DL (ref 12–16)
HGB UR QL STRIP: NEGATIVE
HYALINE CASTS URNS QL MICRO: ABNORMAL /LPF (ref 0–2)
KETONES UR QL STRIP.AUTO: NEGATIVE MG/DL
LEUKOCYTE ESTERASE UR QL STRIP.AUTO: ABNORMAL
LYMPHOCYTES # BLD: 1.6 K/UL (ref 0.9–3.6)
LYMPHOCYTES NFR BLD: 20 % (ref 21–52)
MCH RBC QN AUTO: 25.2 PG (ref 24–34)
MCHC RBC AUTO-ENTMCNC: 32.5 G/DL (ref 31–37)
MCV RBC AUTO: 77.4 FL (ref 74–97)
MONOCYTES # BLD: 0.5 K/UL (ref 0.05–1.2)
MONOCYTES NFR BLD: 6 % (ref 3–10)
NEUTS SEG # BLD: 6 K/UL (ref 1.8–8)
NEUTS SEG NFR BLD: 72 % (ref 40–73)
NITRITE UR QL STRIP.AUTO: NEGATIVE
PH UR STRIP: 5 [PH] (ref 5–8)
PLATELET # BLD AUTO: 280 K/UL (ref 135–420)
PMV BLD AUTO: 11.5 FL (ref 9.2–11.8)
POTASSIUM SERPL-SCNC: 4.6 MMOL/L (ref 3.5–5.5)
PROT UR STRIP-MCNC: 300 MG/DL
RBC # BLD AUTO: 5.36 M/UL (ref 4.2–5.3)
RBC #/AREA URNS HPF: ABNORMAL /HPF (ref 0–5)
SODIUM SERPL-SCNC: 140 MMOL/L (ref 136–145)
SP GR UR REFRACTOMETRY: 1.02 (ref 1–1.03)
TSH SERPL DL<=0.05 MIU/L-ACNC: 2.46 UIU/ML (ref 0.36–3.74)
UROBILINOGEN UR QL STRIP.AUTO: 0.2 EU/DL (ref 0.2–1)
VIT B12 SERPL-MCNC: 450 PG/ML (ref 211–911)
WBC # BLD AUTO: 8.3 K/UL (ref 4.6–13.2)
WBC URNS QL MICRO: ABNORMAL /HPF (ref 0–4)

## 2020-03-10 PROCEDURE — 85025 COMPLETE CBC W/AUTO DIFF WBC: CPT

## 2020-03-10 PROCEDURE — 82607 VITAMIN B-12: CPT

## 2020-03-10 PROCEDURE — 86780 TREPONEMA PALLIDUM: CPT

## 2020-03-10 PROCEDURE — 84443 ASSAY THYROID STIM HORMONE: CPT

## 2020-03-10 PROCEDURE — 80048 BASIC METABOLIC PNL TOTAL CA: CPT

## 2020-03-10 PROCEDURE — 81001 URINALYSIS AUTO W/SCOPE: CPT

## 2020-03-10 RX ORDER — AMLODIPINE BESYLATE 5 MG/1
TABLET ORAL
Qty: 180 TAB | Refills: 1 | Status: SHIPPED | OUTPATIENT
Start: 2020-03-10 | End: 2020-09-02

## 2020-03-10 NOTE — PROGRESS NOTES
ROOM # 5 Identified pt with two pt identifiers(name and ). Reviewed record in preparation for visit and have obtained necessary documentation. Chief Complaint Patient presents with  Memory Loss  
  x 1 wk Joseph Pittman preferred language for health care discussion is english/other. Is the patient using any DME equipment during OV? NO Joseph Toya is due for: 
Health Maintenance Due Topic  Medicare Yearly Exam   
 
Health Maintenance reviewed and discussed per provider Please order/place referral if appropriate. Advance Directive: 1. Do you have an advance directive in place? Patient Reply: NO 
 
2. If not, would you like material regarding how to put one in place? NO Coordination of Care: 1. Have you been to the ER, urgent care clinic since your last visit? Hospitalized since your last visit? NO 
 
2. Have you seen or consulted any other health care providers outside of the [address] [place] [address] since your last visit? Include any pap smears or colon screening. NO Patient is accompanied by sister I have received verbal consent from Joseph Toya to discuss any/all medical information while they are present in the room. Learning Assessment: 
Learning Assessment 2014 PRIMARY LEARNER Patient - Guardian Patient HIGHEST LEVEL OF EDUCATION - PRIMARY LEARNER  - DID NOT GRADUATE HIGH SCHOOL - - PRIMARY LANGUAGE ENGLISH - ENGLISH ENGLISH  
LEARNER PREFERENCE PRIMARY READING - DEMONSTRATION LISTENING  
  LISTENING - - -  
  VIDEOS - - -  
ANSWERED BY patient - - pt RELATIONSHIP SELF - LEGAL GUARDIAN SELF Depression Screening: 
3 most recent [facility] Screens 2020 10/17/2019 2019 2019 2018 2018 2018 Little interest or pleasure in doing things Not at all Not at all Not at all Not at all Not at all Not at all Not at all Feeling down, depressed, irritable, or hopeless Not at all Not at all Not at all Not at all Not at all Not at all Not at all Total Score PHQ 2 0 0 0 0 0 0 0 Abuse Screening: 
Abuse Screening Questionnaire 2/28/2019 2/28/2018 11/29/2017 11/21/2016 1/28/2016 Do you ever feel afraid of your partner? N N N (No Data) N Are you in a relationship with someone who physically or mentally threatens you? N N N N N Is it safe for you to go home? Soheila Byrd Fall Risk Fall Risk Assessment, last 12 mths 1/16/2020 10/17/2019 9/17/2019 2/28/2019 11/28/2018 8/29/2018 5/30/2018 Able to walk? Yes Yes Yes Yes Yes Yes Yes Fall in past 12 months?  No No Yes No No No No  
Number of falls in past 12 months - - 1 - - - -

## 2020-03-10 NOTE — PROGRESS NOTES
SUBJECTIVE: 
Felipa Anguiano is a 80y.o. year old female Chief Complaint Patient presents with  Memory Loss  
  x 1 wk History of Present Illness:  
 
About a week ago, her daughter has noticed that she has been having problems finding things, forgetting to take medication, having problem remembering and also having some cognition problems at times. She has history of old stroke with problem ambulating. There is no change in her gait or speech. She has not developed any focal weakness. Her BP has been running good. It is elevated during this visit today because he may have forgotten to take her medications in last few days. She does not c/o any hypotensive symptoms. Her ASHD is controlled. No angina. She has a cardiologist. 
 
She is following ADA diet for DM II. She is not checking her glucose regularly. She is on simvastatin for lipids w/o problem. No Systemic, Cardiovascular or Respiratory symptoms. Past Medical History:  
Diagnosis Date  Arteriosclerotic heart disease (ASHD)  CKD (chronic kidney disease), stage III ([facility] [address] 6/23/2015  CVA (cerebral infarction)  Diabetes ([facility] [address]  Gout 9/23/13 Left Foot  Hypertension  Leg weakness, bilateral 3/18/13 History reviewed. No pertinent surgical history. Current Outpatient Medications Medication Sig  
 hydroCHLOROthiazide (HYDRODIURIL) 12.5 mg tablet take 1/2 tablet by mouth daily  metoprolol tartrate (LOPRESSOR) 50 mg tablet take 1/2 tablet by mouth twice a day  Walker misc Walker for aid in ambulation and preventing falls.  predniSONE (DELTASONE) 20 mg tablet Take two tablets by mouth daily (for gout attack).  amLODIPine (NORVASC) 5 mg tablet take 2 tablets by mouth once daily  clopidogrel (PLAVIX) 75 mg tab take 1 tablet by mouth once daily  simvastatin (ZOCOR) 5 mg tablet take 1 tablet by mouth at bedtime  glucose blood VI test strips (BLOOD GLUCOSE TEST) strip Check glucose once daily. Re: DM .00  Blood-Glucose Meter monitoring kit Check glucose once daily. Re: DM .00  
 meclizine (ANTIVERT) 25 mg tablet Take 25 mg by mouth three (3) times daily.  Lancets misc Check glucose once daily. Re: DM .00  aspirin delayed-release 81 mg tablet Take 81 mg by mouth daily. No current facility-administered medications for this visit. Allergies Allergen Reactions  Valsartan Anaphylaxis Review of Systems:  
Constitutional: No fever, chills, night sweats, malaise. Eyes: Denies any acute complaints. Cardiovascular: No angina, palpitations, PND, orthopnea, lightheadedness, edema, claudication. CKD 3. Respiratory: No dyspnea, wheeze, pleurisy, hemoptysis, unusual cough or sputum. Gastrointestinal: no nausea/ vomiting, bowel habit change, pain, LI symptoms, melena, hematochezia, anorexia. Psychiatric: Memory and cognition problem as above. No agitation, suicidal or homicidal ideation. Genitourinary: CKD 3. Denies any other complaints. Musculoskeletal: History of gout. Bilateral lower extremity weakness since stroke several years ago. No acute complaints. Neurological: History of stroke. No seizures, numbness, speech abnormality, incontinence. Endocrine: Diabetes as above. Overweight. Hematologic: She is following up with oncology for colon cancer. No recurrence. No anemia or blood clotting issues. OBJECTIVE: 
Physical Exam:  
Constitutional: General Appearance:  well developed, overweight, nontoxic, in no acute distress. Visit Vitals /70 (BP 1 Location: Left arm, BP Patient Position: Sitting) Pulse 74 Temp 96.5 °F (35.8 °C) (Oral) Resp 17 Ht 5' 1\" (1.549 m) Wt 149 lb (67.6 kg) SpO2 96% BMI 28.15 kg/m² Eyes[de-identified] Conjunctiva: normal & Lids: normal.  Pupils & Irises: normal size; equal, round and reactive to light. ENT[de-identified] Throat:  clear. Neck Area[de-identified] Neck: without masses, symmetric, trachea is midline. Thyroid:  without significant enlargement, masses or tenderness. Pulmonary: Respiratory effort: normal; no dyspnea, no retractions, no accessory muscle use. Auscultation: normal & symmetrical air exchange; no rales, no rhonchi, no wheeze; no rubs Cardiovascular[de-identified] Palpation of Heart: PMI not displaced or enlarged, no thrills, no heaves. Auscultation of Heart: RRR; G I/VI systolic murmur, no rubs, no gallops. Neck: carotid arteries- normal volume & without bruit; no JVD. Extremities: no edema, no active varicosity. GI[de-identified] Normal bowel sounds. No masses; no tenderness; no rebound/rigidity; no CVA tenderness. No hepatosplenomegaly. Psychiatric: Oriented to time of the day, place and person. Musculoskeletal:  
NC/AT. Neck-supple. Hands: Diffuse degenerative changes of small joints. There is no significant effusion, redness, heat or tenderness. Walking is slow; but unchanged. Muscle strength and tone: Symmetrical. 
 
Neurological[de-identified] DTR: symmetrical.  Speech: stable. Mini Mental State Exam 3/10/2020 What is the Year 0 What is the Season 1 What is the Date 1 What is the Day 0 What is the Month 1 Where are we State 1 Where are we Country 1 Where are we [place] or [facility] 1 Where are we Floor 1 Name three objects, then ask the patient to say them 3 Serial sevens Subtract 7 from 100 in increments 0 Ask for the three objects repeated above 2 Name a pencil 1 Name a watch 1 Have the patient repeat this phrase \"No ifs, ands, or buts\" 1 Three stage command: Take the paper in your right hand 1 Fold the paper in half 1 Put the paper on the floor 1 Read and obey the following: CLOSE YOUR EYES 1 Have the patient write a sentence 1 Have the patient copy a figure 1 Mini Mental Score 22 Lab Results Component Value Date/Time  WBC 7.4 08/23/2017 10:41 AM  
 HGB 13.2 08/23/2017 10:41 AM  
 HCT 40.9 08/23/2017 10:41 AM  
 PLATELET 648 71/66/2404 10:41 AM  
 MCV 78.1 08/23/2017 10:41 AM  
 
 
Lab Results Component Value Date/Time Sodium 135 (L) 10/17/2019 09:45 AM  
 Potassium 4.4 10/17/2019 09:45 AM  
 Chloride 99 (L) 10/17/2019 09:45 AM  
 CO2 27 10/17/2019 09:45 AM  
 Anion gap 9 10/17/2019 09:45 AM  
 Glucose 123 (H) 10/17/2019 09:45 AM  
 BUN 20 (H) 10/17/2019 09:45 AM  
 Creatinine 1.24 10/17/2019 09:45 AM  
 BUN/Creatinine ratio 16 10/17/2019 09:45 AM  
 GFR est AA 49 (L) 10/17/2019 09:45 AM  
 GFR est non-AA 41 (L) 10/17/2019 09:45 AM  
 Calcium 9.3 10/17/2019 09:45 AM  
 Bilirubin, total 0.4 02/28/2019 12:46 PM  
 AST (SGOT) 13 10/17/2019 09:45 AM  
 Alk. phosphatase 72 02/28/2019 12:46 PM  
 Protein, total 7.3 02/28/2019 12:46 PM  
 Albumin 4.0 02/28/2019 12:46 PM  
 Globulin 3.3 02/28/2019 12:46 PM  
 A-G Ratio 1.2 02/28/2019 12:46 PM  
 ALT (SGPT) 15 10/17/2019 09:45 AM  
 
Lab Results Component Value Date/Time Glucose 123 (H) 10/17/2019 09:45 AM  
 Glucose  01/16/2020 10:58 AM  
 
Lab Results Component Value Date/Time TSH 1.90 09/06/2018 10:19 AM  
 
Lab Results Component Value Date/Time Cholesterol, total 127 10/17/2019 09:45 AM  
 HDL Cholesterol 59 10/17/2019 09:45 AM  
 LDL, calculated 47.6 10/17/2019 09:45 AM  
 VLDL, calculated 20.4 10/17/2019 09:45 AM  
 Triglyceride 102 10/17/2019 09:45 AM  
 CHOL/HDL Ratio 2.2 10/17/2019 09:45 AM  
 
Lab Results Component Value Date/Time Hemoglobin A1c 7.0 (H) 10/17/2019 09:45 AM  
 Hemoglobin A1c (POC) 6.4 01/16/2020 10:57 AM  
 
Lab Results Component Value Date/Time Uric acid 7.4 (H) 10/17/2019 09:45 AM  
 
Lab Results Component Value Date/Time TSH 1.90 09/06/2018 10:19 AM  
 
 
ASSESSMENT:  
 
1. Cognitive impairment 2. Memory impairment 3. Essential hypertension 4. Type 2 diabetes with nephropathy (HCC) PLAN:  
 
Pharmacologic Management: Medications reviewed with the patient.   Daughter is advised to help patient with her medications. We will increase antihypertensives. Blood pressure continues to be high. Orders Placed This Encounter  MRI BRAIN WO CONT  TSH 3RD GENERATION  
 RPR  METABOLIC PANEL, BASIC  CBC WITH AUTOMATED DIFF  
 VITAMIN B12  
 URINALYSIS W/ RFLX MICROSCOPIC Daughter is advised that the patient will need help with activities of daily living. Fall precautions. Injury precautions. Discussed DDx, follow-up & work-up. Discussed risk/benefit & side effect of treatment. Follow up visit as scheduled in 10 days, prn sooner. PRN to the emergency room. Will consider referral back to neurology. Low salt ADA diet & graduated excecise. Recommend home BP and glucose monitoring. Health risk from non adherence discussed. Patient/ daughter voiced understanding. 50 minutes were spent on face to face time with this patient for the aforementioned problems, diagnoses and management plans. >20 min of the time was spent counseling and coordinating care. All questions answered with patient's satisfaction. Follow-up and Dispositions · Return in about 9 days (around 3/19/2020).  
  
 
 
Jael Mcclendon MD

## 2020-03-11 LAB — T PALLIDUM AB SER QL IA: NONREACTIVE

## 2020-03-11 NOTE — PROGRESS NOTES
Lab work showed elevated glucose. Otherwise; it does not show the cause of her mental status change. We will continue as planned. Please monitor blood glucose at home.

## 2020-03-16 NOTE — PROGRESS NOTES
Spoke with patient daughter and gave resulted note. I also fax mri order and gave her scheduling number.

## 2020-03-30 ENCOUNTER — VIRTUAL VISIT (OUTPATIENT)
Dept: FAMILY MEDICINE CLINIC | Facility: CLINIC | Age: 85
End: 2020-03-30

## 2020-03-30 DIAGNOSIS — I10 ESSENTIAL HYPERTENSION: ICD-10-CM

## 2020-03-30 DIAGNOSIS — E11.21 TYPE 2 DIABETES WITH NEPHROPATHY (HCC): ICD-10-CM

## 2020-03-30 DIAGNOSIS — F01.50 MULTI-INFARCT DEMENTIA WITHOUT BEHAVIORAL DISTURBANCE (HCC): Primary | ICD-10-CM

## 2020-03-30 RX ORDER — SIMVASTATIN 10 MG/1
10 TABLET, FILM COATED ORAL
Qty: 90 TAB | Refills: 1 | Status: SHIPPED | OUTPATIENT
Start: 2020-03-30 | End: 2020-08-20 | Stop reason: SDUPTHER

## 2020-03-30 RX ORDER — DONEPEZIL HYDROCHLORIDE 5 MG/1
5 TABLET, FILM COATED ORAL
Qty: 30 TAB | Refills: 1 | Status: SHIPPED | OUTPATIENT
Start: 2020-03-30 | End: 2020-04-16

## 2020-03-30 NOTE — PROGRESS NOTES
SUBJECTIVE: 
Frankie Palomares is a 80y.o. year old female Chief Complaint Patient presents with  Memory Loss Follow-up  Hypertension  Diabetes History of Present Illness: The patient is evaluated remotely via audio/ visual.  
Consent: 
Healthcare decision maker is aware that this patient-initiated Telehealth encounter is a billable service, with coverage as determined by her insurance carrier. She is aware that she may receive a bill and has provided verbal consent to proceed: Yes I was at home while conducting this encounter. Time spent with the patient was 25 minutes. She was seen here about 3 weeks ago because of about a 1 week history of change in mental status. Her daughter noticed that she has been having problems finding things, forgetting to take medication, having problem remembering and also having some cognition problems at times. She has history of old stroke with problem ambulating. There is no change in her gait or speech. She has not developed any focal weakness. The work-up from that visit showed MRI with subacute frontal lobe stroke and many chronic infarcts. Per daughter, since it started patient is not significantly better and she wants to have treatment of dementia. Her BP has been running good at home. Her ASHD is controlled. No angina. She has a cardiologist. 
She is following ADA diet for DM II. Not checking her glucose regularly; but her hemoglobin A1c has been in diabetes control range. Jolie Manzano She is on simvastatin 5 mg nightly for lipids w/o problem. No Systemic, Cardiovascular or Respiratory symptoms. Past Medical History:  
Diagnosis Date  Arteriosclerotic heart disease (ASHD)  CKD (chronic kidney disease), stage III ([facility] [address] 6/23/2015  CVA (cerebral infarction)  Diabetes ([facility] [address]  Gout 9/23/13 Left Foot  Hypertension  Leg weakness, bilateral 3/18/13 No past surgical history on file. Current Outpatient Medications Medication Sig  
 amLODIPine (NORVASC) 5 mg tablet take 2 tablets by mouth once daily  hydroCHLOROthiazide (HYDRODIURIL) 12.5 mg tablet take 1/2 tablet by mouth daily  metoprolol tartrate (LOPRESSOR) 50 mg tablet take 1/2 tablet by mouth twice a day  Walker mis Walker for aid in ambulation and preventing falls.  predniSONE (DELTASONE) 20 mg tablet Take two tablets by mouth daily (for gout attack).  clopidogrel (PLAVIX) 75 mg tab take 1 tablet by mouth once daily  simvastatin (ZOCOR) 5 mg tablet take 1 tablet by mouth at bedtime  glucose blood VI test strips (BLOOD GLUCOSE TEST) strip Check glucose once daily. Re: DM .00  Blood-Glucose Meter monitoring kit Check glucose once daily. Re: DM .00  
 meclizine (ANTIVERT) 25 mg tablet Take 25 mg by mouth three (3) times daily.  Lancets misc Check glucose once daily. Re: DM .00  aspirin delayed-release 81 mg tablet Take 81 mg by mouth daily. No current facility-administered medications for this visit. Allergies Allergen Reactions  Valsartan Anaphylaxis Review of Systems:  
Constitutional: No fever, chills, night sweats, malaise. Cardiovascular: No angina, palpitations, PND, orthopnea, lightheadedness, edema, claudication. CKD 3. Respiratory: No dyspnea, wheeze, pleurisy, hemoptysis, unusual cough or sputum. Gastrointestinal: no nausea/ vomiting, bowel habit change, pain, LI symptoms, melena, hematochezia, anorexia. Psychiatric: Memory and cognition problem as above. No agitation, suicidal or homicidal ideation. Neurological: History of stroke. No seizures, numbness, speech abnormality, incontinence. Endocrine: Diabetes as above. OBJECTIVE: 
Physical Exam:  
Constitutional: General Appearance:  in no acute distress. Pulmonary: Respiratory effort: No distress. Psychiatric: Pleasant Lab Results Component Value Date/Time WBC 8.3 03/10/2020 02:26 PM  
 HGB 13.5 03/10/2020 02:26 PM  
 HCT 41.5 03/10/2020 02:26 PM  
 PLATELET 254 21/86/0852 02:26 PM  
 MCV 77.4 03/10/2020 02:26 PM  
 
 
Lab Results Component Value Date/Time Sodium 140 03/10/2020 02:26 PM  
 Potassium 4.6 03/10/2020 02:26 PM  
 Chloride 105 03/10/2020 02:26 PM  
 CO2 26 03/10/2020 02:26 PM  
 Anion gap 9 03/10/2020 02:26 PM  
 Glucose 233 (H) 03/10/2020 02:26 PM  
 BUN 34 (H) 03/10/2020 02:26 PM  
 Creatinine 1.42 (H) 03/10/2020 02:26 PM  
 BUN/Creatinine ratio 24 (H) 03/10/2020 02:26 PM  
 GFR est AA 42 (L) 03/10/2020 02:26 PM  
 GFR est non-AA 35 (L) 03/10/2020 02:26 PM  
 Calcium 9.3 03/10/2020 02:26 PM  
 Bilirubin, total 0.4 02/28/2019 12:46 PM  
 AST (SGOT) 13 10/17/2019 09:45 AM  
 Alk. phosphatase 72 02/28/2019 12:46 PM  
 Protein, total 7.3 02/28/2019 12:46 PM  
 Albumin 4.0 02/28/2019 12:46 PM  
 Globulin 3.3 02/28/2019 12:46 PM  
 A-G Ratio 1.2 02/28/2019 12:46 PM  
 ALT (SGPT) 15 10/17/2019 09:45 AM  
 
 
Lab Results Component Value Date/Time TSH 2.46 03/10/2020 02:26 PM  
 
Lab Results Component Value Date/Time Cholesterol, total 127 10/17/2019 09:45 AM  
 HDL Cholesterol 59 10/17/2019 09:45 AM  
 LDL, calculated 47.6 10/17/2019 09:45 AM  
 VLDL, calculated 20.4 10/17/2019 09:45 AM  
 Triglyceride 102 10/17/2019 09:45 AM  
 CHOL/HDL Ratio 2.2 10/17/2019 09:45 AM  
 
Lab Results Component Value Date/Time Hemoglobin A1c 7.0 (H) 10/17/2019 09:45 AM  
 Hemoglobin A1c (POC) 6.4 01/16/2020 10:57 AM  
 
 
Lab Results Component Value Date/Time TSH 2.46 03/10/2020 02:26 PM  
 
Lab Results Component Value Date/Time Vitamin B12 450 03/10/2020 02:26 PM  
 
MRI of head without contrast 3/20/2020: 
Small subacute stroke in the right lateral frontal lobe with petechial hemorrhagic conversion. Multifocal chronic infarcts in the right frontal lobe and bilateral cerebellum. Additional chronic deep cerebral lacunar infarcts on the left. Moderately extensive amount of chronic microvascular ischemic disease. 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 ASSESSMENT:  
 
1. 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 Essential hypertension 3. Type 2 diabetes with nephropathy (HCC) PLAN:  
 
Pharmacologic Management: Medications reviewed with the patient. Daughter is advised to continue to help patient with her medications. We will increase simvastatin to 10 mg at bedtime. We will start with Aricept 5 mg at bedtime and titrate up to 10 mg. Discussed with daughter that Aricept is widely used; but it is off label use for multi-infarct dementia. Discussed DDx, follow-up & work-up. Discussed risk/benefit & side effect of treatment. Daughter to consider referral back to neurology. Low salt ADA diet & graduated excecise. Recommend home BP and glucose monitoring. Health risk from non adherence discussed. Patient/ daughter voiced understanding. Follow-up and Dispositions · Return in about 1 month (around 4/30/2020) for Virtual visit.  
  
 
 
Konstantin Mccollum MD

## 2020-04-03 DIAGNOSIS — R41.89 COGNITIVE IMPAIRMENT: ICD-10-CM

## 2020-04-03 DIAGNOSIS — R41.3 MEMORY IMPAIRMENT: ICD-10-CM

## 2020-04-16 ENCOUNTER — VIRTUAL VISIT (OUTPATIENT)
Dept: FAMILY MEDICINE CLINIC | Facility: CLINIC | Age: 85
End: 2020-04-16

## 2020-04-16 DIAGNOSIS — F01.50 MULTI-INFARCT DEMENTIA WITHOUT BEHAVIORAL DISTURBANCE (HCC): Primary | ICD-10-CM

## 2020-04-16 DIAGNOSIS — Z86.73 HISTORY OF MULTIPLE STROKES: ICD-10-CM

## 2020-04-16 DIAGNOSIS — E11.21 TYPE 2 DIABETES WITH NEPHROPATHY (HCC): ICD-10-CM

## 2020-04-16 DIAGNOSIS — I10 ESSENTIAL HYPERTENSION: ICD-10-CM

## 2020-04-16 RX ORDER — DONEPEZIL HYDROCHLORIDE 10 MG/1
10 TABLET, FILM COATED ORAL
Qty: 90 TAB | Refills: 0 | Status: SHIPPED | OUTPATIENT
Start: 2020-04-16 | End: 2020-08-20

## 2020-04-16 NOTE — PROGRESS NOTES
Consent:  
Eddy Mcardle, who was seen by synchronous (real-time) audio-video technology, and/or her healthcare decision maker, is aware that this patient-initiated, Telehealth encounter on 4/16/2020 is a billable service, with coverage as determined by her insurance carrier. She is aware that she may receive a bill and has provided verbal consent to proceed: NA - Consent obtained within past 12 months. SUBJECTIVE: 
Eddy Mcardle is a 80y.o. year old female Chief Complaint Patient presents with  Memory Loss  Neurologic Problem Stroke History of Present Illness: She was seen here about 5 weeks ago because of about a 1 week history of change in mental status. She was having problems finding things, forgetting to take medication, having problem remembering and also having some cognition problems at times. She has history of old stroke with problem ambulating. There is no change in her gait or speech. She has not developed any focal weakness. The work-up from that visit showed MRI with subacute frontal lobe stroke and many chronic infarcts. In agreement with her daughter, the patient was started on Aricept 5 mg. She is tolerating well and her daughter is noticing some improvement in her memory. Since she was already on ASA and Plavix, her Simvastatin was also increased for stroke prevention. She is tolerating it well. Her BP has been running good at home. Her ASHD is controlled. No angina. She has a cardiologist. 
She is following ADA diet for DM II. Not checking her glucose regularly; but her hemoglobin A1c has been in diabetes control range. No Systemic, Cardiovascular or Respiratory symptoms. Past Medical History:  
Diagnosis Date  Arteriosclerotic heart disease (ASHD)  CKD (chronic kidney disease), stage III ([address] 6/23/2015  CVA (cerebral infarction)  Diabetes ([address]  Gout 9/23/13 Left Foot  Hypertension  Leg weakness, bilateral 3/18/13 No past surgical history on file. Current Outpatient Medications Medication Sig  
 donepeziL (ARICEPT) 5 mg tablet Take 1 Tab by mouth nightly.  simvastatin (ZOCOR) 10 mg tablet Take 1 Tab by mouth nightly.  amLODIPine (NORVASC) 5 mg tablet take 2 tablets by mouth once daily  hydroCHLOROthiazide (HYDRODIURIL) 12.5 mg tablet take 1/2 tablet by mouth daily  metoprolol tartrate (LOPRESSOR) 50 mg tablet take 1/2 tablet by mouth twice a day  Walker [facility] Walker for aid in ambulation and preventing falls.  clopidogrel (PLAVIX) 75 mg tab take 1 tablet by mouth once daily  glucose blood VI test strips (BLOOD GLUCOSE TEST) strip Check glucose once daily. Re: DM .00  Blood-Glucose Meter monitoring kit Check glucose once daily. Re: DM .00  
 meclizine (ANTIVERT) 25 mg tablet Take 25 mg by mouth three (3) times daily.  Lancets misc Check glucose once daily. Re: DM .00  aspirin delayed-release 81 mg tablet Take 81 mg by mouth daily. No current facility-administered medications for this visit. Allergies Allergen Reactions  Valsartan Anaphylaxis Review of Systems:  
Constitutional: No fever, chills, night sweats, malaise. Cardiovascular: No angina, palpitations, PND, orthopnea, lightheadedness, edema, claudication. CKD 3. Respiratory: No dyspnea, wheeze, pleurisy, hemoptysis, unusual cough or sputum. Gastrointestinal: no nausea/ vomiting, bowel habit change, pain, LI symptoms, melena, hematochezia, anorexia. Psychiatric: Memory and cognition problem as above. No agitation, suicidal or homicidal ideation. Neurological: History of stroke. No seizures, numbness, acute speech abnormality, incontinence. Endocrine: Diabetes as above. OBJECTIVE: 
Physical Exam:  
 
Constitutional: General Appearance:  in no acute distress. Pulmonary: Respiratory effort: No distress. Psychiatric: Pleasant. Alert and responsive. Neurological: speech-slow; but stable. Musculoskeletal[de-identified] Head & Neck: + neck movement. Lab Results Component Value Date/Time WBC 8.3 03/10/2020 02:26 PM  
 HGB 13.5 03/10/2020 02:26 PM  
 HCT 41.5 03/10/2020 02:26 PM  
 PLATELET 347 54/09/5027 02:26 PM  
 MCV 77.4 03/10/2020 02:26 PM  
 
 
Lab Results Component Value Date/Time Sodium 140 03/10/2020 02:26 PM  
 Potassium 4.6 03/10/2020 02:26 PM  
 Chloride 105 03/10/2020 02:26 PM  
 CO2 26 03/10/2020 02:26 PM  
 Anion gap 9 03/10/2020 02:26 PM  
 Glucose 233 (H) 03/10/2020 02:26 PM  
 BUN 34 (H) 03/10/2020 02:26 PM  
 Creatinine 1.42 (H) 03/10/2020 02:26 PM  
 BUN/Creatinine ratio 24 (H) 03/10/2020 02:26 PM  
 GFR est AA 42 (L) 03/10/2020 02:26 PM  
 GFR est non-AA 35 (L) 03/10/2020 02:26 PM  
 Calcium 9.3 03/10/2020 02:26 PM  
 Bilirubin, total 0.4 02/28/2019 12:46 PM  
 AST (SGOT) 13 10/17/2019 09:45 AM  
 Alk. phosphatase 72 02/28/2019 12:46 PM  
 Protein, total 7.3 02/28/2019 12:46 PM  
 Albumin 4.0 02/28/2019 12:46 PM  
 Globulin 3.3 02/28/2019 12:46 PM  
 A-G Ratio 1.2 02/28/2019 12:46 PM  
 ALT (SGPT) 15 10/17/2019 09:45 AM  
 
 
Lab Results Component Value Date/Time TSH 2.46 03/10/2020 02:26 PM  
 
Lab Results Component Value Date/Time Cholesterol, total 127 10/17/2019 09:45 AM  
 HDL Cholesterol 59 10/17/2019 09:45 AM  
 LDL, calculated 47.6 10/17/2019 09:45 AM  
 VLDL, calculated 20.4 10/17/2019 09:45 AM  
 Triglyceride 102 10/17/2019 09:45 AM  
 CHOL/HDL Ratio 2.2 10/17/2019 09:45 AM  
 
Lab Results Component Value Date/Time Hemoglobin A1c 7.0 (H) 10/17/2019 09:45 AM  
 Hemoglobin A1c (POC) 6.4 01/16/2020 10:57 AM  
 
 
Lab Results Component Value Date/Time TSH 2.46 03/10/2020 02:26 PM  
 
Lab Results Component Value Date/Time Vitamin B12 450 03/10/2020 02:26 PM  
 
MRI of head without contrast 3/20/2020: Small subacute stroke in the right lateral frontal lobe with petechial hemorrhagic conversion. Multifocal chronic infarcts in the right frontal lobe and bilateral cerebellum. Additional chronic deep cerebral lacunar infarcts on the left. Moderately extensive amount of chronic microvascular ischemic disease. 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 ASSESSMENT:  
 
1. 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 Essential hypertension 3. Type 2 diabetes with nephropathy (HCC) 4. History of multiple strokes PLAN:  
 
Pharmacologic Management: Medications reviewed with the patient. Daughter is advised to continue to help patient with her medications. After 2-3 weeks, we will increase Aricept to 10 mg at bedtime as tolerated. Orders Placed This Encounter  donepeziL (ARICEPT) 10 mg tablet Discussed DDx, follow-up & work-up. Discussed risk/benefit & side effect of treatment. Low salt ADA diet & graduated excecise. Recommend home BP and glucose monitoring. Health risk from non adherence discussed. Patient/ daughter voiced understanding. Follow-up and Dispositions · Return in about 6 weeks (around 5/28/2020). Chris Robles is a 80 y.o. female who was evaluated by a video visit encounter for concerns as above. A caregiver was present when appropriate. Due to this being a TeleHealth encounter (During [facility] public health emergency), evaluation of the following organ systems was limited: Vitals/Constitutional/EENT/Resp/CV/GI//MS/Neuro/Skin/Heme-Lymph-Imm. Pursuant to the emergency declaration under the [facility], [facility]5 waiver authority and the [employer] Act, this Virtual  Visit was conducted, with patient's (and/or legal guardian's) consent, to reduce the patient's risk of exposure to COVID-19 and provide necessary medical care. Services were provided through a video synchronous discussion virtually to substitute for in-person clinic visit. Patient and provider were located at their individual homes.  
 
 
Kourtney Keyes MD

## 2020-04-28 RX ORDER — CLOPIDOGREL BISULFATE 75 MG/1
TABLET ORAL
Qty: 90 TAB | Refills: 1 | Status: SHIPPED | OUTPATIENT
Start: 2020-04-28 | End: 2020-11-16

## 2020-05-28 ENCOUNTER — VIRTUAL VISIT (OUTPATIENT)
Dept: FAMILY MEDICINE CLINIC | Facility: CLINIC | Age: 85
End: 2020-05-28

## 2020-05-28 DIAGNOSIS — E11.21 TYPE 2 DIABETES WITH NEPHROPATHY (HCC): ICD-10-CM

## 2020-05-28 DIAGNOSIS — F01.50 MULTI-INFARCT DEMENTIA WITHOUT BEHAVIORAL DISTURBANCE (HCC): Primary | ICD-10-CM

## 2020-05-28 DIAGNOSIS — I10 ESSENTIAL HYPERTENSION: ICD-10-CM

## 2020-05-28 NOTE — PROGRESS NOTES
Consent:  
Jeanna Villafuerte, who was seen by synchronous (real-time) audio-video technology, and/or her healthcare decision maker, is aware that this patient-initiated, Telehealth encounter on 4/16/2020 is a billable service, with coverage as determined by her insurance carrier. She is aware that she may receive a bill and has provided verbal consent to proceed: NA - Consent obtained within past 12 months. SUBJECTIVE: 
Jeanna Villafuerte is a [address] year old female Chief Complaint Patient presents with  Dementia  Neurologic Problem Stroke follow-up History of Present Illness: She was seen here 2 months ago because of about a 1 week history of change in mental status. She was having problems finding things, forgetting to take medication, having problem remembering and also having some cognition problems at times. She has history of old stroke with problem ambulating. There is no change in her gait or speech. She has not developed any focal weakness. The work-up showed MRI with subacute frontal lobe stroke and many chronic infarcts. In agreement with her daughter, the patient was started on Aricept 5 mg. It was increased to 10 mg a month ago. She is tolerating well and there has been improvement in that mental status. However blood pressure has been running good on current regimen. To help with stroke prevention her simvastatin was increased and she has been tolerating it without any problem. Her ASHD is controlled. No angina. She has a an appointment with her cardiologist within next month. She is following ADA diet for DM II. Not checking her glucose regularly; but her hemoglobin A1c has been in diabetes control range. No Systemic, Cardiovascular or Respiratory symptoms. Past Medical History:  
Diagnosis Date  Arteriosclerotic heart disease (ASHD)  CKD (chronic kidney disease), stage III ([address] 6/23/2015  CVA (cerebral infarction)  Diabetes ([address]  Gout 9/23/13 Left Foot  Hypertension  Leg weakness, bilateral 3/18/13 No past surgical history on file. Current Outpatient Medications Medication Sig  clopidogreL (PLAVIX) 75 mg tab take 1 tablet by mouth once daily  donepeziL (ARICEPT) 10 mg tablet Take 1 Tab by mouth nightly.  simvastatin (ZOCOR) 10 mg tablet Take 1 Tab by mouth nightly.  amLODIPine (NORVASC) 5 mg tablet take 2 tablets by mouth once daily  hydroCHLOROthiazide (HYDRODIURIL) 12.5 mg tablet take 1/2 tablet by mouth daily  metoprolol tartrate (LOPRESSOR) 50 mg tablet take 1/2 tablet by mouth twice a day  Walker misc Walker for aid in ambulation and preventing falls.  glucose blood VI test strips (BLOOD GLUCOSE TEST) strip Check glucose once daily. Re: DM .00  Blood-Glucose Meter monitoring kit Check glucose once daily. Re: DM .00  
 meclizine (ANTIVERT) 25 mg tablet Take 25 mg by mouth three (3) times daily.  Lancets misc Check glucose once daily. Re: DM .00  aspirin delayed-release 81 mg tablet Take 81 mg by mouth daily. No current facility-administered medications for this visit. Allergies Allergen Reactions  Valsartan Anaphylaxis Review of Systems:  
Constitutional: No fever, chills, night sweats, malaise. Cardiovascular: No angina, palpitations, PND, orthopnea, lightheadedness, edema, claudication. Nephrology: Stable CKD 3. Respiratory: No dyspnea, wheeze, pleurisy, hemoptysis, unusual cough or sputum. Gastrointestinal: no nausea/ vomiting, bowel habit change, pain, LI symptoms, melena, hematochezia, anorexia. Psychiatric: Memory and cognition problem as above. No agitation, suicidal or homicidal ideation. Neurological: History of multiple strokes. No seizures, numbness, acute speech abnormality, incontinence. Endocrine: Diabetes as above. OBJECTIVE: 
Physical Exam:  
 
Constitutional: General Appearance:  in no acute distress. Pulmonary: Respiratory effort: No distress. Psychiatric: Pleasant. Alert and oriented to place and person Neurological: speech-slow; but stable. Musculoskeletal[de-identified] Neck supple Lab Results Component Value Date/Time WBC 8.3 03/10/2020 02:26 PM  
 HGB 13.5 03/10/2020 02:26 PM  
 HCT 41.5 03/10/2020 02:26 PM  
 PLATELET 713 31/49/7262 02:26 PM  
 MCV 77.4 03/10/2020 02:26 PM  
 
 
Lab Results Component Value Date/Time Sodium 140 03/10/2020 02:26 PM  
 Potassium 4.6 03/10/2020 02:26 PM  
 Chloride 105 03/10/2020 02:26 PM  
 CO2 26 03/10/2020 02:26 PM  
 Anion gap 9 03/10/2020 02:26 PM  
 Glucose 233 (H) 03/10/2020 02:26 PM  
 BUN 34 (H) 03/10/2020 02:26 PM  
 Creatinine 1.42 (H) 03/10/2020 02:26 PM  
 BUN/Creatinine ratio 24 (H) 03/10/2020 02:26 PM  
 GFR est AA 42 (L) 03/10/2020 02:26 PM  
 GFR est non-AA 35 (L) 03/10/2020 02:26 PM  
 Calcium 9.3 03/10/2020 02:26 PM  
 Bilirubin, total 0.4 02/28/2019 12:46 PM  
 Alk. phosphatase 72 02/28/2019 12:46 PM  
 Protein, total 7.3 02/28/2019 12:46 PM  
 Albumin 4.0 02/28/2019 12:46 PM  
 Globulin 3.3 02/28/2019 12:46 PM  
 A-G Ratio 1.2 02/28/2019 12:46 PM  
 ALT (SGPT) 15 10/17/2019 09:45 AM  
 
 
Lab Results Component Value Date/Time TSH 2.46 03/10/2020 02:26 PM  
 
Lab Results Component Value Date/Time Cholesterol, total 127 10/17/2019 09:45 AM  
 HDL Cholesterol 59 10/17/2019 09:45 AM  
 LDL, calculated 47.6 10/17/2019 09:45 AM  
 VLDL, calculated 20.4 10/17/2019 09:45 AM  
 Triglyceride 102 10/17/2019 09:45 AM  
 CHOL/HDL Ratio 2.2 10/17/2019 09:45 AM  
 
Lab Results Component Value Date/Time Hemoglobin A1c 7.0 (H) 10/17/2019 09:45 AM  
 Hemoglobin A1c (POC) 6.4 01/16/2020 10:57 AM  
 
 
Lab Results Component Value Date/Time TSH 2.46 03/10/2020 02:26 PM  
 
Lab Results Component Value Date/Time Vitamin B12 450 03/10/2020 02:26 PM  
 
MRI of head without contrast 3/20/2020: Small subacute stroke in the right lateral frontal lobe with petechial hemorrhagic conversion. Multifocal chronic infarcts in the right frontal lobe and bilateral cerebellum. Additional chronic deep cerebral lacunar infarcts on the left. Moderately extensive amount of chronic microvascular ischemic disease. 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 ASSESSMENT:  
 
1. 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 Essential hypertension 3. Type 2 diabetes with nephropathy (HCC) PLAN:  
 
Pharmacologic Management: Medications reviewed with the patient. Continue with current treatment. Discussed DDx, follow-up & work-up. Discussed risk/benefit & side effect of treatment. Low salt ADA diet & graduated excecise. Recommend home BP and glucose monitoring. Health risk from non adherence discussed. Patient/ daughter voiced understanding. Eddy Mcardle is a 80 y.o. female who was evaluated by a video visit encounter for concerns as above. A caregiver was present when appropriate. Due to this being a TeleHealth encounter ([facility]), evaluation of the following organ systems was limited: Vitals/Constitutional/EENT/Resp/CV/GI//MS/Neuro/Skin/Heme-Lymph-Imm. Pursuant to the emergency declaration under the [facility] waiver authority and the [employer] and [employer] Act, this Virtual  Visit was conducted, with patient's (and/or legal guardian's) consent, to reduce the patient's risk of exposure to COVID-19 and provide necessary medical care. Services were provided through a video synchronous discussion virtually to substitute for in-person clinic visit. Patient and provider were located at their individual homes.  
 
 
Kanika Maurer MD

## 2020-07-02 ENCOUNTER — TELEPHONE (OUTPATIENT)
Dept: FAMILY MEDICINE CLINIC | Facility: CLINIC | Age: 85
End: 2020-07-02

## 2020-07-09 ENCOUNTER — VIRTUAL VISIT (OUTPATIENT)
Dept: FAMILY MEDICINE CLINIC | Facility: CLINIC | Age: 85
End: 2020-07-09

## 2020-07-09 DIAGNOSIS — I25.10 ASHD (ARTERIOSCLEROTIC HEART DISEASE): ICD-10-CM

## 2020-07-09 DIAGNOSIS — I10 ESSENTIAL HYPERTENSION: Primary | ICD-10-CM

## 2020-07-09 DIAGNOSIS — E11.21 TYPE 2 DIABETES WITH NEPHROPATHY (HCC): ICD-10-CM

## 2020-07-09 DIAGNOSIS — F01.50 MULTI-INFARCT DEMENTIA WITHOUT BEHAVIORAL DISTURBANCE (HCC): ICD-10-CM

## 2020-07-09 NOTE — PROGRESS NOTES
Dorothy Bermudez is a 80 y.o. female evaluated via telephone on 7/9/2020. Consent: 
She and/or health care decision maker is aware that that she may receive a bill for this telephone service, depending on her insurance coverage, and has provided verbal consent to proceed: Yes SUBJECTIVE: 
Dorothy Bermudez is a 80y.o. year old female Chief Complaint Patient presents with  Dementia  Diabetes  Hypertension History of Present Illness: In march 2020, she started having problems finding things, forgetting to take medication, having problem remembering and also having some cognition problems at times. She has history of old stroke with problem ambulating. There is no change in her gait or speech. She has not developed any focal weakness. The work-up showed MRI with subacute frontal lobe stroke and many chronic infarcts. In agreement with her daughter, the patient was started on Aricept 5 mg that was increased to 10 mg a month later. She is tolerating well and there has been improvement in that mental status. Her blood pressure has been running good on current regimen. To help with stroke prevention her simvastatin was increased and she has been tolerating it without any problem. Her ASHD is controlled. No angina. She is also seeing cardiologist. 
 
She is following ADA diet for DM II. Not checking her glucose regularly; but her hemoglobin A1c has been in diabetes control range. No Systemic, Cardiovascular or Respiratory symptoms. Past Medical History:  
Diagnosis Date  Arteriosclerotic heart disease (ASHD)  CKD (chronic kidney disease), stage III ([address] 6/23/2015  CVA (cerebral infarction)  Diabetes ([facility]  Gout 9/23/13 Left Foot  Hypertension  Leg weakness, bilateral 3/18/13 No past surgical history on file. Current Outpatient Medications Medication Sig  clopidogreL (PLAVIX) 75 mg tab take 1 tablet by mouth once daily  donepeziL (ARICEPT) 10 mg tablet Take 1 Tab by mouth nightly.  simvastatin (ZOCOR) 10 mg tablet Take 1 Tab by mouth nightly.  amLODIPine (NORVASC) 5 mg tablet take 2 tablets by mouth once daily  hydroCHLOROthiazide (HYDRODIURIL) 12.5 mg tablet take 1/2 tablet by mouth daily  metoprolol tartrate (LOPRESSOR) 50 mg tablet take 1/2 tablet by mouth twice a day  Walker [facility] Walker for aid in ambulation and preventing falls.  glucose blood VI test strips (BLOOD GLUCOSE TEST) strip Check glucose once daily. Re: DM .00  Blood-Glucose Meter monitoring kit Check glucose once daily. Re: DM .00  
 meclizine (ANTIVERT) 25 mg tablet Take 25 mg by mouth three (3) times daily.  Lancets misc Check glucose once daily. Re: DM .00  aspirin delayed-release 81 mg tablet Take 81 mg by mouth daily. No current facility-administered medications for this visit. Allergies Allergen Reactions  Valsartan Anaphylaxis Review of Systems:  
Constitutional: No fever, chills, night sweats, malaise. Cardiovascular: No angina, palpitations, PND, orthopnea, lightheadedness, edema, claudication. Nephrology: Stable CKD 3. Respiratory: No dyspnea, wheeze, pleurisy, hemoptysis, unusual cough or sputum. Gastrointestinal: no nausea/ vomiting, bowel habit change, pain, LI symptoms, melena, hematochezia, anorexia. Psychiatric: Memory and cognition problem as above. No agitation, suicidal or homicidal ideation. Neurological: History of multiple strokes. No seizures, numbness, acute speech abnormality, incontinence. Endocrine: Diabetes as above. OBJECTIVE: 
Physical Exam:  
 
Constitutional: in no acute distress. Pulmonary: no dyspnea. Psychiatric[de-identified] Pleasant and cooperative. Oriented to time, place and person. Neurological[de-identified] Speech stable Lab Results Component Value Date/Time  WBC 8.3 03/10/2020 02:26 PM  
 HGB 13.5 03/10/2020 02:26 PM  
 HCT 41.5 03/10/2020 02:26 PM  
 PLATELET 804 95/88/4895 02:26 PM  
 MCV 77.4 03/10/2020 02:26 PM  
 
 
Lab Results Component Value Date/Time Sodium 140 03/10/2020 02:26 PM  
 Potassium 4.6 03/10/2020 02:26 PM  
 Chloride 105 03/10/2020 02:26 PM  
 CO2 26 03/10/2020 02:26 PM  
 Anion gap 9 03/10/2020 02:26 PM  
 Glucose 233 (H) 03/10/2020 02:26 PM  
 BUN 34 (H) 03/10/2020 02:26 PM  
 Creatinine 1.42 (H) 03/10/2020 02:26 PM  
 BUN/Creatinine ratio 24 (H) 03/10/2020 02:26 PM  
 GFR est AA 42 (L) 03/10/2020 02:26 PM  
 GFR est non-AA 35 (L) 03/10/2020 02:26 PM  
 Calcium 9.3 03/10/2020 02:26 PM  
 Bilirubin, total 0.4 02/28/2019 12:46 PM  
 Alk. phosphatase 72 02/28/2019 12:46 PM  
 Protein, total 7.3 02/28/2019 12:46 PM  
 Albumin 4.0 02/28/2019 12:46 PM  
 Globulin 3.3 02/28/2019 12:46 PM  
 A-G Ratio 1.2 02/28/2019 12:46 PM  
 ALT (SGPT) 15 10/17/2019 09:45 AM  
 
 
Lab Results Component Value Date/Time TSH 2.46 03/10/2020 02:26 PM  
 
Lab Results Component Value Date/Time Cholesterol, total 127 10/17/2019 09:45 AM  
 HDL Cholesterol 59 10/17/2019 09:45 AM  
 LDL, calculated 47.6 10/17/2019 09:45 AM  
 VLDL, calculated 20.4 10/17/2019 09:45 AM  
 Triglyceride 102 10/17/2019 09:45 AM  
 CHOL/HDL Ratio 2.2 10/17/2019 09:45 AM  
 
Lab Results Component Value Date/Time Hemoglobin A1c 7.0 (H) 10/17/2019 09:45 AM  
 Hemoglobin A1c (POC) 6.4 01/16/2020 10:57 AM  
 
 
Lab Results Component Value Date/Time TSH 2.46 03/10/2020 02:26 PM  
 
Lab Results Component Value Date/Time Vitamin B12 450 03/10/2020 02:26 PM  
 
MRI of head without contrast 3/20/2020: 
Small subacute stroke in the right lateral frontal lobe with petechial hemorrhagic conversion. Multifocal chronic infarcts in the right frontal lobe and bilateral cerebellum. Additional chronic deep cerebral lacunar infarcts on the left. Moderately extensive amount of chronic microvascular ischemic disease. 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 ASSESSMENT:  
 
1. Essential hypertension 2. Type 2 diabetes with nephropathy (HCC) 3. 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 ASHD (arteriosclerotic heart disease) PLAN:  
 
Pharmacologic Management: Medications reviewed with the patient. Continue with current treatment. Discussed DDx, follow-up & work-up. Discussed risk/benefit & side effect of treatment. Low salt ADA diet & graduated excecise. Recommend home BP and glucose monitoring. Health risk from non adherence discussed. Patient/ daughter voiced understanding. Follow-up and Dispositions · Return in about 2 months (around 9/9/2020). Reagan Alvarez is a 80 y.o. female evaluated via telephone on 7/9/2020. I affirm that this is a Patient Initiated Episode with an Established Patient who has not had a related appointment within my department in the past 7 days or scheduled within the next 24 hours. Total Time Spent: 15 minutes Kristy Potter MD

## 2020-07-09 NOTE — TELEPHONE ENCOUNTER
Called pt and left message. Call back number left and I myself or one of the other nurses will attempt to contact again. The call was to inform pt questions and concerns  .

## 2020-08-10 ENCOUNTER — TELEPHONE (OUTPATIENT)
Dept: FAMILY MEDICINE CLINIC | Facility: CLINIC | Age: 85
End: 2020-08-10

## 2020-08-10 NOTE — TELEPHONE ENCOUNTER
pts daughter Michell Orozco called wanting to speak to nurse regarding some \"odd behavior\" that patient has been doing.  Please advise

## 2020-08-11 NOTE — TELEPHONE ENCOUNTER
Called pt and left message. Call back number left and I myself or one of the other nurses will attempt to contact again. The call was to inform pt advice only  .

## 2020-08-20 RX ORDER — SIMVASTATIN 10 MG/1
10 TABLET, FILM COATED ORAL
Qty: 90 TAB | Refills: 1 | Status: SHIPPED | OUTPATIENT
Start: 2020-08-20 | End: 2021-01-01

## 2020-08-20 NOTE — TELEPHONE ENCOUNTER
Requested Prescriptions     Pending Prescriptions Disp Refills    simvastatin (ZOCOR) 10 mg tablet 90 Tab 1     Sig: Take 1 Tab by mouth nightly.      Future Appointments   Date Time Provider Sean Hanna   9/9/2020 10:00 AM Maylin Berumen MD [address] [place] [address]

## 2020-09-02 RX ORDER — AMLODIPINE BESYLATE 5 MG/1
TABLET ORAL
Qty: 180 TAB | Refills: 1 | Status: SHIPPED | OUTPATIENT
Start: 2020-09-02 | End: 2021-01-01

## 2020-09-09 ENCOUNTER — HOME HEALTH ADMISSION (OUTPATIENT)
Dept: HOME HEALTH SERVICES | Facility: HOME HEALTH | Age: 85
End: 2020-09-09

## 2020-09-09 ENCOUNTER — VIRTUAL VISIT (OUTPATIENT)
Dept: FAMILY MEDICINE CLINIC | Facility: CLINIC | Age: 85
End: 2020-09-09

## 2020-09-09 DIAGNOSIS — R54 SENILE DEBILITY: ICD-10-CM

## 2020-09-09 DIAGNOSIS — E11.21 TYPE 2 DIABETES WITH NEPHROPATHY (HCC): ICD-10-CM

## 2020-09-09 DIAGNOSIS — Z71.89 ACP (ADVANCE CARE PLANNING): ICD-10-CM

## 2020-09-09 DIAGNOSIS — I10 ESSENTIAL HYPERTENSION: ICD-10-CM

## 2020-09-09 DIAGNOSIS — F01.518 MULTI-INFARCT DEMENTIA WITH BEHAVIOR DISTURBANCE: ICD-10-CM

## 2020-09-09 DIAGNOSIS — E78.5 HYPERLIPIDEMIA, UNSPECIFIED HYPERLIPIDEMIA TYPE: ICD-10-CM

## 2020-09-09 DIAGNOSIS — Z00.00 MEDICARE ANNUAL WELLNESS VISIT, SUBSEQUENT: Primary | ICD-10-CM

## 2020-09-09 NOTE — PATIENT INSTRUCTIONS
Medicare Wellness Visit, Female The best way to live healthy is to have a lifestyle where you eat a well-balanced diet, exercise regularly, limit alcohol use, and quit all forms of tobacco/nicotine, if applicable. Regular preventive services are another way to keep healthy. Preventive services (vaccines, screening tests, monitoring & exams) can help personalize your care plan, which helps you manage your own care. Screening tests can find health problems at the earliest stages, when they are easiest to treat. [facility] follows the current, evidence-based guidelines published by the [facility] Shila ([facility]) when recommending preventive services for our patients. Because we follow these guidelines, sometimes recommendations change over time as research supports it. (For example, mammograms used to be recommended annually. Even though Medicare will still pay for an annual mammogram, the newer guidelines recommend a mammogram every two years for women of average risk.) Of course, you and your doctor may decide to screen more often for some diseases, based on your risk and your health status. Preventive services for you include: - Medicare offers their members a free annual wellness visit, which is time for you and your primary care provider to discuss and plan for your preventive service needs. Take advantage of this benefit every year! 
-All adults over the age of 72 should receive the recommended pneumonia vaccines. Current USPSTF guidelines recommend a series of two vaccines for the best pneumonia protection.  
-All adults should have a flu vaccine yearly and a tetanus vaccine every 10 years. All adults age 61 and older should receive a shingles vaccine once in their lifetime.   
-A bone mass density test is recommended when a woman turns 65 to screen for osteoporosis. This test is only recommended one time, as a screening. Some providers will use this same test as a disease monitoring tool if you already have osteoporosis. -All adults age 38-68 who are overweight should have a diabetes screening test once every three years.  
-Other screening tests and preventive services for persons with diabetes include: an eye exam to screen for diabetic retinopathy, a kidney function test, a foot exam, and stricter control over your cholesterol.  
-Cardiovascular screening for adults with routine risk involves an electrocardiogram (ECG) at intervals determined by your doctor.  
-Colorectal cancer screenings should be done for adults age 54-65 with no increased risk factors for colorectal cancer. There are a number of acceptable methods of screening for this type of cancer. Each test has its own benefits and drawbacks. Discuss with your doctor what is most appropriate for you during your annual wellness visit. The different tests include: colonoscopy (considered the best screening method), a fecal occult blood test, a fecal DNA test, and sigmoidoscopy. -Breast cancer screenings are recommended every other year for women of normal risk, age 54-69. 
-Cervical cancer screenings for women over age 72 are only recommended with certain risk factors.  
-All adults born between [facility] should be screened once for Hepatitis C. Here is a list of your current Health Maintenance items (your personalized list of preventive services) with a due date: 
Health Maintenance Due Topic Date Due  Shingles Vaccine (1 of 2) 05/25/1980 [facility]  02/29/2020  Yearly Flu Vaccine (1) 09/01/2020  Diabetic Foot Care  09/17/2020 Preventing Falls: Care Instructions Your Care Instructions Getting around your home safely can be a challenge if you have injuries or health problems that make it easy for you to fall.  Loose rugs and furniture in walkways are among the dangers for many older people who have problems walking or who have poor eyesight. People who have conditions such as arthritis, osteoporosis, or dementia also have to be careful not to fall. You can make your home safer with a few simple measures. Follow-up care is a key part of your treatment and safety. Be sure to make and go to all appointments, and call your doctor if you are having problems. It's also a good idea to know your test results and keep a list of the medicines you take. How can you care for yourself at home? Taking care of yourself · You may get dizzy if you do not drink enough water. To prevent dehydration, drink plenty of fluids, enough so that your urine is light yellow or clear like water. Choose water and other caffeine-free clear liquids. If you have kidney, heart, or liver disease and have to limit fluids, talk with your doctor before you increase the amount of fluids you drink. · Exercise regularly to improve your strength, muscle tone, and balance. Walk if you can. Swimming may be a good choice if you cannot walk easily. · Have your vision and hearing checked each year or any time you notice a change. If you have trouble seeing and hearing, you might not be able to avoid objects and could lose your balance. · Know the side effects of the medicines you take. Ask your doctor or pharmacist whether the medicines you take can affect your balance. Sleeping pills or sedatives can affect your balance. · Limit the amount of alcohol you drink. Alcohol can impair your balance and other senses. · Ask your doctor whether calluses or corns on your feet need to be removed. If you wear loose-fitting shoes because of calluses or corns, you can lose your balance and fall. · Talk to your doctor if you have numbness in your feet. Preventing falls at home · Remove raised doorway thresholds, throw rugs, and clutter. Repair loose carpet or raised areas in the floor. · Move furniture and electrical cords to keep them out of walking paths. · Use nonskid floor wax, and wipe up spills right away, especially on ceramic tile floors. · If you use a walker or cane, put rubber tips on it. If you use crutches, clean the bottoms of them regularly with an abrasive pad, such as steel wool. · Keep your house well lit, especially [facility], and outside walkways. Use night-lights in areas such as hallways and bathrooms. Add extra light switches or use remote switches (such as switches that go on or off when you clap your hands) to make it easier to turn lights on if you have to get up during the night. · Install sturdy handrails on stairways. · Move items in your cabinets so that the things you use a lot are on the lower shelves (about waist level). · Keep a cordless phone and a flashlight with new batteries by your bed. If possible, put a phone in each of the main rooms of your house, or carry a cell phone in case you fall and cannot reach a phone. Or, you can wear a device around your neck or wrist. You push a button that sends a signal for help. · Wear low-heeled shoes that fit well and give your feet good support. Use footwear with nonskid soles. Check the heels and soles of your shoes for wear. Repair or replace worn heels or soles. · Do not wear socks without shoes on wood floors. · Walk on the grass when the sidewalks are slippery. If you live in an area that gets snow and ice in the winter, sprinkle salt on slippery steps and sidewalks. Preventing falls in the bath · Install grab bars and nonskid mats inside and outside your shower or tub and near the toilet and sinks. · Use shower chairs and bath benches. · Use a hand-held shower head that will allow you to sit while showering.  
· Get into a tub or shower by putting the weaker leg in first. Get out of a tub or shower with your strong side first. 
 · Repair loose toilet seats and consider installing a raised toilet seat to make getting on and off the toilet easier. · Keep your bathroom door unlocked while you are in the shower. Where can you learn more? Go to http://agnes-nirmal.info/. Enter [id number] in the search box to learn more about \"Preventing Falls: Care Instructions. \" Current as of: May 12, 2017 Content Version: 11.4 © 9716-6725 [employer]. Care instructions adapted under license by SimpleSite (which disclaims liability or warranty for this information). If you have questions about a medical condition or this instruction, always ask your healthcare professional. [address] any warranty or liability for your use of this information. Preventing Outdoor Falls: Care Instructions Your Care Instructions Worries about falls don't need to keep you indoors. Outdoor activities like walking have big benefits for your health. You will need to watch your step and learn a few safety measures. If you are worried about having a fall outdoors, ask your doctor about exercises, classes, or physical therapy that may help. You can learn ways to gain strength, flexibility, and balance. Ask if it might help to use a cane or walker. You can make your time outdoors safer with a few simple measures. Follow-up care is a key part of your treatment and safety. Be sure to make and go to all appointments, and call your doctor if you are having problems. It's also a good idea to know your test results and keep a list of the medicines you take. How can you prevent falls outdoors? · Wear shoes with firm soles and low heels. If you have to walk on an icy surface, use grippers that can be worn over your shoes in bad weather. · Be extra careful if weather is bad. Walk on the grass when the sidewalks are slick.  If you live in a place that gets snow and ice in the winter, sprinkle salt on slippery stairs and sidewalks. · Be careful getting on or off buses and trains or getting in and out of cars. If handrails are available, use them. · Be careful when you cross the street. Look for crosswalks or places where curb cuts or ramps are present. · Try not to hurry, especially if you are carrying something. · Be cautious in parking lots or garages. There may be curbs or changes in pavement, or the height of the pavement may vary. · Make sure to wear the correct eyeglasses, if you need them. Reading glasses or bifocals can make it harder to see hazards that might be in your way. · If you are walking outdoors for exercise, try to: 
¨ Walk in well-lighted, well-maintained areas. These include high school or college tracks, shopping malls, and public spaces. ¨ Walk with a partner. ¨ Watch out for cracked sidewalks, curbs, changes in the height of the pavement, exposed tree roots, and debris such as fallen leaves or branches. Where can you learn more? Go to http://agnesEscapianirmal.info/. Enter N706 in the search box to learn more about \"Preventing Outdoor Falls: Care Instructions. \" Current as of: May 12, 2017 Content Version: 11.4 © 3125-8482 [employer]. Care instructions adapted under license by [employer] (which disclaims liability or warranty for this information). If you have questions about a medical condition or this instruction, always ask your healthcare professional. [place] [address] any warranty or liability for your use of this information. How to Get Up Safely After a Fall: Care Instructions Your Care Instructions If you have injuries, health problems, or other reasons that may make it easy for you to fall at home, it is a good idea to learn how to get up safely after a fall. Learning how to get up correctly can help you avoid making an injury worse. Also, knowing what to do if you cannot get up can help you stay safe until help arrives. Follow-up care is a key part of your treatment and safety. Be sure to make and go to all appointments, and call your doctor if you are having problems. It's also a good idea to know your test results and keep a list of the medicines you take. How can you care for yourself after a fall? If you think you can get up First lie still for a few minutes and think about how you feel. If your body feels okay and you think you can get up safely, follow the rest of the steps below: 1. Look for a chair or other piece of furniture that is close to you. 2. Roll onto your side and rest. Roll by turning your head in the direction you want to roll, move your shoulder and arm, then hip and leg in the same direction. 3. Lie still for a moment to let your blood pressure adjust. 
4. Slowly push your upper body up, lift your head, and take a moment to rest. 
5. Slowly get up on your hands and knees, and crawl to the chair or other stable piece of furniture. 6. Put your hands on the chair. 7. Move one foot forward, and place it flat on the floor. Your other leg should be bent with the knee on the floor. 8. Rise slowly, turn your body, and sit in the chair. Stay seated for a bit and think about how you feel. Call for help. Even if you feel okay, let someone know what happened to you. You might not know that you have a serious injury. If you cannot get up 1. If you think you are injured after a fall or you cannot get up, try not to panic. 2. Call out for help. 3. If you have a phone within reach or you have an emergency call device, use it to call for help. 4. If you do not have a phone within reach, try to slide yourself toward it. If you cannot get to the phone, try to slide toward a door or window or a place where you think you can be heard. 5. [place] or use an object to make noise so someone might hear you. 6. If you can reach something that you can use for a pillow, place it under your head. Try to stay warm by covering yourself with a blanket or clothing while you wait for help. When should you call for help? Call 911 anytime you think you may need emergency care. For example, call if: 
 · You passed out (lost consciousness). · You cannot get up after a fall. · You have severe pain. Call your doctor now or seek immediate medical care if: 
 · You have new or worse pain. · You are dizzy or lightheaded. · You hit your head. Watch closely for changes in your health, and be sure to contact your doctor if: 
 · You do not get better as expected. Where can you learn more? Go to http://www.gray.com/. Enter S763 in the search box to learn more about \"How to Get Up Safely After a Fall: Care Instructions. \" Current as of: May 12, 2017 Content Version: 11.4 © 0361-2943 [employer]. Care instructions adapted under license by Stretch (which disclaims liability or warranty for this information). If you have questions about a medical condition or this instruction, always ask your healthcare professional. [address] any warranty or liability for your use of this information. Advance Care Planning: Care Instructions Your Care Instructions It can be hard to live with an illness that cannot be cured. But if your health is getting worse, you may want to make decisions about end-of-life care. Planning for the end of your life does not mean that you are giving up. It is a way to make sure that your wishes are met. Clearly stating your wishes can make it easier for your loved ones. Making plans while you are still able may also ease your mind and make your final days less stressful and more meaningful. Follow-up care is a key part of your treatment and safety.  Be sure to make and go to all appointments, and call your doctor if you are having problems. It's also a good idea to know your test results and keep a list of the medicines you take. What can you do to plan for the end of life? · You can bring these issues up with your doctor. You do not need to wait until your doctor starts the conversation. You might start with \"I would not be willing to live with . Melvenia Phyllis Melvenia Phyllis Melvenia Phyllis \" When you complete this sentence it helps your doctor understand your wishes. · Talk openly and honestly with your doctor. This is the best way to understand the decisions you will need to make as your health changes. Know that you can always change your mind. · Ask your doctor about commonly used life-support measures. These include tube feedings, breathing machines, and fluids given through a vein (IV). Understanding these treatments will help you decide whether you want them. · You may choose to have these life-supporting treatments for a limited time. This allows a trial period to see whether they will help you. You may also decide that you want your doctor to take only certain measures to keep you alive. It is important to spell out these conditions so that your doctor and family understand them. · Talk to your doctor about how long you are likely to live. He or she may be able to give you an idea of what usually happens with your specific illness. · Think about preparing papers that state your wishes. This way there will not be any confusion about what you want. You can change your instructions at any time. Which papers should you prepare? Advance directives are legal papers that tell doctors how you want to be cared for at the end of your life. You do not need a  to write these papers. Ask your doctor or your state health department for information on how to write your advance directives. They may have the forms for each of these types of papers.  Make sure your doctor has a copy of these on file, and give a copy to a family member or close friend. · Consider a do-not-resuscitate order (DNR). This order asks that no extra treatments be done if your heart stops or you stop breathing. Extra treatments may include cardiopulmonary resuscitation (CPR), electrical shock to restart your heart, or a machine to breathe for you. If you decide to have a DNR order, ask your doctor to explain and write it. Place the order in your home where everyone can easily see it. · Consider a living will. A living will explains your wishes about life support and other treatments at the end of your life if you become unable to speak for yourself. Living valenzuela tell doctors to use or not use treatments that would keep you alive. You must have one or two witnesses or a notary present when you sign this form. · Consider a durable power of  for health care. This allows you to name a person to make decisions about your care if you are not able to. Most people ask a close friend or family member. Talk to this person about the kinds of treatments you want and those that you do not want. Make sure this person understands your wishes. These legal papers are simple to change. Tell your doctor what you want to change, and ask him or her to make a note in your medical file. Give your family updated copies of the papers. Where can you learn more? Go to http://agnes-nirmal.info/. Enter P184 in the search box to learn more about \"Advance Care Planning: Care Instructions. \" Current as of: September 24, 2016 Content Version: 11.4 © 8008-6173 [employer]. Care instructions adapted under license by [employer]Us (which disclaims liability or warranty for this information). If you have questions about a medical condition or this instruction, always ask your healthcare professional. [address] any warranty or liability for your use of this information. Learning About Durable Power of  for Health Care What is a durable power of  for health care? A durable power of  for health care is one type of the legal forms called advance directives. It lets you decide who you want to make treatment decisions for you if you cannot speak or decide for yourself. The person you choose is called your health care agent. Another type of advance directive is a living will. It lets you write down what kinds of treatment or life support you want or do not want. What should you think about when choosing a health care agent? Choose your health care agent carefully. This person may or may not be a family member. Talk to the person before you make your final decision. Make sure he or she is comfortable with this responsibility. It's a good idea to choose someone who: · Is at least 25years old. · Knows you well and understands what makes life meaningful for you. · Understands your Worship and moral values. · Will do what you want, not what he or she wants. · Will be able to make difficult choices at a stressful time. · Will be able to refuse or stop treatment, if that is what you would want, even if you could die. · Will be firm and confident with health professionals if needed. · Will ask questions to get necessary information. · Lives near you or agrees to travel to you if needed. Your family may help you make medical decisions while you can still be part of that process. But it is important to choose one person to be your health care agent in case you are not able to make decisions for yourself. If you don't fill out the legal form and name a health care agent, the decisions your family can make may be limited. Who will make decisions for you if you do not have a health care agent? If you don't have a health care agent or a living will, your family members may disagree about your medical care.  And then some medical professionals who may not know you as well might have to make decisions for you. In some cases, a  makes the decisions. When you name a health care agent, it is very clear who has the power to make health decisions for you. How do you name a health care agent? You name your health care agent on a legal form. It is usually called a durable power of  for health care. Ask your hospital, state bar association, or office on aging where to find these forms. You must sign the form to make it legal. Some states require you to get the form notarized. This means that a person called a  watches you sign the form and then he or she signs the form. Some states also require that two or more witnesses sign the form. Be sure to tell your family members and doctors who your health care agent is. Keep your forms in a safe place. But make sure that your loved ones know where the forms are. This could be in your desk where you keep other important papers. Make sure your doctor has a copy of your forms. Where can you learn more? Go to http://agnes-nirmal.info/. Enter [id number] in the search box to learn more about \"Learning About Durable Power of  for [facility]. \" Current as of: September 24, 2016 Content Version: 11.4 © 2252-3923 [employer]. Care instructions adapted under license by [employer]G (which disclaims liability or warranty for this information). If you have questions about a medical condition or this instruction, always ask your healthcare professional. [place] [address] any warranty or liability for your use of this information. Deciding About Life-Prolonging Treatment Deciding About Life-Prolonging Treatment What is life-prolonging treatment? There are many kinds of treatment that can help you live longer. These may be needed for only a short time until your illness improves.  Or you may use them over the long term to help keep you alive. Some treatments include the use of: · Medicines to slow the progress of certain diseases, such as heart disease, diabetes, cancer, AIDS, or Alzheimer's disease. · Antibiotics to treat serious infections, such as pneumonia. · Dialysis to clean your blood if your kidneys stop working. · A breathing machine to help you breathe if you can't breathe on your own. This machine pumps air into your lungs through a tube put into your throat. · A feeding tube or an intravenous (IV) line to give you food and fluids if you can't eat or drink. · Cardiopulmonary resuscitation (CPR) to try to restart your heart. The decision to receive treatments that may help you live longer is a personal one. You may want your doctor to do everything possible to keep you alive, even when your chance for recovery is small. Or you may choose to only have care to manage your pain and other symptoms. What are key points about this decision? · If there is a good chance that your illness can be cured or managed, your doctor may advise you to first try available treatments. If these don't work, then you might think about stopping treatment. · If you stop treatment, you will still receive care that focuses on pain relief and comfort. · A decision to stop treatment that keeps you alive does not have to be permanent. You can always change your mind if your health starts to improve. · Even though treatment focuses on helping you live longer, it may cause side effects that can greatly affect your quality of life. And it could affect how you spend time with your family and friends. · If you still have personal goals that you want to pursue, you may want treatment that keeps you alive long enough to reach them. Why might you choose life-prolonging treatment? · There is a good chance that your illness can be cured or managed. · You think you can manage the possible side effects of treatment. · You don't think treatment will get in the way of your quality of life. · You have personal goals that you still want to pursue and achieve. Why might you choose to stop life-prolonging treatment? · Your chance of surviving your illness is very low. · You have tried all possible treatments for your illness, but they have not helped. · You can no longer deal with the side effects of treatment. · You have already met the goals you set out to achieve in your life. Your decision Thinking about the facts and your feelings can help you make a decision that is right for you. Be sure you understand the benefits and risks of your options, and think about what else you need to do before you make the decision. Where can you learn more? Go to http://agnes-nirmal.info/. Enter N437 in the search box to learn more about \"Deciding About Life-Prolonging Treatment. \" Current as of: September 24, 2016 Content Version: 11.4 © 2154-7928 Webdyn. Care instructions adapted under license by Zauber (which disclaims liability or warranty for this information). If you have questions about a medical condition or this instruction, always ask your healthcare professional. [place] [address] any warranty or liability for your use of this information. Jeremiah Noble [address] What is a living will? A living will is a legal form you use to write down the kind of care you want at the end of your life. It is used by the health professionals who will treat you if you aren't able to decide for yourself. If you put your wishes in writing, your loved ones and others will know what kind of care you want. They won't need to guess. This can ease your mind and be helpful to others. A living will is not the same as an estate or property will. An estate will explains what you want to happen with your money and property after you die. Is a living will a legal document? A living will is a legal document. Each state has its own laws about living valenzuela. If you move to another state, make sure that your living will is legal in the state where you now live. Or you might use a universal form that has been approved by many states. This kind of form can sometimes be completed and stored online. Your electronic copy will then be available wherever you have a connection to the Internet. In most cases, doctors will respect your wishes even if you have a form from a different state. · You don't need an  to complete a living will. But legal advice can be helpful if your state's laws are unclear, your health history is complicated, or your family can't agree on what should be in your living will. · You can change your living will at any time. Some people find that their wishes about end-of-life care change as their health changes. · In addition to making a living will, think about completing a medical power of  form. This form lets you name the person you want to make end-of-life treatment decisions for you (your \"health care agent\") if you're not able to. Many hospitals and nursing homes will give you the forms you need to complete a living will and a medical power of . · Your living will is used only if you can't make or communicate decisions for yourself anymore. If you become able to make decisions again, you can accept or refuse any treatment, no matter what you wrote in your living will. · Your state may offer an online registry. This is a place where you can store your living will online so the doctors and nurses who need to treat you can find it right away. What should you think about when creating a living will? Talk about your end-of-life wishes with your family members and your doctor. Let them know what you want. That way the people making decisions for you won't be surprised by your choices. Think about these questions as you make your living will: · Do you know enough about life support methods that might be used? If not, talk to your doctor so you know what might be done if you can't breathe on your own, your heart stops, or you're unable to swallow. · What things would you still want to be able to do after you receive life-support methods? Would you want to be able to walk? To speak? To eat on your own? To live without the help of machines? · If you have a choice, where do you want to be cared for? In your home? At a hospital or nursing home? · Do you want certain Zoroastrian practices performed if you become very ill? · If you have a choice at the end of your life, where would you prefer to die? At home? In a hospital or nursing home? Somewhere else? · Would you prefer to be buried or cremated? · Do you want your organs to be donated after you die? What should you do with your living will? · Make sure that your family members and your health care agent have copies of your living will. · Give your doctor a copy of your living will to keep in your medical record. If you have more than one doctor, make sure that each one has a copy. · You may want to put a copy of your living will where it can be easily found. Where can you learn more? Go to http://agnes-nirmal.info/. Enter J599 in the search box to learn more about \"Learning About Living Angeli. \" Current as of: September 24, 2016 Content Version: 11.4 © 6235-7412 RoundPegg. Care instructions adapted under license by Fliplife (which disclaims liability or warranty for this information). If you have questions about a medical condition or this instruction, always ask your healthcare professional. [address] any warranty or liability for your use of this information. Advance Directives: Care Instructions Your Care Instructions An advance directive is a legal way to state your wishes at the end of your life. It tells your family and your doctor what to do if you can no longer say what you want. There are two main types of advance directives. You can change them any time that your wishes change. · A living will tells your family and your doctor your wishes about life support and other treatment. · A durable power of  for health care lets you name a person to make treatment decisions for you when you can't speak for yourself. This person is called a health care agent. If you do not have an advance directive, decisions about your medical care may be made by a doctor or a  who doesn't know you. It may help to think of an advance directive as a gift to the people who care for you. If you have one, they won't have to make tough decisions by themselves. Follow-up care is a key part of your treatment and safety. Be sure to make and go to all appointments, and call your doctor if you are having problems. It's also a good idea to know your test results and keep a list of the medicines you take. How can you care for yourself at home? · Discuss your wishes with your loved ones and your doctor. This way, there are no surprises. · Many states have a unique form. Or you might use a universal form that has been approved by many states. This kind of form can sometimes be completed and stored online. Your electronic copy will then be available wherever you have a connection to the Internet. In most cases, doctors will respect your wishes even if you have a form from a different state. · You don't need a  to do an advance directive. But you may want to get legal advice. · Think about these questions when you prepare an advance directive: ¨ Who do you want to make decisions about your medical care if you are not able to? Many people choose a family member or close friend. ¨ Do you know enough about life support methods that might be used? If not, talk to your doctor so you understand. ¨ What are you most afraid of that might happen? You might be afraid of having pain, losing your independence, or being kept alive by machines. ¨ Where would you prefer to die? Choices include your home, a hospital, or a nursing home. ¨ Would you like to have information about hospice care to support you and your family? ¨ Do you want to donate organs when you die? ¨ Do you want certain Baptism practices performed before you die? If so, put your wishes in the advance directive. · Read your advance directive every year, and make changes as needed. When should you call for help? Be sure to contact your doctor if you have any questions. Where can you learn more? Go to http://www.gray.com/. Enter R264 in the search box to learn more about \"Advance Directives: Care Instructions. \" Current as of: September 24, 2016 Content Version: 11.4 © 1472-1082 NextDocs. Care instructions adapted under license by MacuCLEAR (which disclaims liability or warranty for this information). If you have questions about a medical condition or this instruction, always ask your healthcare professional. [place] [address] any warranty or liability for your use of this information. [facility] Advance Care Planning: EAWWB [id number] The way we are cared for matters. Advance care planning means making The way we are cared for matters. Advance care planning means making decisions 
about health care you want to receive if you face a medical crisis, and it is a vital part of 
your care. Your personal values, preferences and discussions with loved ones all factor 
in to these choices. We at [facility] can help you get started. Think about what matters to you  Who do I trust and would be available quickly to make medical decisions for me if I cant 
make them myself?  What are the most important things my loved ones and doctors should know about my 
values, beliefs, and what gives me strength in difficult times?  If I had a sudden illness or accident and was not expected to recover, have I told 
anyone my wishes for care? Now is the time to have this discussion. Learn about treatments options & COVID-19 Advance care planning includes learning about the types of life-sustaining treatments 
that are available, and then deciding what types of treatments you would or would not 
want should you be diagnosed with a life-limiting illness. Advance care planning forms 
give you a choice to use or not use ventilators (breathing machines), tube feedings, and 
attempts at CPR (cardiopulmonary resuscitation). By considering when and if you would 
want these treatments, it may relieve some pressure on you and your loved ones if the 
decision presents itself. It is important to know:  Some people who get COVID-19, especially those who are young and healthy, will get better with 
routine care. People who get a severe case are mostly those who are older or have other medical 
problems. Over half of people who are hospitalized and placed on a ventilator (breathing machine) do 
not survive.  If someone develops symptoms that are severe enough to seek treatment in the hospital, and their care 
preferences are not known, they may receive treatment that they did not want or that wont reverse 
their condition.  If you receive health care during the COVID-19 pandemic, your care team may talk with you about 
CPR. Be aware that availability of CPR may change depending on resources and if the performance 
of CPR would put staff and others at risk of exposure. Talk with your doctor about any questions you 
have. Ask Yourself: What matters most to me? If I became very sick with COVID-19 would I prefer to 
stay where I live or go to the hospital? Next step: Talk with your loved ones about your wishes 
and what matters to you. Finally, tell your healthcare provider and write down your wishes in a 
document called an advance directive. When your healthcare team knows what matters to you, 
you will receive care that is better for you.

## 2020-09-09 NOTE — PROGRESS NOTES
Leif Carlin is a 80 y.o. female evaluated via telephone on 9/9/2020. Consent: 
She and/or health care decision maker is aware that that she may receive a bill for this telephone service, depending on her insurance coverage, and has provided verbal consent to proceed: Yes SUBJECTIVE: 
Leif Carlin is a 80y.o. year old female Chief Complaint Patient presents with  Dementia  Hypertension  Diabetes  Cholesterol Problem History of Present Illness: In march 2020, she started having problems finding things, forgetting to take medication, having problem remembering and also having some cognition problems at times. She has history of old stroke with problem ambulating. There is no change in her gait or speech. She has not developed any focal weakness. The work-up showed MRI with subacute frontal lobe stroke and many chronic infarcts. In agreement with her daughter, the patient was started on Aricept. She is tolerating well and there has been improvement in that mental status. However, a month ago in @ 6 AM, she wandered to her neighbor's house still in her pajamas. It has not happened since. She is otherwise her usual self. Her blood pressure has been running good at home on the current regimen. To help with stroke prevention her simvastatin was increased and she has been tolerating it without any problem. Her ASHD is controlled. No angina. She is also seeing cardiologist. 
 
She is following ADA diet for DM II. Not checking her glucose regularly; but her hemoglobin A1c has been in diabetes control range. No Systemic, Cardiovascular or Respiratory symptoms. Past Medical History:  
Diagnosis Date  Arteriosclerotic heart disease (ASHD)  CKD (chronic kidney disease), stage III ([address] 6/23/2015  CVA (cerebral infarction)  Diabetes ([address]  Gout 9/23/13 Left Foot  Hypertension  Leg weakness, bilateral 3/18/13 No past surgical history on file. Current Outpatient Medications Medication Sig  
 amLODIPine (NORVASC) 5 mg tablet take 2 tablets by mouth once daily  donepeziL (ARICEPT) 10 mg tablet take 1 tablet by mouth nightly  simvastatin (ZOCOR) 10 mg tablet Take 1 Tab by mouth nightly.  clopidogreL (PLAVIX) 75 mg tab take 1 tablet by mouth once daily  hydroCHLOROthiazide (HYDRODIURIL) 12.5 mg tablet take 1/2 tablet by mouth daily  metoprolol tartrate (LOPRESSOR) 50 mg tablet take 1/2 tablet by mouth twice a day  Walker mis Walker for aid in ambulation and preventing falls.  glucose blood VI test strips (BLOOD GLUCOSE TEST) strip Check glucose once daily. Re: DM .00  Blood-Glucose Meter monitoring kit Check glucose once daily. Re: DM .00  
 meclizine (ANTIVERT) 25 mg tablet Take 25 mg by mouth three (3) times daily.  Lancets misc Check glucose once daily. Re: DM .00  aspirin delayed-release 81 mg tablet Take 81 mg by mouth daily. No current facility-administered medications for this visit. Allergies Allergen Reactions  Valsartan Anaphylaxis Review of Systems:  
Constitutional: No fever, chills, night sweats, malaise. Cardiovascular: No angina, palpitations, PND, orthopnea, lightheadedness, edema, claudication. Nephrology: Stable CKD 3. Respiratory: No dyspnea, wheeze, pleurisy, hemoptysis, unusual cough or sputum. Gastrointestinal: no nausea/ vomiting, bowel habit change, pain, LI symptoms, melena, hematochezia, anorexia. Psychiatric: Memory and cognition problem as above. No agitation, suicidal or homicidal ideation. Neurological: History of multiple strokes. No seizures, numbness, acute speech abnormality, incontinence. Endocrine: Diabetes as above. OBJECTIVE: 
Physical Exam:  
 
Constitutional: in no acute distress. Pulmonary: no dyspnea. Psychiatric[de-identified] Pleasant and cooperative. Oriented to time, place and person. Neurological[de-identified] Speech stable Lab Results Component Value Date/Time WBC 8.3 03/10/2020 02:26 PM  
 HGB 13.5 03/10/2020 02:26 PM  
 HCT 41.5 03/10/2020 02:26 PM  
 PLATELET 595 94/94/0531 02:26 PM  
 MCV 77.4 03/10/2020 02:26 PM  
 
 
Lab Results Component Value Date/Time Sodium 140 03/10/2020 02:26 PM  
 Potassium 4.6 03/10/2020 02:26 PM  
 Chloride 105 03/10/2020 02:26 PM  
 CO2 26 03/10/2020 02:26 PM  
 Anion gap 9 03/10/2020 02:26 PM  
 Glucose 233 (H) 03/10/2020 02:26 PM  
 BUN 34 (H) 03/10/2020 02:26 PM  
 Creatinine 1.42 (H) 03/10/2020 02:26 PM  
 BUN/Creatinine ratio 24 (H) 03/10/2020 02:26 PM  
 GFR est AA 42 (L) 03/10/2020 02:26 PM  
 GFR est non-AA 35 (L) 03/10/2020 02:26 PM  
 Calcium 9.3 03/10/2020 02:26 PM  
 Bilirubin, total 0.4 02/28/2019 12:46 PM  
 Alk. phosphatase 72 02/28/2019 12:46 PM  
 Protein, total 7.3 02/28/2019 12:46 PM  
 Albumin 4.0 02/28/2019 12:46 PM  
 Globulin 3.3 02/28/2019 12:46 PM  
 A-G Ratio 1.2 02/28/2019 12:46 PM  
 ALT (SGPT) 15 10/17/2019 09:45 AM  
 
 
Lab Results Component Value Date/Time TSH 2.46 03/10/2020 02:26 PM  
 
Lab Results Component Value Date/Time Cholesterol, total 127 10/17/2019 09:45 AM  
 HDL Cholesterol 59 10/17/2019 09:45 AM  
 LDL, calculated 47.6 10/17/2019 09:45 AM  
 VLDL, calculated 20.4 10/17/2019 09:45 AM  
 Triglyceride 102 10/17/2019 09:45 AM  
 CHOL/HDL Ratio 2.2 10/17/2019 09:45 AM  
 
Lab Results Component Value Date/Time Hemoglobin A1c 7.0 (H) 10/17/2019 09:45 AM  
 Hemoglobin A1c (POC) 6.4 01/16/2020 10:57 AM  
 
 
Lab Results Component Value Date/Time TSH 2.46 03/10/2020 02:26 PM  
 
Lab Results Component Value Date/Time Vitamin B12 450 03/10/2020 02:26 PM  
 
MRI of head without contrast 3/20/2020: 
Small subacute stroke in the right lateral frontal lobe with petechial hemorrhagic conversion. Multifocal chronic infarcts in the right frontal lobe and bilateral cerebellum. Additional chronic deep cerebral lacunar infarcts on the left. Moderately extensive amount of chronic microvascular ischemic disease. 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 ASSESSMENT:  
 
1. Medicare annual wellness visit, subsequent 2. Multi-infarct dementia with behavior disturbance [address] [address] [facility] 3. Essential hypertension 4. Type 2 diabetes with nephropathy (HCC) 5. Hyperlipidemia, unspecified hyperlipidemia type 6. Senile debility 7. ACP (advance care planning) PLAN:  
 
Pharmacologic Management: Medications reviewed with the patient. Continue with current treatment. Orders Placed This Encounter  HEMOGLOBIN A1C WITH EAG  
 LIPID PANEL  
 METABOLIC PANEL, COMPREHENSIVE  
 [address] [facility],[address] Daughter is caution about harm from wandering Will refer to home health. The patient is dependent on family most ADL and all travel. Discussed DDx, follow-up & work-up. Discussed risk/benefit & side effect of treatment. Low salt ADA diet & graduated excecise. Recommend continue with home BP and glucose monitoring. Health risk from non adherence discussed. Patient/ daughter voiced understanding. Gelacio Fischer is a 80 y.o. female evaluated via telephone on 9/9/2020. I affirm that this is a Patient Initiated Episode with an Established Patient who has not had a related appointment within my department in the past 7 days or scheduled within the next 24 hours. Total Time Spent: 22 minutes Rafia Durán MD

## 2020-09-09 NOTE — ACP (ADVANCE CARE PLANNING)
Advance Care Planning (ACP) Provider Note - Comprehensive Date of ACP Conversation: 09/09/20 Persons included in Conversation:  patient and family/daughter Length of ACP Conversation in minutes:  18 minutes Authorized Decision Maker: Daughter/ patient The following General ACP was discussed with:patient and family/ daughter - Importance of advance care planning, including choosing a healthcare agent to  communicate patient's healthcare decisions if patient lost the ability to make decisions, such as after a sudden illness or accident. - Understanding of the healthcare agent role was assessed and information provided. - Exploration of values, goals, and preferences if recovery is not expected, even with continued medical treatment in the event of: Imminent death and/or Severe permanent brain injury. \"In these circumstances, what matters most to you? \":  The patient/family has not decided between quality of life versus life prolonging measures. She will decide. \"What, if any, treatments would you want to avoid? \":  The patient/family has not decided. Discussed with the patient. - Opportunity offered to explore how cultural, Sikh, spiritual, or personal beliefs would affect decisions for future care. Review of Existing Advance Directive:not applicable Form has been provided. Family may  new form from clinic if needed. For Serious or Chronic Illness: The following items were discussed with the patient who verbalized understanding: - Understanding of medical condition.   
 
- Understanding of CPR, goals and expected outcomes, benefits and burdens discussed. - Information on CPR success rates provided (e.g. for CPR in hospital, survival to d/c at two weeks is 22%, for chronically ill or elderly/frail survival is less than 3%); the patient was asked to communicate understanding of information in his/her own words. - Explored fears and concerns regarding CPR or possible outcomes Interventions Provided: 
Recommended completion of Advance Directive form after review of ACP materials and conversation with prospective healthcare agent. Recommended communicating the plan and making copies for the healthcare agent, personal physician, and others as appropriate. Recommended review of completed ACP document annually or upon change in health status. Recommended that if she needs any assistance we are available. Summary: 
The patient was advised that: She may like to appoint a person as her medical decision maker in the event that she can no longer do so. She may appoint a secondary person also. She is encouraged to make decision at this time re: if she wants life prolonging measures in the event- 
   A) her death is imminent and medical treatment will not help her recover. B) her condition makes her unaware of herself or her surroundings and she cannot interact with others. Patient likely will have her daughters as her agents, if she cannot speak for herself. Primary and secondary to be decided. If her death is imminent and medical treatment will not help her recover, the patient/family has not decided about life prolonging measure in the situation. If her condition makes her unaware of self or surroundings and she cannot interact with others, the patient/family has not decided about life prolonging measure in the situation. Patient/ family is encouraged bring her advance directive to the next visit. Family is instruced get today's AVS/ forms from [facility] or from the clinic.

## 2020-09-09 NOTE — PROGRESS NOTES
This is the Subsequent Medicare Annual Wellness Exam, performed 12 months or more after the Initial AWV or the last Subsequent AWV I have reviewed the patient's medical history in detail and updated the computerized patient record. History Past Medical History:  
Diagnosis Date  Arteriosclerotic heart disease (ASHD)  CKD (chronic kidney disease), stage III ([facility] [address] 6/23/2015  CVA (cerebral infarction)  Diabetes ([facility] [address]  Gout 9/23/13 Left Foot  Hypertension  Leg weakness, bilateral 3/18/13 No past surgical history on file. Current Outpatient Medications Medication Sig Dispense Refill  amLODIPine (NORVASC) 5 mg tablet take 2 tablets by mouth once daily 180 Tab 1  
 donepeziL (ARICEPT) 10 mg tablet take 1 tablet by mouth nightly 90 Tab 3  
 simvastatin (ZOCOR) 10 mg tablet Take 1 Tab by mouth nightly. 90 Tab 1  clopidogreL (PLAVIX) 75 mg tab take 1 tablet by mouth once daily 90 Tab 1  
 hydroCHLOROthiazide (HYDRODIURIL) 12.5 mg tablet take 1/2 tablet by mouth daily 45 Tab 3  
 metoprolol tartrate (LOPRESSOR) 50 mg tablet take 1/2 tablet by mouth twice a day 90 Tab 3  
 Walker misc Walker for aid in ambulation and preventing falls. 1 Each 0  
 glucose blood VI test strips (BLOOD GLUCOSE TEST) strip Check glucose once daily. Re: DM .00 100 Strip 1  Blood-Glucose Meter monitoring kit Check glucose once daily. Re: DM .00 1 Kit 0  
 meclizine (ANTIVERT) 25 mg tablet Take 25 mg by mouth three (3) times daily.  Lancets misc Check glucose once daily. Re: DM .00 1 Each 11  
 aspirin delayed-release 81 mg tablet Take 81 mg by mouth daily. Allergies Allergen Reactions  Valsartan Anaphylaxis No family history on file. Social History Tobacco Use  Smoking status: Former Smoker Last attempt to quit: 1/1/2005 Years since quitting: 15.6  Smokeless tobacco: Never Used  Tobacco comment: does not remember how many years for smoking/packs Substance Use Topics  Alcohol use: No  
  Alcohol/week: 0.0 standard drinks Patient Active Problem List  
Diagnosis Code  Gout M10.9  Leg weakness, bilateral R29.898  Hypertension I10  
 CKD (chronic kidney disease), stage III (HCC) N18.3  ASHD (arteriosclerotic heart disease) I25.10  Heart murmur R01.1  Eczema L30.9  BPV (benign positional vertigo) H81.10  Type 2 diabetes with nephropathy (HCC) E11.21  
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  
 
3 most recent PHQ Screens 1/16/2020 Little interest or pleasure in doing things Not at all Feeling down, depressed, irritable, or hopeless Not at all Total Score PHQ 2 0 Reviewed. No change since 1/16/2020. Alcohol Risk Factor Screening: You do not drink alcohol or very rarely. Functional Ability and Level of Safety:  
Hearing Loss Hearing is good. Activities of Daily Living The home contains: handrails and grab bars Patient needs help with:  transportation, shopping, preparing meals, laundry, housework, managing medications, managing money and walking Fall Risk Fall Risk Assessment, last 12 mths 1/16/2020 Able to walk? Yes Fall in past 12 months? No  
Number of falls in past 12 months - Reviewed. No fall since 1/16/2020. Abuse Screen Patient is not abused Cognitive Screening Evaluation of Cognitive Function: 
Has your family/caregiver stated any concerns about your memory: yes - dementia Cognition: Abnormal 
 
Patient Care Team  
Patient Care Team: 
Aguila Persaud MD as PCP - General (Family Medicine) Aguila Persaud MD as PCP - [facility] Empaneled Provider Amina Zuniga MD as Physician (Oncology) Christo Suazo MD as Physician (Cardiology) Assessment/Plan Education and counseling provided: 
Are appropriate based on today's review and evaluation Diagnoses and all orders for this visit: 
 
1. Medicare annual wellness visit, subsequent 2. Multi-infarct dementia with behavior disturbance ([facility] [address] 
-     [facility] 3. Essential hypertension -     LIPID PANEL; Future -     METABOLIC PANEL, COMPREHENSIVE; Future 4. Type 2 diabetes with nephropathy (HCC) 
-     HEMOGLOBIN A1C WITH EAG; Future -     LIPID PANEL; Future -     METABOLIC PANEL, COMPREHENSIVE; Future 5. Hyperlipidemia, unspecified hyperlipidemia type -     LIPID PANEL; Future 6. Senile debility 
-     [facility] 7. ACP (advance care planning) Health Maintenance Due Topic Date Due  Shingrix Vaccine Age 50> (1 of 2) [birth year]/1980  Medicare Yearly Exam  02/29/2020  Flu Vaccine (1) 09/01/2020  Foot Exam Q1  09/17/2020 To get flu vaccine and Shingrix in the clinic and/or in her pharmacy. Foot Exam deferred till in-person visit. Family is instruced get today's AVS/ forms from [employer] or from the clinic.

## 2020-09-11 ENCOUNTER — HOME CARE VISIT (OUTPATIENT)
Dept: HOME HEALTH SERVICES | Facility: HOME HEALTH | Age: 85
End: 2020-09-11

## 2020-09-15 ENCOUNTER — HOME CARE VISIT (OUTPATIENT)
Dept: HOME HEALTH SERVICES | Facility: HOME HEALTH | Age: 85
End: 2020-09-15

## 2020-09-15 ENCOUNTER — HOSPITAL ENCOUNTER (OUTPATIENT)
Dept: LAB | Age: 85
Discharge: HOME | End: 2020-09-15
Payer: MEDICARE

## 2020-09-15 DIAGNOSIS — E11.21 TYPE 2 DIABETES WITH NEPHROPATHY (HCC): ICD-10-CM

## 2020-09-15 DIAGNOSIS — I10 ESSENTIAL HYPERTENSION: ICD-10-CM

## 2020-09-15 DIAGNOSIS — E78.5 HYPERLIPIDEMIA, UNSPECIFIED HYPERLIPIDEMIA TYPE: ICD-10-CM

## 2020-09-15 LAB
ALBUMIN SERPL-MCNC: 3.3 G/DL (ref 3.4–5)
ALBUMIN/GLOB SERPL: 0.8 {RATIO} (ref 0.8–1.7)
ALP SERPL-CCNC: 68 U/L (ref 45–117)
ALT SERPL-CCNC: 20 U/L (ref 13–56)
ANION GAP SERPL CALC-SCNC: 4 MMOL/L (ref 3–18)
AST SERPL-CCNC: 17 U/L (ref 10–38)
BILIRUB SERPL-MCNC: 1.3 MG/DL (ref 0.2–1)
BUN SERPL-MCNC: 27 MG/DL (ref 7–18)
BUN/CREAT SERPL: 21 (ref 12–20)
CALCIUM SERPL-MCNC: 9 MG/DL (ref 8.5–10.1)
CHLORIDE SERPL-SCNC: 110 MMOL/L (ref 100–111)
CHOLEST SERPL-MCNC: 115 MG/DL
CO2 SERPL-SCNC: 27 MMOL/L (ref 21–32)
CREAT SERPL-MCNC: 1.27 MG/DL (ref 0.6–1.3)
EST. AVERAGE GLUCOSE BLD GHB EST-MCNC: 157 MG/DL
GLOBULIN SER CALC-MCNC: 3.9 G/DL (ref 2–4)
GLUCOSE SERPL-MCNC: 122 MG/DL (ref 74–99)
HBA1C MFR BLD: 7.1 % (ref 4.2–5.6)
HDLC SERPL-MCNC: 62 MG/DL (ref 40–60)
HDLC SERPL: 1.9 {RATIO} (ref 0–5)
LDLC SERPL CALC-MCNC: 26.4 MG/DL (ref 0–100)
LIPID PROFILE,FLP: ABNORMAL
POTASSIUM SERPL-SCNC: 4.6 MMOL/L (ref 3.5–5.5)
PROT SERPL-MCNC: 7.2 G/DL (ref 6.4–8.2)
SODIUM SERPL-SCNC: 141 MMOL/L (ref 136–145)
TRIGL SERPL-MCNC: 133 MG/DL (ref ?–150)
VLDLC SERPL CALC-MCNC: 26.6 MG/DL

## 2020-09-15 PROCEDURE — 80061 LIPID PANEL: CPT

## 2020-09-15 PROCEDURE — 36415 COLL VENOUS BLD VENIPUNCTURE: CPT

## 2020-09-15 PROCEDURE — 80053 COMPREHEN METABOLIC PANEL: CPT

## 2020-09-15 PROCEDURE — 83036 HEMOGLOBIN GLYCOSYLATED A1C: CPT

## 2020-09-16 ENCOUNTER — HOME CARE VISIT (OUTPATIENT)
Dept: HOME HEALTH SERVICES | Facility: HOME HEALTH | Age: 85
End: 2020-09-16

## 2020-09-16 ENCOUNTER — TELEPHONE (OUTPATIENT)
Dept: FAMILY MEDICINE CLINIC | Facility: CLINIC | Age: 85
End: 2020-09-16

## 2020-09-21 ENCOUNTER — TELEPHONE (OUTPATIENT)
Dept: FAMILY MEDICINE CLINIC | Facility: CLINIC | Age: 85
End: 2020-09-21

## 2020-09-21 DIAGNOSIS — R54 SENILE DEBILITY: ICD-10-CM

## 2020-09-21 DIAGNOSIS — F01.518 MULTI-INFARCT DEMENTIA WITH BEHAVIOR DISTURBANCE: Primary | ICD-10-CM

## 2020-09-21 DIAGNOSIS — Z86.73 HISTORY OF MULTIPLE STROKES: ICD-10-CM

## 2020-09-21 NOTE — TELEPHONE ENCOUNTER
Patient's daughter is calling stating that patient needs an updated referral for Home care, Physical Therapy.

## 2020-09-24 ENCOUNTER — CLINICAL SUPPORT (OUTPATIENT)
Dept: FAMILY MEDICINE CLINIC | Age: 85
End: 2020-09-24
Payer: MEDICARE

## 2020-09-24 VITALS — TEMPERATURE: 97.2 F

## 2020-09-24 DIAGNOSIS — Z23 NEEDS FLU SHOT: Primary | ICD-10-CM

## 2020-09-24 PROCEDURE — 90653 IIV ADJUVANT VACCINE IM: CPT | Performed by: FAMILY MEDICINE

## 2020-09-24 PROCEDURE — G0008 ADMIN INFLUENZA VIRUS VAC: HCPCS | Performed by: FAMILY MEDICINE

## 2020-09-24 NOTE — PATIENT INSTRUCTIONS
Vaccine Information Statement Influenza (Flu) Vaccine (Inactivated or Recombinant): What You Need to Know Many Vaccine Information Statements are available in [place] sor[place] and other languages. See www.immunize.org/vis Hojas de información sobre vacunas están disponibles en español y en muchos otros idiomas. Visite www.immunize.org/vis 1. Why get vaccinated? Influenza vaccine can prevent influenza (flu). Flu is a contagious disease that spreads around the [facility] every year, usually between October and May. Anyone can get the flu, but it is more dangerous for some people. Infants and young children, people 72years of age and older, pregnant women, and people with certain health conditions or a weakened immune system are at greatest risk of flu complications. Pneumonia, bronchitis, sinus infections and ear infections are examples of flu-related complications. If you have a medical condition, such as heart disease, cancer or diabetes, flu can make it worse. Flu can cause fever and chills, sore throat, muscle aches, fatigue, cough, headache, and runny or stuffy nose. Some people may have vomiting and diarrhea, though this is more common in children than adults. Each year thousands of people in the [facility] die from flu, and many more are hospitalized. Flu vaccine prevents millions of illnesses and flu-related visits to the doctor each year. 2. Influenza vaccines CDC recommends everyone 10months of age and older get vaccinated every flu season. Children 6 months through 6years of age may need 2 doses during a single flu season. Everyone else needs only 1 dose each flu season. It takes about 2 weeks for protection to develop after vaccination. There are many flu viruses, and they are always changing. Each year a new flu vaccine is made to protect against three or four viruses that are likely to cause disease in the upcoming flu season.  Even when the vaccine doesnt exactly match these viruses, it may still provide some protection. Influenza vaccine does not cause flu. Influenza vaccine may be given at the same time as other vaccines. 3. Talk with your health care provider Tell your vaccine provider if the person getting the vaccine: 
 Has had an allergic reaction after a previous dose of influenza vaccine, or has any severe, life-threatening allergies.  Has ever had Guillain-Barré Syndrome (also called GBS). In some cases, your health care provider may decide to postpone influenza vaccination to a future visit. People with minor illnesses, such as a cold, may be vaccinated. People who are moderately or severely ill should usually wait until they recover before getting influenza vaccine. Your health care provider can give you more information. 4. Risks of a reaction  Soreness, redness, and swelling where shot is given, fever, muscle aches, and headache can happen after influenza vaccine.  There may be a very small increased risk of Guillain-Barré Syndrome (GBS) after inactivated influenza vaccine (the flu shot). Mancel Palin children who get the flu shot along with pneumococcal vaccine (PCV13), and/or DTaP vaccine at the same time might be slightly more likely to have a seizure caused by fever. Tell your health care provider if a child who is getting flu vaccine has ever had a seizure. People sometimes faint after medical procedures, including vaccination. Tell your provider if you feel dizzy or have vision changes or ringing in the ears. As with any medicine, there is a very remote chance of a vaccine causing a severe allergic reaction, other serious injury, or death. 5. What if there is a serious problem? An allergic reaction could occur after the vaccinated person leaves the clinic.  If you see signs of a severe allergic reaction (hives, swelling of the face and throat, difficulty breathing, a fast heartbeat, dizziness, or weakness), call 9-1-1 and get the person to the nearest hospital. 
 
For other signs that concern you, call your health care provider. Adverse reactions should be reported to the Vaccine Adverse Event Reporting System (VAERS). Your health care provider will usually file this report, or you can do it yourself. Visit the VAERS website at www.vaers. hhs.gov or call 6-906.350.4305. VAERS is only for reporting reactions, and VAERS staff do not give medical advice. 6. The National Vaccine Injury Compensation Program 
 
The [facility] Vaccine Injury Compensation Program (VICP) is a federal program that was created to compensate people who may have been injured by certain vaccines. Visit the VICP website at www.hrsa.gov/vaccinecompensation or call 8-936.110.9264 to learn about the program and about filing a claim. There is a time limit to file a claim for compensation. 7. How can I learn more?  Ask your health care provider.  Call your local or state health department.  Contact the Centers for Disease Control and Prevention (CDC): 
- Call 4-600.914.3824 (6-608-IAM-INFO) or 
- Visit CDCs influenza website at www.cdc.gov/flu Vaccine Information Statement (Interim) Inactivated Influenza Vaccine 8/15/2019 
[address]hoff [id number]-[id number] Department of Health and [employer] Centers for Disease Control and Prevention Office Use Only

## 2020-09-25 ENCOUNTER — HOME HEALTH ADMISSION (OUTPATIENT)
Dept: HOME HEALTH SERVICES | Facility: HOME HEALTH | Age: 85
End: 2020-09-25
Payer: MEDICARE

## 2020-09-28 ENCOUNTER — HOME CARE VISIT (OUTPATIENT)
Dept: HOME HEALTH SERVICES | Facility: HOME HEALTH | Age: 85
End: 2020-09-28

## 2020-09-29 ENCOUNTER — HOME CARE VISIT (OUTPATIENT)
Dept: HOME HEALTH SERVICES | Facility: HOME HEALTH | Age: 85
End: 2020-09-29

## 2020-10-01 ENCOUNTER — HOME CARE VISIT (OUTPATIENT)
Dept: SCHEDULING | Facility: HOME HEALTH | Age: 85
End: 2020-10-01
Payer: MEDICARE

## 2020-10-01 VITALS
SYSTOLIC BLOOD PRESSURE: 150 MMHG | TEMPERATURE: 97.2 F | RESPIRATION RATE: 16 BRPM | DIASTOLIC BLOOD PRESSURE: 72 MMHG | HEART RATE: 62 BPM | OXYGEN SATURATION: 97 %

## 2020-10-01 PROCEDURE — 3331090002 HH PPS REVENUE DEBIT

## 2020-10-01 PROCEDURE — 400013 HH SOC

## 2020-10-01 PROCEDURE — G0151 HHCP-SERV OF PT,EA 15 MIN: HCPCS

## 2020-10-01 PROCEDURE — 3331090001 HH PPS REVENUE CREDIT

## 2020-10-02 ENCOUNTER — HOME CARE VISIT (OUTPATIENT)
Dept: HOME HEALTH SERVICES | Facility: HOME HEALTH | Age: 85
End: 2020-10-02
Payer: MEDICARE

## 2020-10-02 PROCEDURE — 3331090001 HH PPS REVENUE CREDIT

## 2020-10-02 PROCEDURE — 3331090002 HH PPS REVENUE DEBIT

## 2020-10-03 PROCEDURE — 3331090001 HH PPS REVENUE CREDIT

## 2020-10-03 PROCEDURE — 3331090002 HH PPS REVENUE DEBIT

## 2020-10-04 PROCEDURE — 3331090002 HH PPS REVENUE DEBIT

## 2020-10-04 PROCEDURE — 3331090001 HH PPS REVENUE CREDIT

## 2020-10-05 PROCEDURE — 3331090001 HH PPS REVENUE CREDIT

## 2020-10-05 PROCEDURE — 3331090002 HH PPS REVENUE DEBIT

## 2020-10-06 ENCOUNTER — HOME CARE VISIT (OUTPATIENT)
Dept: SCHEDULING | Facility: HOME HEALTH | Age: 85
End: 2020-10-06
Payer: MEDICARE

## 2020-10-06 PROCEDURE — 3331090001 HH PPS REVENUE CREDIT

## 2020-10-06 PROCEDURE — 3331090002 HH PPS REVENUE DEBIT

## 2020-10-06 PROCEDURE — G0157 HHC PT ASSISTANT EA 15: HCPCS

## 2020-10-07 ENCOUNTER — HOME CARE VISIT (OUTPATIENT)
Dept: SCHEDULING | Facility: HOME HEALTH | Age: 85
End: 2020-10-07
Payer: MEDICARE

## 2020-10-07 VITALS
SYSTOLIC BLOOD PRESSURE: 125 MMHG | DIASTOLIC BLOOD PRESSURE: 70 MMHG | HEART RATE: 62 BPM | OXYGEN SATURATION: 98 % | RESPIRATION RATE: 16 BRPM | TEMPERATURE: 97.8 F

## 2020-10-07 VITALS
OXYGEN SATURATION: 98 % | DIASTOLIC BLOOD PRESSURE: 73 MMHG | TEMPERATURE: 99.3 F | HEART RATE: 57 BPM | SYSTOLIC BLOOD PRESSURE: 167 MMHG | RESPIRATION RATE: 20 BRPM

## 2020-10-07 PROCEDURE — G0153 HHCP-SVS OF S/L PATH,EA 15MN: HCPCS

## 2020-10-07 PROCEDURE — 3331090001 HH PPS REVENUE CREDIT

## 2020-10-07 PROCEDURE — 3331090002 HH PPS REVENUE DEBIT

## 2020-10-08 ENCOUNTER — HOME CARE VISIT (OUTPATIENT)
Dept: SCHEDULING | Facility: HOME HEALTH | Age: 85
End: 2020-10-08
Payer: MEDICARE

## 2020-10-08 VITALS
SYSTOLIC BLOOD PRESSURE: 146 MMHG | TEMPERATURE: 97.5 F | DIASTOLIC BLOOD PRESSURE: 85 MMHG | HEART RATE: 62 BPM | OXYGEN SATURATION: 99 % | RESPIRATION RATE: 16 BRPM

## 2020-10-08 PROCEDURE — 3331090002 HH PPS REVENUE DEBIT

## 2020-10-08 PROCEDURE — 3331090001 HH PPS REVENUE CREDIT

## 2020-10-08 PROCEDURE — G0157 HHC PT ASSISTANT EA 15: HCPCS

## 2020-10-09 PROCEDURE — 3331090002 HH PPS REVENUE DEBIT

## 2020-10-09 PROCEDURE — 3331090001 HH PPS REVENUE CREDIT

## 2020-10-10 PROCEDURE — 3331090002 HH PPS REVENUE DEBIT

## 2020-10-10 PROCEDURE — 3331090001 HH PPS REVENUE CREDIT

## 2020-10-11 PROCEDURE — 3331090001 HH PPS REVENUE CREDIT

## 2020-10-11 PROCEDURE — 3331090002 HH PPS REVENUE DEBIT

## 2020-10-12 ENCOUNTER — HOME CARE VISIT (OUTPATIENT)
Dept: SCHEDULING | Facility: HOME HEALTH | Age: 85
End: 2020-10-12
Payer: MEDICARE

## 2020-10-12 VITALS
HEART RATE: 68 BPM | SYSTOLIC BLOOD PRESSURE: 155 MMHG | OXYGEN SATURATION: 98 % | TEMPERATURE: 98.4 F | DIASTOLIC BLOOD PRESSURE: 70 MMHG | RESPIRATION RATE: 20 BRPM

## 2020-10-12 VITALS
TEMPERATURE: 98.3 F | HEART RATE: 61 BPM | RESPIRATION RATE: 20 BRPM | SYSTOLIC BLOOD PRESSURE: 153 MMHG | OXYGEN SATURATION: 98 % | DIASTOLIC BLOOD PRESSURE: 71 MMHG

## 2020-10-12 PROCEDURE — G0153 HHCP-SVS OF S/L PATH,EA 15MN: HCPCS

## 2020-10-12 PROCEDURE — 3331090002 HH PPS REVENUE DEBIT

## 2020-10-12 PROCEDURE — G0157 HHC PT ASSISTANT EA 15: HCPCS

## 2020-10-12 PROCEDURE — 3331090001 HH PPS REVENUE CREDIT

## 2020-10-13 PROCEDURE — 3331090001 HH PPS REVENUE CREDIT

## 2020-10-13 PROCEDURE — 3331090002 HH PPS REVENUE DEBIT

## 2020-10-14 ENCOUNTER — HOME CARE VISIT (OUTPATIENT)
Dept: SCHEDULING | Facility: HOME HEALTH | Age: 85
End: 2020-10-14
Payer: MEDICARE

## 2020-10-14 PROCEDURE — 3331090002 HH PPS REVENUE DEBIT

## 2020-10-14 PROCEDURE — 3331090001 HH PPS REVENUE CREDIT

## 2020-10-14 PROCEDURE — G0157 HHC PT ASSISTANT EA 15: HCPCS

## 2020-10-15 VITALS
TEMPERATURE: 98.2 F | RESPIRATION RATE: 18 BRPM | DIASTOLIC BLOOD PRESSURE: 78 MMHG | SYSTOLIC BLOOD PRESSURE: 150 MMHG | OXYGEN SATURATION: 98 % | HEART RATE: 59 BPM

## 2020-10-15 PROCEDURE — 3331090001 HH PPS REVENUE CREDIT

## 2020-10-15 PROCEDURE — 3331090002 HH PPS REVENUE DEBIT

## 2020-10-16 ENCOUNTER — HOME CARE VISIT (OUTPATIENT)
Dept: SCHEDULING | Facility: HOME HEALTH | Age: 85
End: 2020-10-16
Payer: MEDICARE

## 2020-10-16 PROCEDURE — G0153 HHCP-SVS OF S/L PATH,EA 15MN: HCPCS

## 2020-10-16 PROCEDURE — 3331090002 HH PPS REVENUE DEBIT

## 2020-10-16 PROCEDURE — 3331090001 HH PPS REVENUE CREDIT

## 2020-10-17 PROCEDURE — 3331090001 HH PPS REVENUE CREDIT

## 2020-10-17 PROCEDURE — 3331090002 HH PPS REVENUE DEBIT

## 2020-10-18 VITALS
OXYGEN SATURATION: 98 % | TEMPERATURE: 98.8 F | SYSTOLIC BLOOD PRESSURE: 165 MMHG | HEART RATE: 64 BPM | RESPIRATION RATE: 16 BRPM | DIASTOLIC BLOOD PRESSURE: 74 MMHG

## 2020-10-18 PROCEDURE — 3331090002 HH PPS REVENUE DEBIT

## 2020-10-18 PROCEDURE — 3331090001 HH PPS REVENUE CREDIT

## 2020-10-19 ENCOUNTER — HOME CARE VISIT (OUTPATIENT)
Dept: SCHEDULING | Facility: HOME HEALTH | Age: 85
End: 2020-10-19
Payer: MEDICARE

## 2020-10-19 VITALS
DIASTOLIC BLOOD PRESSURE: 70 MMHG | HEART RATE: 64 BPM | OXYGEN SATURATION: 99 % | RESPIRATION RATE: 16 BRPM | SYSTOLIC BLOOD PRESSURE: 140 MMHG | TEMPERATURE: 97.3 F

## 2020-10-19 VITALS
RESPIRATION RATE: 20 BRPM | TEMPERATURE: 98.6 F | DIASTOLIC BLOOD PRESSURE: 63 MMHG | HEART RATE: 59 BPM | OXYGEN SATURATION: 98 % | SYSTOLIC BLOOD PRESSURE: 112 MMHG

## 2020-10-19 PROCEDURE — 3331090002 HH PPS REVENUE DEBIT

## 2020-10-19 PROCEDURE — G0157 HHC PT ASSISTANT EA 15: HCPCS

## 2020-10-19 PROCEDURE — 3331090001 HH PPS REVENUE CREDIT

## 2020-10-19 PROCEDURE — G0161 HHC SLP EA 15 MIN: HCPCS

## 2020-10-20 PROCEDURE — 3331090002 HH PPS REVENUE DEBIT

## 2020-10-20 PROCEDURE — 3331090001 HH PPS REVENUE CREDIT

## 2020-10-21 PROCEDURE — 3331090002 HH PPS REVENUE DEBIT

## 2020-10-21 PROCEDURE — 3331090001 HH PPS REVENUE CREDIT

## 2020-10-22 ENCOUNTER — HOME CARE VISIT (OUTPATIENT)
Dept: SCHEDULING | Facility: HOME HEALTH | Age: 85
End: 2020-10-22
Payer: MEDICARE

## 2020-10-22 VITALS
TEMPERATURE: 97.3 F | OXYGEN SATURATION: 96 % | SYSTOLIC BLOOD PRESSURE: 140 MMHG | HEART RATE: 66 BPM | DIASTOLIC BLOOD PRESSURE: 72 MMHG | RESPIRATION RATE: 17 BRPM

## 2020-10-22 PROCEDURE — 3331090001 HH PPS REVENUE CREDIT

## 2020-10-22 PROCEDURE — 3331090002 HH PPS REVENUE DEBIT

## 2020-10-22 PROCEDURE — G0151 HHCP-SERV OF PT,EA 15 MIN: HCPCS

## 2020-10-22 PROCEDURE — G0153 HHCP-SVS OF S/L PATH,EA 15MN: HCPCS

## 2020-10-23 VITALS
SYSTOLIC BLOOD PRESSURE: 174 MMHG | OXYGEN SATURATION: 98 % | DIASTOLIC BLOOD PRESSURE: 75 MMHG | HEART RATE: 67 BPM | TEMPERATURE: 98.1 F | RESPIRATION RATE: 16 BRPM

## 2020-11-16 RX ORDER — CLOPIDOGREL BISULFATE 75 MG/1
TABLET ORAL
Qty: 90 TAB | Refills: 1 | Status: SHIPPED | OUTPATIENT
Start: 2020-11-16 | End: 2021-01-01 | Stop reason: SDUPTHER

## 2020-11-20 NOTE — TELEPHONE ENCOUNTER
Geno called back and does not need any information.
Geno called from Home health asking for the signed face to face visit on 09/09/20. I explained that we use electronic medical records and that they should have access with in [employer] as they are [facility] [employer]. She stated that she needed the actual office note with a physical signature from the provider.
Swelling that gets worse/Bleeding that does not stop/Wound/Surgical Site with redness, or foul smelling discharge or pus/Inability to tolerate liquids or foods/Pain not relieved by Medications/Nausea and vomiting that does not stop

## 2020-12-28 NOTE — PROGRESS NOTES
Consent:  
Maricel Donahue, who was seen by synchronous (real-time) audio-video technology, and/or her healthcare decision maker, is aware that this patient-initiated, Telehealth encounter on 12/28/2020 is a billable service, with coverage as determined by her insurance carrier. She is aware that she may receive a bill and has provided verbal consent to proceed: yes. Melanie Pratt SUBJECTIVE: 
Maricel Donahue is a 80y.o. year old female Chief Complaint Patient presents with  Dementia  Hypertension  Cholesterol Problem History of Present Illness: In march 2020, she started having problems finding things, forgetting to take medication, having problem remembering and also having some cognition problems at times. She has history of old stroke with problem ambulating. There is no change in her gait or speech. She has not developed any focal weakness. The work-up showed MRI with subacute frontal lobe stroke and many chronic infarcts. In agreement with her daughter, the patient was started on Aricept. She is tolerating well and there has been improvement in that mental status. Onetime few months ago in @ 6 AM, she wandered to her neighbor's house still in her pajamas. It has not happened since. She is now her usual self. Her family is no longer concerned. Her blood pressure was running good at home. However, her HCTZ has been discontinued for months by the family. Her BP was mildly elevated at her cardiologist's office. Her daughter sys that it was high because she does not take her medications regularly. To help with stroke prevention her simvastatin was increased and she has been tolerating it without any problem. Her ASHD is controlled. No angina. She is also seeing cardiologist. 
 
She is following ADA diet for DM II. Not checking her glucose regularly; but her hemoglobin A1c has been @7.0 No Systemic, Cardiovascular or Respiratory symptoms. Past Medical History: Diagnosis Date  Arteriosclerotic heart disease (ASHD)  CKD (chronic kidney disease), stage III ([facility] [address] 6/23/2015  CVA (cerebral infarction)  Diabetes ([facility]  Gout 9/23/13 Left Foot  Hypertension  Leg weakness, bilateral 3/18/13 No past surgical history on file. Current Outpatient Medications Medication Sig  
 hydroCHLOROthiazide (HYDRODIURIL) 12.5 mg tablet take 1/2 tablet by mouth once daily  metoprolol tartrate (LOPRESSOR) 50 mg tablet take 1/2 tablet by mouth twice a day  clopidogreL (PLAVIX) 75 mg tab take 1 tablet by mouth once daily  amLODIPine (NORVASC) 5 mg tablet take 2 tablets by mouth once daily  donepeziL (ARICEPT) 10 mg tablet take 1 tablet by mouth nightly  simvastatin (ZOCOR) 10 mg tablet Take 1 Tab by mouth nightly.  Walker misc Walker for aid in ambulation and preventing falls.  glucose blood VI test strips (BLOOD GLUCOSE TEST) strip Check glucose once daily. Re: DM .00  Blood-Glucose Meter monitoring kit Check glucose once daily. Re: DM .00  
 meclizine (ANTIVERT) 25 mg tablet Take 25 mg by mouth three (3) times daily.  Lancets misc Check glucose once daily. Re: DM .00  aspirin delayed-release 81 mg tablet Take 81 mg by mouth daily. No current facility-administered medications for this visit. Not taking HCTZ. Allergies Allergen Reactions  Valsartan Anaphylaxis Review of Systems:  
Constitutional: No fever, chills, night sweats, malaise. Cardiovascular: No angina, palpitations, PND, orthopnea, lightheadedness, edema, claudication. Nephrology: Stable CKD 3. Respiratory: No dyspnea, wheeze, pleurisy, hemoptysis, unusual cough or sputum. Gastrointestinal: no nausea/ vomiting, bowel habit change, pain, LI symptoms, melena, hematochezia, anorexia. Psychiatric: Memory and cognition problem as above. No agitation, suicidal or homicidal ideation. Neurological: History of multiple strokes. No seizures, numbness, acute speech abnormality, incontinence. Endocrine: Diabetes as above. OBJECTIVE: 
Physical Exam:  
 
Constitutional: General Appearance:  Appears well-developed and well nourished. Nontoxic, in no acute distress. Mental status:  Alert and awake. Oriented to person/place/time. Able to follow commands. Eyes:   Sclera  Normal. 
HENT:  Normocephalic, atraumatic. No Facial Asymmetry. No gaze palsy Pulmonary: Respiratory effort: normal; no dyspnea. Neurological:   Speech normal.   
 
Psychiatric:  Pleasant and cooperative. Normal Affect. No Hallucinations. Musculoskeletal[de-identified] neck is supple. Lab Results Component Value Date/Time WBC 8.3 03/10/2020 02:26 PM  
 HGB 13.5 03/10/2020 02:26 PM  
 HCT 41.5 03/10/2020 02:26 PM  
 PLATELET 649 75/33/0556 02:26 PM  
 MCV 77.4 03/10/2020 02:26 PM  
 
 
Lab Results Component Value Date/Time Sodium 141 09/15/2020 08:50 AM  
 Potassium 4.6 09/15/2020 08:50 AM  
 Chloride 110 09/15/2020 08:50 AM  
 CO2 27 09/15/2020 08:50 AM  
 Anion gap 4 09/15/2020 08:50 AM  
 Glucose 122 (H) 09/15/2020 08:50 AM  
 BUN 27 (H) 09/15/2020 08:50 AM  
 Creatinine 1.27 09/15/2020 08:50 AM  
 BUN/Creatinine ratio 21 (H) 09/15/2020 08:50 AM  
 GFR est AA 48 (L) 09/15/2020 08:50 AM  
 GFR est non-AA 40 (L) 09/15/2020 08:50 AM  
 Calcium 9.0 09/15/2020 08:50 AM  
 Bilirubin, total 1.3 (H) 09/15/2020 08:50 AM  
 Alk. phosphatase 68 09/15/2020 08:50 AM  
 Protein, total 7.2 09/15/2020 08:50 AM  
 Albumin 3.3 (L) 09/15/2020 08:50 AM  
 Globulin 3.9 09/15/2020 08:50 AM  
 A-G Ratio 0.8 09/15/2020 08:50 AM  
 ALT (SGPT) 20 09/15/2020 08:50 AM  
 
Lab Results Component Value Date/Time TSH 2.46 03/10/2020 02:26 PM  
 
Lab Results Component Value Date/Time  Cholesterol, total 115 09/15/2020 08:50 AM  
 HDL Cholesterol 62 (H) 09/15/2020 08:50 AM  
 LDL, calculated 26.4 09/15/2020 08:50 AM  
 VLDL, calculated 26.6 09/15/2020 08:50 AM  
 Triglyceride 133 09/15/2020 08:50 AM  
 CHOL/HDL Ratio 1.9 09/15/2020 08:50 AM  
 
Lab Results Component Value Date/Time Hemoglobin A1c 7.1 (H) 09/15/2020 08:50 AM  
 Hemoglobin A1c (POC) 6.4 01/16/2020 10:57 AM  
 
 
Lab Results Component Value Date/Time TSH 2.46 03/10/2020 02:26 PM  
 
Lab Results Component Value Date/Time Vitamin B12 450 03/10/2020 02:26 PM  
 
MRI of head without contrast 3/20/2020: 
Small subacute stroke in the right lateral frontal lobe with petechial hemorrhagic conversion. Multifocal chronic infarcts in the right frontal lobe and bilateral cerebellum. Additional chronic deep cerebral lacunar infarcts on the left. Moderately extensive amount of chronic microvascular ischemic disease. 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 ASSESSMENT:  
 
1. Multi-infarct dementia with behavior disturbance ([facility] [address] 2. Essential hypertension 3. Type 2 diabetes with nephropathy (HCC) 4. Hyperlipidemia, unspecified hyperlipidemia type PLAN:  
 
Pharmacologic Management: Medications reviewed with the patient. Taking medications regularly and if BP still remins high, consider starting back on HCTZ 12.5 1/2 daily. Orders Placed This Encounter  HEMOGLOBIN A1C WITH EAG Standing Status:   Future Standing Expiration Date:   12/29/2021  CBC WITH AUTOMATED DIFF Standing Status:   Future Standing Expiration Date:   12/29/2021  METABOLIC PANEL, COMPREHENSIVE Standing Status:   Future Standing Expiration Date:   12/29/2021  URINALYSIS W/ RFLX MICROSCOPIC Standing Status:   Future Standing Expiration Date:   12/28/2021  MICROALBUMIN, UR, RAND W/ MICROALB/CREAT RATIO Standing Status:   Future Standing Expiration Date:   12/29/2021 To get the lab work @3/15/2021, before next OV with PCP. Discussed risk of poorly controlled hypertension. Discussed DDx, follow-up & work-up. Discussed risk/benefit & side effect of treatment. Low salt ADA diet & graduated excecise. Recommend regular home BP and glucose monitoring. Health risk from non adherence discussed. Patient/ daughter voiced understanding. Follow-up and Dispositions · Return in about 3 months (around 3/28/2021). Cullen Mccauley is a 80 y.o. female who was evaluated by a video visit encounter for concerns as above. A caregiver was present when appropriate. Due to this being a TeleHealth encounter (During UNURK-03 public health emergency), evaluation of the following organ systems was limited: Vitals/Constitutional/EENT/Resp/CV/GI//MS/Neuro/Skin/Heme-Lymph-Imm. Pursuant to the emergency declaration under the [facility], 1135 waiver authority and the [employer] Act, this Virtual  Visit was conducted, with patient's (and/or legal guardian's) consent, to reduce the patient's risk of exposure to COVID-19 and provide necessary medical care. Services were provided through a video synchronous discussion virtually to substitute for in-person clinic visit. Patient and provider were located at their individual homes.  
 
 
 
Juju Miner MD

## 2021-01-01 ENCOUNTER — VIRTUAL VISIT (OUTPATIENT)
Dept: FAMILY MEDICINE CLINIC | Age: 86
End: 2021-01-01
Payer: MEDICARE

## 2021-01-01 ENCOUNTER — PATIENT OUTREACH (OUTPATIENT)
Dept: CASE MANAGEMENT | Age: 86
End: 2021-01-01

## 2021-01-01 ENCOUNTER — HOSPITAL ENCOUNTER (OUTPATIENT)
Dept: LAB | Age: 86
Discharge: HOME | End: 2021-03-05
Payer: MEDICARE

## 2021-01-01 ENCOUNTER — OFFICE VISIT (OUTPATIENT)
Dept: FAMILY MEDICINE CLINIC | Age: 86
End: 2021-01-01
Payer: MEDICARE

## 2021-01-01 ENCOUNTER — TELEPHONE (OUTPATIENT)
Dept: FAMILY MEDICINE CLINIC | Age: 86
End: 2021-01-01

## 2021-01-01 ENCOUNTER — HOSPITAL ENCOUNTER (OUTPATIENT)
Dept: LAB | Age: 86
Discharge: HOME | End: 2021-07-13
Payer: MEDICARE

## 2021-01-01 ENCOUNTER — HOSPITAL ENCOUNTER (OUTPATIENT)
Dept: NUTRITION | Age: 86
Discharge: HOME | End: 2021-10-07
Payer: MEDICARE

## 2021-01-01 VITALS
RESPIRATION RATE: 16 BRPM | SYSTOLIC BLOOD PRESSURE: 135 MMHG | DIASTOLIC BLOOD PRESSURE: 71 MMHG | WEIGHT: 137 LBS | HEIGHT: 61 IN | HEART RATE: 64 BPM | TEMPERATURE: 97.7 F | OXYGEN SATURATION: 99 % | BODY MASS INDEX: 25.86 KG/M2

## 2021-01-01 VITALS
DIASTOLIC BLOOD PRESSURE: 60 MMHG | BODY MASS INDEX: 23.41 KG/M2 | WEIGHT: 124 LBS | HEART RATE: 58 BPM | SYSTOLIC BLOOD PRESSURE: 136 MMHG | RESPIRATION RATE: 17 BRPM | TEMPERATURE: 97.8 F | OXYGEN SATURATION: 98 % | HEIGHT: 61 IN

## 2021-01-01 VITALS
RESPIRATION RATE: 16 BRPM | HEIGHT: 61 IN | BODY MASS INDEX: 27.94 KG/M2 | SYSTOLIC BLOOD PRESSURE: 158 MMHG | DIASTOLIC BLOOD PRESSURE: 65 MMHG | OXYGEN SATURATION: 96 % | HEART RATE: 60 BPM | WEIGHT: 148 LBS | TEMPERATURE: 98.1 F

## 2021-01-01 DIAGNOSIS — Z13.820 OSTEOPOROSIS SCREENING: ICD-10-CM

## 2021-01-01 DIAGNOSIS — R63.4 WEIGHT LOSS: ICD-10-CM

## 2021-01-01 DIAGNOSIS — E11.21 TYPE 2 DIABETES WITH NEPHROPATHY (HCC): Primary | ICD-10-CM

## 2021-01-01 DIAGNOSIS — F01.518 MULTI-INFARCT DEMENTIA WITH BEHAVIOR DISTURBANCE: ICD-10-CM

## 2021-01-01 DIAGNOSIS — E11.21 TYPE 2 DIABETES WITH NEPHROPATHY (HCC): ICD-10-CM

## 2021-01-01 DIAGNOSIS — Z74.09 MOBILITY POOR: ICD-10-CM

## 2021-01-01 DIAGNOSIS — C18.0 MALIGNANT NEOPLASM OF CECUM (HCC): ICD-10-CM

## 2021-01-01 DIAGNOSIS — Z23 NEEDS FLU SHOT: ICD-10-CM

## 2021-01-01 DIAGNOSIS — B37.2 CUTANEOUS CANDIDIASIS: ICD-10-CM

## 2021-01-01 DIAGNOSIS — I50.9 CHRONIC CONGESTIVE HEART FAILURE, UNSPECIFIED HEART FAILURE TYPE (HCC): ICD-10-CM

## 2021-01-01 DIAGNOSIS — F01.518 MULTI-INFARCT DEMENTIA WITH BEHAVIOR DISTURBANCE: Primary | ICD-10-CM

## 2021-01-01 DIAGNOSIS — Z09 HOSPITAL DISCHARGE FOLLOW-UP: Primary | ICD-10-CM

## 2021-01-01 DIAGNOSIS — I35.0 NONRHEUMATIC AORTIC VALVE STENOSIS: ICD-10-CM

## 2021-01-01 DIAGNOSIS — Z00.00 MEDICARE ANNUAL WELLNESS VISIT, SUBSEQUENT: ICD-10-CM

## 2021-01-01 DIAGNOSIS — M79.641 RIGHT HAND PAIN: Primary | ICD-10-CM

## 2021-01-01 DIAGNOSIS — F01.50 MULTI-INFARCT DEMENTIA WITHOUT BEHAVIORAL DISTURBANCE (HCC): Primary | ICD-10-CM

## 2021-01-01 DIAGNOSIS — Z78.0 POST-MENOPAUSAL: ICD-10-CM

## 2021-01-01 DIAGNOSIS — Z12.31 ENCOUNTER FOR SCREENING MAMMOGRAM FOR MALIGNANT NEOPLASM OF BREAST: ICD-10-CM

## 2021-01-01 DIAGNOSIS — E78.5 HYPERLIPIDEMIA, UNSPECIFIED HYPERLIPIDEMIA TYPE: ICD-10-CM

## 2021-01-01 DIAGNOSIS — R29.898 LEG WEAKNESS, BILATERAL: ICD-10-CM

## 2021-01-01 DIAGNOSIS — M10.9 ACUTE GOUT, UNSPECIFIED CAUSE, UNSPECIFIED SITE: Primary | ICD-10-CM

## 2021-01-01 DIAGNOSIS — Z12.39 BREAST SCREENING: ICD-10-CM

## 2021-01-01 DIAGNOSIS — Z23 ENCOUNTER FOR IMMUNIZATION: ICD-10-CM

## 2021-01-01 DIAGNOSIS — I10 ESSENTIAL HYPERTENSION: ICD-10-CM

## 2021-01-01 DIAGNOSIS — R63.6 UNDERWEIGHT DUE TO INADEQUATE CALORIC INTAKE: ICD-10-CM

## 2021-01-01 DIAGNOSIS — L89.109 PRESSURE INJURY OF SKIN OF BACK, UNSPECIFIED INJURY STAGE: Primary | ICD-10-CM

## 2021-01-01 DIAGNOSIS — R06.02 SOB (SHORTNESS OF BREATH): Primary | ICD-10-CM

## 2021-01-01 LAB
ALBUMIN SERPL-MCNC: 3.2 G/DL (ref 3.4–5)
ALBUMIN/GLOB SERPL: 0.8 {RATIO} (ref 0.8–1.7)
ALP SERPL-CCNC: 67 U/L (ref 45–117)
ALT SERPL-CCNC: 16 U/L (ref 13–56)
ANION GAP SERPL CALC-SCNC: 5 MMOL/L (ref 3–18)
AST SERPL-CCNC: 13 U/L (ref 10–38)
BASOPHILS # BLD: 0 K/UL (ref 0–0.1)
BASOPHILS NFR BLD: 0 % (ref 0–2)
BILIRUB SERPL-MCNC: 0.6 MG/DL (ref 0.2–1)
BUN SERPL-MCNC: 24 MG/DL (ref 7–18)
BUN/CREAT SERPL: 15 (ref 12–20)
CALCIUM SERPL-MCNC: 8.9 MG/DL (ref 8.5–10.1)
CHLORIDE SERPL-SCNC: 110 MMOL/L (ref 100–111)
CO2 SERPL-SCNC: 28 MMOL/L (ref 21–32)
CREAT SERPL-MCNC: 1.57 MG/DL (ref 0.6–1.3)
CREATININE, EXTERNAL: 2.1
DIFFERENTIAL METHOD BLD: ABNORMAL
EOSINOPHIL # BLD: 0.2 K/UL (ref 0–0.4)
EOSINOPHIL NFR BLD: 2 % (ref 0–5)
ERYTHROCYTE [DISTWIDTH] IN BLOOD BY AUTOMATED COUNT: 16 % (ref 11.6–14.5)
EST. AVERAGE GLUCOSE BLD GHB EST-MCNC: 148 MG/DL
EST. AVERAGE GLUCOSE BLD GHB EST-MCNC: 154 MG/DL
GLOBULIN SER CALC-MCNC: 3.8 G/DL (ref 2–4)
GLUCOSE SERPL-MCNC: 130 MG/DL (ref 74–99)
HBA1C MFR BLD: 6.8 % (ref 4.2–5.6)
HBA1C MFR BLD: 7 % (ref 4.2–5.6)
HCT VFR BLD AUTO: 39.8 % (ref 35–45)
HGB BLD-MCNC: 12.4 G/DL (ref 12–16)
LYMPHOCYTES # BLD: 1.9 K/UL (ref 0.9–3.6)
LYMPHOCYTES NFR BLD: 22 % (ref 21–52)
MCH RBC QN AUTO: 24.8 PG (ref 24–34)
MCHC RBC AUTO-ENTMCNC: 31.2 G/DL (ref 31–37)
MCV RBC AUTO: 79.6 FL (ref 74–97)
MONOCYTES # BLD: 0.7 K/UL (ref 0.05–1.2)
MONOCYTES NFR BLD: 8 % (ref 3–10)
NEUTS SEG # BLD: 5.7 K/UL (ref 1.8–8)
NEUTS SEG NFR BLD: 68 % (ref 40–73)
PLATELET # BLD AUTO: 259 K/UL (ref 135–420)
PMV BLD AUTO: 11.7 FL (ref 9.2–11.8)
POTASSIUM SERPL-SCNC: 5.2 MMOL/L (ref 3.5–5.5)
PROT SERPL-MCNC: 7 G/DL (ref 6.4–8.2)
RBC # BLD AUTO: 5 M/UL (ref 4.2–5.3)
SODIUM SERPL-SCNC: 143 MMOL/L (ref 136–145)
WBC # BLD AUTO: 8.5 K/UL (ref 4.6–13.2)

## 2021-01-01 PROCEDURE — 1101F PT FALLS ASSESS-DOCD LE1/YR: CPT | Performed by: PHYSICIAN ASSISTANT

## 2021-01-01 PROCEDURE — 85025 COMPLETE CBC W/AUTO DIFF WBC: CPT

## 2021-01-01 PROCEDURE — 1090F PRES/ABSN URINE INCON ASSESS: CPT | Performed by: PHYSICIAN ASSISTANT

## 2021-01-01 PROCEDURE — G8420 CALC BMI NORM PARAMETERS: HCPCS | Performed by: PHYSICIAN ASSISTANT

## 2021-01-01 PROCEDURE — 99214 OFFICE O/P EST MOD 30 MIN: CPT | Performed by: PHYSICIAN ASSISTANT

## 2021-01-01 PROCEDURE — G8536 NO DOC ELDER MAL SCRN: HCPCS | Performed by: PHYSICIAN ASSISTANT

## 2021-01-01 PROCEDURE — G8427 DOCREV CUR MEDS BY ELIG CLIN: HCPCS | Performed by: PHYSICIAN ASSISTANT

## 2021-01-01 PROCEDURE — 36415 COLL VENOUS BLD VENIPUNCTURE: CPT

## 2021-01-01 PROCEDURE — G8432 DEP SCR NOT DOC, RNG: HCPCS | Performed by: PHYSICIAN ASSISTANT

## 2021-01-01 PROCEDURE — 83036 HEMOGLOBIN GLYCOSYLATED A1C: CPT

## 2021-01-01 PROCEDURE — 97802 MEDICAL NUTRITION INDIV IN: CPT

## 2021-01-01 PROCEDURE — G0008 ADMIN INFLUENZA VIRUS VAC: HCPCS | Performed by: PHYSICIAN ASSISTANT

## 2021-01-01 PROCEDURE — 3051F HG A1C>EQUAL 7.0%<8.0%: CPT | Performed by: PHYSICIAN ASSISTANT

## 2021-01-01 PROCEDURE — G8419 CALC BMI OUT NRM PARAM NOF/U: HCPCS | Performed by: PHYSICIAN ASSISTANT

## 2021-01-01 PROCEDURE — 80053 COMPREHEN METABOLIC PANEL: CPT

## 2021-01-01 PROCEDURE — G0439 PPPS, SUBSEQ VISIT: HCPCS | Performed by: PHYSICIAN ASSISTANT

## 2021-01-01 PROCEDURE — 99495 TRANSJ CARE MGMT MOD F2F 14D: CPT | Performed by: PHYSICIAN ASSISTANT

## 2021-01-01 PROCEDURE — 90694 VACC AIIV4 NO PRSRV 0.5ML IM: CPT | Performed by: PHYSICIAN ASSISTANT

## 2021-01-01 RX ORDER — LANOLIN ALCOHOL/MO/W.PET/CERES
400 CREAM (GRAM) TOPICAL DAILY
Qty: 30 TABLET | Refills: 2 | Status: SHIPPED | OUTPATIENT
Start: 2021-01-01

## 2021-01-01 RX ORDER — NYSTATIN 100000 U/G
CREAM TOPICAL 2 TIMES DAILY
Qty: 30 G | Refills: 1 | Status: SHIPPED | OUTPATIENT
Start: 2021-01-01 | End: 2021-01-01 | Stop reason: SDUPTHER

## 2021-01-01 RX ORDER — BUDESONIDE 0.5 MG/2ML
0.5 INHALANT ORAL
COMMUNITY
Start: 2021-01-01 | End: 2021-01-01

## 2021-01-01 RX ORDER — CLOPIDOGREL BISULFATE 75 MG/1
TABLET ORAL
Qty: 90 TABLET | Refills: 1 | Status: SHIPPED | OUTPATIENT
Start: 2021-01-01

## 2021-01-01 RX ORDER — PREDNISONE 20 MG/1
TABLET ORAL
Qty: 10 TABLET | Refills: 0 | Status: SHIPPED | OUTPATIENT
Start: 2021-01-01

## 2021-01-01 RX ORDER — DONEPEZIL HYDROCHLORIDE 10 MG/1
10 TABLET, FILM COATED ORAL
Qty: 90 TABLET | Refills: 3 | Status: SHIPPED | OUTPATIENT
Start: 2021-01-01

## 2021-01-01 RX ORDER — BUMETANIDE 2 MG/1
2 TABLET ORAL 2 TIMES DAILY
COMMUNITY
Start: 2021-01-01 | End: 2021-01-01

## 2021-01-01 RX ORDER — METOPROLOL TARTRATE 50 MG/1
TABLET ORAL
Qty: 90 TABLET | Refills: 0 | Status: SHIPPED | OUTPATIENT
Start: 2021-01-01

## 2021-01-01 RX ORDER — CALCIUM CITRATE/VITAMIN D3 200MG-6.25
TABLET ORAL
Qty: 100 STRIP | Refills: 1 | Status: SHIPPED | OUTPATIENT
Start: 2021-01-01

## 2021-01-01 RX ORDER — BUMETANIDE 2 MG/1
TABLET ORAL
Qty: 60 TABLET | Refills: 2 | Status: SHIPPED | OUTPATIENT
Start: 2021-01-01

## 2021-01-01 RX ORDER — AMLODIPINE BESYLATE 5 MG/1
TABLET ORAL
Qty: 180 TAB | Refills: 1 | Status: SHIPPED | OUTPATIENT
Start: 2021-01-01

## 2021-01-01 RX ORDER — LANOLIN ALCOHOL/MO/W.PET/CERES
400 CREAM (GRAM) TOPICAL DAILY
COMMUNITY
Start: 2021-01-01 | End: 2021-01-01

## 2021-01-01 RX ORDER — SIMVASTATIN 10 MG/1
TABLET, FILM COATED ORAL
Qty: 90 TABLET | Refills: 1 | Status: SHIPPED | OUTPATIENT
Start: 2021-01-01

## 2021-01-01 RX ORDER — IPRATROPIUM BROMIDE AND ALBUTEROL SULFATE 2.5; .5 MG/3ML; MG/3ML
SOLUTION RESPIRATORY (INHALATION)
COMMUNITY
Start: 2021-01-01

## 2021-01-01 RX ORDER — FUROSEMIDE 20 MG/1
20 TABLET ORAL DAILY
COMMUNITY
Start: 2021-01-01 | End: 2021-01-01

## 2021-01-01 RX ORDER — NYSTATIN 100000 U/G
CREAM TOPICAL 2 TIMES DAILY
Qty: 30 G | Refills: 1 | Status: SHIPPED | OUTPATIENT
Start: 2021-01-01

## 2021-01-01 RX ORDER — BUMETANIDE 2 MG/1
TABLET ORAL
Qty: 60 TABLET | Refills: 2 | Status: SHIPPED | OUTPATIENT
Start: 2021-01-01 | End: 2021-01-01

## 2021-01-01 RX ORDER — GLIPIZIDE 2.5 MG/1
2.5 TABLET, EXTENDED RELEASE ORAL DAILY
COMMUNITY
Start: 2021-01-01 | End: 2021-01-01

## 2021-01-01 RX ORDER — METOPROLOL TARTRATE 50 MG/1
TABLET ORAL
Qty: 90 TABLET | Refills: 0 | Status: SHIPPED | OUTPATIENT
Start: 2021-01-01 | End: 2021-01-01

## 2021-01-01 RX ORDER — MULTIPLE VITAMINS W/ MINERALS TAB 9MG-400MCG
TAB ORAL
COMMUNITY
Start: 2021-01-01

## 2021-01-01 RX ORDER — SILVER SULFADIAZINE 10 G/1000G
CREAM TOPICAL DAILY
Qty: 50 G | Refills: 0 | Status: SHIPPED | OUTPATIENT
Start: 2021-01-01 | End: 2021-01-01

## 2021-01-01 RX ORDER — LANOLIN ALCOHOL/MO/W.PET/CERES
CREAM (GRAM) TOPICAL
Qty: 30 TABLET | Refills: 2 | Status: SHIPPED | OUTPATIENT
Start: 2021-01-01 | End: 2021-01-01 | Stop reason: SDUPTHER

## 2021-01-01 RX ORDER — SIMVASTATIN 10 MG/1
TABLET, FILM COATED ORAL
Qty: 90 TAB | Refills: 1 | Status: SHIPPED | OUTPATIENT
Start: 2021-01-01 | End: 2021-01-01 | Stop reason: SDUPTHER

## 2021-01-01 NOTE — MR AVS SNAPSHOT
[address] [address] 
[phone] Patient: Chinedu Kang MRN: [id number] Visit Information Date & Time Provider Department Dept. Phone Encounter #  
 8/29/2018 11:00 AM Candace Dodge MD [address] [phone] [id number] Follow-up Instructions Return in about 3 months (around 11/29/2018). Upcoming Health Maintenance Date Due  
 EYE EXAM RETINAL OR DILATED Q1 5/24/2018 Influenza Age 5 to Adult 8/1/2018 FOOT EXAM Q1 8/23/2018 MICROALBUMIN Q1 8/23/2018 LIPID PANEL Q1 8/23/2018 MEDICARE YEARLY EXAM 11/30/2018 HEMOGLOBIN A1C Q6M 11/30/2018 GLAUCOMA SCREENING Q2Y 5/24/2019 DTaP/Tdap/Td series (2 - Tdap) 2/24/2027 Allergies as of 8/29/2018  Review Complete On: 8/29/2018 By: Candace Dodge MD  
  
 Severity Noted Reaction Type Reactions Valsartan High 07/12/2017    Anaphylaxis Current Immunizations  Reviewed on 3/6/2018 Name Date DTaP 2/24/2017 Influenza High Dose Vaccine PF 8/24/2017  9:30 AM  
 Influenza Vaccine (Quad) PF 12/21/2016 10:55 AM, 9/23/2015  9:50 AM  
 Pneumococcal Conjugate (PCV-13) 11/5/2015 12:37 PM  
 Pneumococcal Polysaccharide (PPSV-23) 11/23/2016 Not reviewed this visit You Were Diagnosed With   
  
 Codes Comments Type 2 diabetes with nephropathy (HCC)    -  Primary ICD-10-CM: E11.21 
ICD-9-CM: 250.40, 583.81 Essential hypertension     ICD-10-CM: I10 
ICD-9-CM: 401.9 Overweight     ICD-10-CM: A94.2 ICD-9-CM: 278.02 Idiopathic chronic gout of foot without tophus, unspecified laterality     ICD-10-CM: M1A.0790 ICD-9-CM: 274.02 Vitals BP Pulse Temp Resp Height(growth percentile) Weight(growth percentile)  130/86 (BP 1 Location: Right arm, BP Patient Position: Sitting) 62 97.5 °F (36.4 °C) (Oral) 14 5' 1\" (1.549 m) 140 lb 3.2 oz (63.6 kg) SpO2 BMI OB Status Smoking Status 98% 26.49 kg/m2 Menopause Former Smoker Vitals History BMI and BSA Data Body Mass Index Body Surface Area  
 26.49 kg/m 2 1.65 m 2 Preferred Pharmacy Pharmacy Name Phone [id number] [address]  [phone] Your Updated Medication List  
  
   
This list is accurate as of 8/29/18 12:16 PM.  Always use your most recent med list. amLODIPine 5 mg tablet Commonly known as:  Cramer Cater Take 2 Tabs by mouth daily. aspirin delayed-release 81 mg tablet Take 81 mg by mouth daily. Blood-Glucose Meter monitoring kit Check glucose once daily. Re: DM .00  
  
 clopidogrel 75 mg Tab Commonly known as:  PLAVIX Take 1 Tab by mouth daily. glucose blood VI test strips strip Commonly known as:  blood glucose test  
Check glucose once daily. Re: DM .00 Lancets Misc Check glucose once daily. Re: DM .00  
  
 meclizine 25 mg tablet Commonly known as:  ANTIVERT Take 25 mg by mouth three (3) times daily. metoprolol tartrate 50 mg tablet Commonly known as:  LOPRESSOR Take 0.5 Tabs by mouth two (2) times a day. simvastatin 5 mg tablet Commonly known as:  ZOCOR  
take 1 tablet by mouth at bedtime Follow-up Instructions Return in about 3 months (around 11/29/2018). To-Do List   
 08/29/2018 Lab:  HEMOGLOBIN A1C WITH EAG   
  
 08/29/2018 Lab:  LIPID PANEL   
  
 08/29/2018 Lab:  MICROALBUMIN, UR, RAND W/ MICROALB/CREAT RATIO   
  
 08/29/2018 Lab:  TSH 3RD GENERATION   
  
 08/29/2018 Lab:  URIC ACID Patient Instructions Schedule of Personalized Health Plan for [facility] (Provide Copy to Patient) 08/29/18 The best way to stay healthy is to live a healthy lifestyle.  A healthy 12 lifestyle includes regular exercise, eating a well-balanced diet, keeping a healthy weight and not smoking. Regular physical exams and screening tests are another important way to take care of yourself. Preventive exams provided by health care providers can find health problems early when treatment works best and can keep you from getting certain diseases or illnesses. Preventive services include exams, lab tests, screenings, shots, monitoring and information to help you take care of your own health. All people over 65 should have a pneumonia shot. Pneumonia shots are usually only needed once in a lifetime unless your doctor decides differently. All people over 65 should have a yearly flu shot. People over 65 are at medium to high risk for Hepatitis B. Three shots are needed for complete protection. In addition to your physical exam, some screening tests are recommended: 
 
Bone mass measurement (dexa scan) is recommended every two years Diabetes Mellitus screening is recommended every year. Glaucoma is an eye disease caused by high pressure in the eye. An eye exam is recommended every year. Cardiovascular screening tests that check your cholesterol and other blood fat (lipid) levels are recommended every five years. Colorectal Cancer screening tests help to find pre-cancerous polyps (growths in the colon) so they can be removed before they turn into cancer. Tests ordered for screening depend on your personal and family history risk factors. Screening for Breast Cancer is recommended yearly with a mammogram. 
 
Screening for Cervical Cancer is recommended every two years (annually for certain risk factors, such as previous history of STD or abnormal PAP in past 7 years), with a Pelvic Exam with PAP Here is a list of your current Health Maintenance items with a due date: 
Health Maintenance Topic Date Due  
 EYE EXAM RETINAL OR DILATED Q1  05/24/2018  Influenza Age 5 to Adult  08/01/2018  
 FOOT EXAM Q1  08/23/2018  MICROALBUMIN Q1  08/23/2018  LIPID PANEL Q1  08/23/2018  MEDICARE YEARLY EXAM  11/30/2018  HEMOGLOBIN A1C Q6M  11/30/2018  GLAUCOMA SCREENING Q2Y  05/24/2019  
 DTaP/Tdap/Td series (2 - Tdap) 02/24/2027  Bone Densitometry (Dexa) Screening  Completed  ZOSTER VACCINE AGE 60>  Addressed  Pneumococcal 65+ Low/Medium Risk  Completed Preventing Falls: Care Instructions Your Care Instructions Getting around your home safely can be a challenge if you have injuries or health problems that make it easy for you to fall. Loose rugs and furniture in walkways are among the dangers for many older people who have problems walking or who have poor eyesight. People who have conditions such as arthritis, osteoporosis, or dementia also have to be careful not to fall. You can make your home safer with a few simple measures. Follow-up care is a key part of your treatment and safety. Be sure to make and go to all appointments, and call your doctor if you are having problems. It's also a good idea to know your test results and keep a list of the medicines you take. How can you care for yourself at home? Taking care of yourself · You may get dizzy if you do not drink enough water. To prevent dehydration, drink plenty of fluids, enough so that your urine is light yellow or clear like water. Choose water and other caffeine-free clear liquids. If you have kidney, heart, or liver disease and have to limit fluids, talk with your doctor before you increase the amount of fluids you drink. · Exercise regularly to improve your strength, muscle tone, and balance. Walk if you can. Swimming may be a good choice if you cannot walk easily. · Have your vision and hearing checked each year or any time you notice a change. If you have trouble seeing and hearing, you might not be able to avoid objects and could lose your balance. · Know the side effects of the medicines you take. Ask your doctor or pharmacist whether the medicines you take can affect your balance. Sleeping pills or sedatives can affect your balance. · Limit the amount of alcohol you drink. Alcohol can impair your balance and other senses. · Ask your doctor whether calluses or corns on your feet need to be removed. If you wear loose-fitting shoes because of calluses or corns, you can lose your balance and fall. · Talk to your doctor if you have numbness in your feet. Preventing falls at home · Remove raised doorway thresholds, throw rugs, and clutter. Repair loose carpet or raised areas in the floor. · Move furniture and electrical cords to keep them out of walking paths. · Use nonskid floor wax, and wipe up spills right away, especially on ceramic tile floors. · If you use a walker or cane, put rubber tips on it. If you use crutches, clean the bottoms of them regularly with an abrasive pad, such as steel wool. · Keep your house well lit, especially Jennifer Tyler, and outside walkways. Use night-lights in areas such as hallways and bathrooms. Add extra light switches or use remote switches (such as switches that go on or off when you clap your hands) to make it easier to turn lights on if you have to get up during the night. · Install sturdy handrails on stairways. · Move items in your cabinets so that the things you use a lot are on the lower shelves (about waist level). · Keep a cordless phone and a flashlight with new batteries by your bed. If possible, put a phone in each of the main rooms of your house, or carry a cell phone in case you fall and cannot reach a phone. Or, you can wear a device around your neck or wrist. You push a button that sends a signal for help. · Wear low-heeled shoes that fit well and give your feet good support. Use footwear with nonskid soles.  Check the heels and soles of your shoes for wear. Repair or replace worn heels or soles. · Do not wear socks without shoes on wood floors. · Walk on the grass when the sidewalks are slippery. If you live in an area that gets snow and ice in the winter, sprinkle salt on slippery steps and sidewalks. Preventing falls in the bath · Install grab bars and nonskid mats inside and outside your shower or tub and near the toilet and sinks. · Use shower chairs and bath benches. · Use a hand-held shower head that will allow you to sit while showering. · Get into a tub or shower by putting the weaker leg in first. Get out of a tub or shower with your strong side first. 
· Repair loose toilet seats and consider installing a raised toilet seat to make getting on and off the toilet easier. · Keep your bathroom door unlocked while you are in the shower. Where can you learn more? Go to http://agnes-nirmal.info/. Enter [id number] [id number] in the search box to learn more about \"Preventing Falls: Care Instructions. \" Current as of: May 12, 2017 Content Version: 11.4 © 8546-2608 [employer]. Care instructions adapted under license by [employer] (which disclaims liability or warranty for this information). If you have questions about a medical condition or this instruction, always ask your healthcare professional. [address] [address] any warranty or liability for your use of this information. Preventing Outdoor Falls: Care Instructions Your Care Instructions Worries about falls don't need to keep you indoors. Outdoor activities like walking have big benefits for your health. You will need to watch your step and learn a few safety measures. If you are worried about having a fall outdoors, ask your doctor about exercises, classes, or physical therapy that may help. You can learn ways to gain strength, flexibility, and balance. Ask if it might help to use a cane or walker. You can make your time outdoors safer with a few simple measures. Follow-up care is a key part of your treatment and safety. Be sure to make and go to all appointments, and call your doctor if you are having problems. It's also a good idea to know your test results and keep a list of the medicines you take. How can you prevent falls outdoors? · Wear shoes with firm soles and low heels. If you have to walk on an icy surface, use grippers that can be worn over your shoes in bad weather. · Be extra careful if weather is bad. Walk on the grass when the sidewalks are slick. If you live in a place that gets snow and ice in the winter, sprinkle salt on slippery stairs and sidewalks. · Be careful getting on or off buses and trains or getting in and out of cars. If handrails are available, use them. · Be careful when you cross the street. Look for crosswalks or places where curb cuts or ramps are present. · Try not to hurry, especially if you are carrying something. · Be cautious in parking lots or garages. There may be curbs or changes in pavement, or the height of the pavement may vary. · Make sure to wear the correct eyeglasses, if you need them. Reading glasses or bifocals can make it harder to see hazards that might be in your way. · If you are walking outdoors for exercise, try to: 
¨ Walk in well-lighted, well-maintained areas. These include high school or college tracks, shopping malls, and public spaces. ¨ Walk with a partner. ¨ Watch out for cracked sidewalks, curbs, changes in the height of the pavement, exposed tree roots, and debris such as fallen leaves or branches. Where can you learn more? Go to http://agnes-nirmal.info/. Enter M698 in the search box to learn more about \"Preventing Outdoor Falls: Care Instructions. \" Current as of: May 12, 2017 Content Version: 11.4 © 0574-7473 [employer], [employer].  Care instructions adapted under license by [address] (which disclaims liability or warranty for this information). If you have questions about a medical condition or this instruction, always ask your healthcare professional. [address] any warranty or liability for your use of this information. How to Get Up Safely After a Fall: Care Instructions Your Care Instructions If you have injuries, health problems, or other reasons that may make it easy for you to fall at home, it is a good idea to learn how to get up safely after a fall. Learning how to get up correctly can help you avoid making an injury worse. Also, knowing what to do if you cannot get up can help you stay safe until help arrives. Follow-up care is a key part of your treatment and safety. Be sure to make and go to all appointments, and call your doctor if you are having problems. It's also a good idea to know your test results and keep a list of the medicines you take. How can you care for yourself after a fall? If you think you can get up First lie still for a few minutes and think about how you feel. If your body feels okay and you think you can get up safely, follow the rest of the steps below: 1. Look for a chair or other piece of furniture that is close to you. 2. Roll onto your side and rest. Roll by turning your head in the direction you want to roll, move your shoulder and arm, then hip and leg in the same direction. 3. Lie still for a moment to let your blood pressure adjust. 
4. Slowly push your upper body up, lift your head, and take a moment to rest. 
5. Slowly get up on your hands and knees, and crawl to the chair or other stable piece of furniture. 6. Put your hands on the chair. 7. Move one foot forward, and place it flat on the floor. Your other leg should be bent with the knee on the floor. 8. Rise slowly, turn your body, and sit in the chair.  Stay seated for a bit and think about how you feel. Call for help. Even if you feel okay, let someone know what happened to you. You might not know that you have a serious injury. If you cannot get up 1. If you think you are injured after a fall or you cannot get up, try not to panic. 2. Call out for help. 3. If you have a phone within reach or you have an emergency call device, use it to call for help. 4. If you do not have a phone within reach, try to slide yourself toward it. If you cannot get to the phone, try to slide toward a door or window or a place where you think you can be heard. 5. Platte or use an object to make noise so someone might hear you. 6. If you can reach something that you can use for a pillow, place it under your head. Try to stay warm by covering yourself with a blanket or clothing while you wait for help. When should you call for help? Call 911 anytime you think you may need emergency care. For example, call if: 
? · You passed out (lost consciousness). ? · You cannot get up after a fall. ? · You have severe pain. ?Call your doctor now or seek immediate medical care if: 
? · You have new or worse pain. ? · You are dizzy or lightheaded. ? · You hit your head. ? Watch closely for changes in your health, and be sure to contact your doctor if: 
? · You do not get better as expected. Where can you learn more? Go to http://agnes-nirmal.info/. Enter I430 in the search box to learn more about \"How to Get Up Safely After a Fall: Care Instructions. \" Current as of: May 12, 2017 Content Version: 11.4 © 9132-0377 [employer]. Care instructions adapted under license by [employer] (which disclaims liability or warranty for this information). If you have questions about a medical condition or this instruction, always ask your healthcare professional. [address] any warranty or liability for your use of this information. Advance Care Planning: Care Instructions Your Care Instructions It can be hard to live with an illness that cannot be cured. But if your health is getting worse, you may want to make decisions about end-of-life care. Planning for the end of your life does not mean that you are giving up. It is a way to make sure that your wishes are met. Clearly stating your wishes can make it easier for your loved ones. Making plans while you are still able may also ease your mind and make your final days less stressful and more meaningful. Follow-up care is a key part of your treatment and safety. Be sure to make and go to all appointments, and call your doctor if you are having problems. It's also a good idea to know your test results and keep a list of the medicines you take. What can you do to plan for the end of life? · You can bring these issues up with your doctor. You do not need to wait until your doctor starts the conversation. You might start with \"I would not be willing to live with . Donna Thompson Rm \" When you complete this sentence it helps your doctor understand your wishes. · Talk openly and honestly with your doctor. This is the best way to understand the decisions you will need to make as your health changes. Know that you can always change your mind. · Ask your doctor about commonly used life-support measures. These include tube feedings, breathing machines, and fluids given through a vein (IV). Understanding these treatments will help you decide whether you want them. · You may choose to have these life-supporting treatments for a limited time. This allows a trial period to see whether they will help you. You may also decide that you want your doctor to take only certain measures to keep you alive. It is important to spell out these conditions so that your doctor and family understand them. · Talk to your doctor about how long you are likely to live.  He or she may be able to give you an idea of what usually happens with your specific illness. · Think about preparing papers that state your wishes. This way there will not be any confusion about what you want. You can change your instructions at any time. Which papers should you prepare? Advance directives are legal papers that tell doctors how you want to be cared for at the end of your life. You do not need a  to write these papers. Ask your doctor or your state [facility] department for information on how to write your advance directives. They may have the forms for each of these types of papers. Make sure your doctor has a copy of these on file, and give a copy to a family member or close friend. · Consider a do-not-resuscitate order (DNR). This order asks that no extra treatments be done if your heart stops or you stop breathing. Extra treatments may include cardiopulmonary resuscitation (CPR), electrical shock to restart your heart, or a machine to breathe for you. If you decide to have a DNR order, ask your doctor to explain and write it. Place the order in your home where everyone can easily see it. · Consider a living will. A living will explains your wishes about life support and other treatments at the end of your life if you become unable to speak for yourself. Living valenzuela tell doctors to use or not use treatments that would keep you alive. You must have one or two witnesses or a notary present when you sign this form. · Consider a durable power of  for health care. This allows you to name a person to make decisions about your care if you are not able to. Most people ask a close friend or family member. Talk to this person about the kinds of treatments you want and those that you do not want. Make sure this person understands your wishes. These legal papers are simple to change. Tell your doctor what you want to change, and ask him or her to make a note in your medical file.  Give your family updated copies of the papers. Where can you learn more? Go to http://agnes-nirmal.info/. Enter P184 in the search box to learn more about \"Advance Care Planning: Care Instructions. \" Current as of: September 24, 2016 Content Version: 11.4 © 2509-5692 [employer]. Care instructions adapted under license by [employer] (which disclaims liability or warranty for this information). If you have questions about a medical condition or this instruction, always ask your healthcare professional. [address] any warranty or liability for your use of this information. Learning About Durable Power of  for Health Care What is a durable power of  for health care? A durable power of  for health care is one type of the legal forms called advance directives. It lets you decide who you want to make treatment decisions for you if you cannot speak or decide for yourself. The person you choose is called your health care agent. Another type of advance directive is a living will. It lets you write down what kinds of treatment or life support you want or do not want. What should you think about when choosing a health care agent? Choose your health care agent carefully. This person may or may not be a family member. Talk to the person before you make your final decision. Make sure he or she is comfortable with this responsibility. It's a good idea to choose someone who: · Is at least 25years old. · Knows you well and understands what makes life meaningful for you. · Understands your [employer] and moral values. · Will do what you want, not what he or she wants. · Will be able to make difficult choices at a stressful time. · Will be able to refuse or stop treatment, if that is what you would want, even if you could die. · Will be firm and confident with health professionals if needed. · Will ask questions to get necessary information. · Lives near you or agrees to travel to you if needed. Your family may help you make medical decisions while you can still be part of that process. But it is important to choose one person to be your health care agent in case you are not able to make decisions for yourself. If you don't fill out the legal form and name a health care agent, the decisions your family can make may be limited. Who will make decisions for you if you do not have a health care agent? If you don't have a health care agent or a living will, your family members may disagree about your medical care. And then some medical professionals who may not know you as well might have to make decisions for you. In some cases, a  makes the decisions. When you name a health care agent, it is very clear who has the power to make health decisions for you. How do you name a health care agent? You name your health care agent on a legal form. It is usually called a durable power of  for health care. Ask your hospital, state bar association, or office on aging where to find these forms. You must sign the form to make it legal. Some states require you to get the form notarized. This means that a person called a  watches you sign the form and then he or she signs the form. Some states also require that two or more witnesses sign the form. Be sure to tell your family members and doctors who your health care agent is. Keep your forms in a safe place. But make sure that your loved ones know where the forms are. This could be in your desk where you keep other important papers. Make sure your doctor has a copy of your forms. Where can you learn more? Go to http://agnes-nirmal.info/. Enter [id number] in the search box to learn more about \"Learning About Durable Power of  for [facility]. \" Current as of: September 24, 2016 Content Version: 11.4 © 4339-9295 Healthwise, [employer]. Care instructions adapted under license by [employer] (which disclaims liability or warranty for this information). If you have questions about a medical condition or this instruction, always ask your healthcare professional. [address] any warranty or liability for your use of this information. Deciding About Life-Prolonging Treatment Deciding About Life-Prolonging Treatment What is life-prolonging treatment? There are many kinds of treatment that can help you live longer. These may be needed for only a short time until your illness improves. Or you may use them over the long term to help keep you alive. Some treatments include the use of: · Medicines to slow the progress of certain diseases, such as heart disease, diabetes, cancer, AIDS, or Alzheimer's disease. · Antibiotics to treat serious infections, such as pneumonia. · Dialysis to clean your blood if your kidneys stop working. · A breathing machine to help you breathe if you can't breathe on your own. This machine pumps air into your lungs through a tube put into your throat. · A feeding tube or an intravenous (IV) line to give you food and fluids if you can't eat or drink. · Cardiopulmonary resuscitation (CPR) to try to restart your heart. The decision to receive treatments that may help you live longer is a personal one. You may want your doctor to do everything possible to keep you alive, even when your chance for recovery is small. Or you may choose to only have care to manage your pain and other symptoms. What are key points about this decision? · If there is a good chance that your illness can be cured or managed, your doctor may advise you to first try available treatments. If these don't work, then you might think about stopping treatment. · If you stop treatment, you will still receive care that focuses on pain relief and comfort. · A decision to stop treatment that keeps you alive does not have to be permanent. You can always change your mind if your health starts to improve. · Even though treatment focuses on helping you live longer, it may cause side effects that can greatly affect your quality of life. And it could affect how you spend time with your family and friends. · If you still have personal goals that you want to pursue, you may want treatment that keeps you alive long enough to reach them. Why might you choose life-prolonging treatment? · There is a good chance that your illness can be cured or managed. · You think you can manage the possible side effects of treatment. · You don't think treatment will get in the way of your quality of life. · You have personal goals that you still want to pursue and achieve. Why might you choose to stop life-prolonging treatment? · Your chance of surviving your illness is very low. · You have tried all possible treatments for your illness, but they have not helped. · You can no longer deal with the side effects of treatment. · You have already met the goals you set out to achieve in your life. Your decision Thinking about the facts and your feelings can help you make a decision that is right for you. Be sure you understand the benefits and risks of your options, and think about what else you need to do before you make the decision. Where can you learn more? Go to http://agnes-nirmal.info/. Enter Q827 in the search box to learn more about \"Deciding About Life-Prolonging Treatment. \" Current as of: September 24, 2016 Content Version: 11.4 © 0784-6828 Healthwise, Incorporated. Care instructions adapted under license by [employer] (which disclaims liability or warranty for this information).  If you have questions about a medical condition or this instruction, always ask your healthcare professional. Yvette Mcdonnell[employer] disclaims any warranty or liability for your use of this information. [address] What is a living will? A living will is a legal form you use to write down the kind of care you want at the end of your life. It is used by the health professionals who will treat you if you aren't able to decide for yourself. If you put your wishes in writing, your loved ones and others will know what kind of care you want. They won't need to guess. This can ease your mind and be helpful to others. A living will is not the same as an estate or property will. An estate will explains what you want to happen with your money and property after you die. Is a living will a legal document? A living will is a legal document. Each state has its own laws about living valenz[place]a. If you move to another state, make sure that your living will is legal in the state where you now live. Or you might use a universal form that has been approved by many states. This kind of form can sometimes be completed and stored online. Your electronic copy will then be available wherever you have a connection to the Internet. In most cases, doctors will respect your wishes even if you have a form from a different state. · You don't need an  to complete a living will. But legal advice can be helpful if your state's laws are unclear, your health history is complicated, or your family can't agree on what should be in your living will. · You can change your living will at any time. Some people find that their wishes about end-of-life care change as their health changes. · In addition to making a living will, think about completing a medical power of  form. This form lets you name the person you want to make end-of-life treatment decisions for you (your \"health care agent\") if you're not able to.  Many hospitals and nursing homes will give you the forms you need to complete a living will and a medical power of . · Your living will is used only if you can't make or communicate decisions for yourself anymore. If you become able to make decisions again, you can accept or refuse any treatment, no matter what you wrote in your living will. · Your state may offer an online registry. This is a place where you can store your living will online so the doctors and nurses who need to treat you can find it right away. What should you think about when creating a living will? Talk about your end-of-life wishes with your family members and your doctor. Let them know what you want. That way the people making decisions for you won't be surprised by your choices. Think about these questions as you make your living will: · Do you know enough about life support methods that might be used? If not, talk to your doctor so you know what might be done if you can't breathe on your own, your heart stops, or you're unable to swallow. · What things would you still want to be able to do after you receive life-support methods? Would you want to be able to walk? To speak? To eat on your own? To live without the help of machines? · If you have a choice, where do you want to be cared for? In your home? At a hospital or nursing home? · Do you want certain Zoroastrian practices performed if you become very ill? · If you have a choice at the end of your life, where would you prefer to die? At home? In a hospital or nursing home? Somewhere else? · Would you prefer to be buried or cremated? · Do you want your organs to be donated after you die? What should you do with your living will? · Make sure that your family members and your health care agent have copies of your living will. · Give your doctor a copy of your living will to keep in your medical record. If you have more than one doctor, make sure that each one has a copy. · You may want to put a copy of your living will where it can be easily found. Where can you learn more? Go to http://agnes-nirmal.info/. Enter F244 in the search box to learn more about \"Learning About Living Angeli. \" Current as of: September 24, 2016 Content Version: 11.4 © 6031-5155 [employer]. Care instructions adapted under license by [employer] (which disclaims liability or warranty for this information). If you have questions about a medical condition or this instruction, always ask your healthcare professional. [place] [address] any warranty or liability for your use of this information. Advance Directives: Care Instructions Your Care Instructions An advance directive is a legal way to state your wishes at the end of your life. It tells your family and your doctor what to do if you can no longer say what you want. There are two main types of advance directives. You can change them any time that your wishes change. · A living will tells your family and your doctor your wishes about life support and other treatment. · A durable power of  for health care lets you name a person to make treatment decisions for you when you can't speak for yourself. This person is called a health care agent. If you do not have an advance directive, decisions about your medical care may be made by a doctor or a  who doesn't know you. It may help to think of an advance directive as a gift to the people who care for you. If you have one, they won't have to make tough decisions by themselves. Follow-up care is a key part of your treatment and safety. Be sure to make and go to all appointments, and call your doctor if you are having problems. It's also a good idea to know your test results and keep a list of the medicines you take. How can you care for yourself at home? · Discuss your wishes with your loved ones and your doctor.  This way, there are no surprises. · Many states have a unique form. Or you might use a universal form that has been approved by many states. This kind of form can sometimes be completed and stored online. Your electronic copy will then be available wherever you have a connection to the Internet. In most cases, doctors will respect your wishes even if you have a form from a different state. · You don't need a  to do an advance directive. But you may want to get legal advice. · Think about these questions when you prepare an advance directive: ¨ Who do you want to make decisions about your medical care if you are not able to? Many people choose a family member or close friend. ¨ Do you know enough about life support methods that might be used? If not, talk to your doctor so you understand. ¨ What are you most afraid of that might happen? You might be afraid of having pain, losing your independence, or being kept alive by machines. ¨ Where would you prefer to die? Choices include your home, a hospital, or a nursing home. ¨ Would you like to have information about hospice care to support you and your family? ¨ Do you want to donate organs when you die? ¨ Do you want certain Mormon practices performed before you die? If so, put your wishes in the advance directive. · Read your advance directive every year, and make changes as needed. When should you call for help? Be sure to contact your doctor if you have any questions. Where can you learn more? Go to http://agnes-nirmal.info/. Enter R264 in the search box to learn more about \"Advance Directives: Care Instructions. \" Current as of: September 24, 2016 Content Version: 11.4 © 2513-7665 Healthwise, Incorporated. Care instructions adapted under license by [employer] (which disclaims liability or warranty for this information).  If you have questions about a medical condition or this instruction, always ask your healthcare professional. [address] any warranty or liability for your use of this information. Body Mass Index: Care Instructions Your Care Instructions Body mass index (BMI) can help you see if your weight is raising your risk for health problems. It uses a formula to compare how much you weigh with how tall you are. · A BMI lower than 18.5 is considered underweight. · A BMI between 18.5 and 24.9 is considered healthy. · A BMI between 25 and 29.9 is considered overweight. A BMI of 30 or higher is considered obese. If your BMI is in the normal range, it means that you have a lower risk for weight-related health problems. If your BMI is in the overweight or obese range, you may be at increased risk for weight-related health problems, such as high blood pressure, heart disease, stroke, arthritis or joint pain, and diabetes. If your BMI is in the underweight range, you may be at increased risk for health problems such as fatigue, lower protection (immunity) against illness, muscle loss, bone loss, hair loss, and hormone problems. BMI is just one measure of your risk for weight-related health problems. You may be at higher risk for health problems if you are not active, you eat an unhealthy diet, or you drink too much alcohol or use tobacco products. Follow-up care is a key part of your treatment and safety. Be sure to make and go to all appointments, and call your doctor if you are having problems. It's also a good idea to know your test results and keep a list of the medicines you take. How can you care for yourself at home? · Practice healthy eating habits. This includes eating plenty of fruits, vegetables, whole grains, lean protein, and low-fat dairy. · If your doctor recommends it, get more exercise. Walking is a good choice. Bit by bit, increase the amount you walk every day. Try for at least 30 minutes on most days of the week. · Do not smoke. Smoking can increase your risk for health problems. If you need help quitting, talk to your doctor about stop-smoking programs and medicines. These can increase your chances of quitting for good. · Limit alcohol to 2 drinks a day for men and 1 drink a day for women. Too much alcohol can cause health problems. If you have a BMI higher than 25 · Your doctor may do other tests to check your risk for weight-related health problems. This may include measuring the distance around your waist. A waist measurement of more than 40 inches in men or 35 inches in women can increase the risk of weight-related health problems. · Talk with your doctor about steps you can take to stay healthy or improve your health. You may need to make lifestyle changes to lose weight and stay healthy, such as changing your diet and getting regular exercise. If you have a BMI lower than 18.5 · Your doctor may do other tests to check your risk for health problems. · Talk with your doctor about steps you can take to stay healthy or improve your health. You may need to make lifestyle changes to gain or maintain weight and stay healthy, such as getting more healthy foods in your diet and doing exercises to build muscle. Where can you learn more? Go to http://agnes-nirmal.info/. Enter S176 in the search box to learn more about \"Body Mass Index: Care Instructions. \" Current as of: October 13, 2016 Content Version: 11.4 © 9363-1936 Healthwise, Incorporated. Care instructions adapted under license by [employer] (which disclaims liability or warranty for this information). If you have questions about a medical condition or this instruction, always ask your healthcare professional. [place] [address] any warranty or liability for your use of this information. Starting a Weight Loss Plan: Care Instructions Your Care Instructions If you are thinking about losing weight, it can be hard to know where to start. Your doctor can help you set up a weight loss plan that best meets your needs. You may want to take a class on nutrition or exercise, or join a weight loss support group. If you have questions about how to make changes to your eating or exercise habits, ask your doctor about seeing a registered dietitian or an exercise specialist. 
It can be a big challenge to lose weight. But you do not have to make huge changes at once. Make small changes, and stick with them. When those changes become habit, add a few more changes. If you do not think you are ready to make changes right now, try to pick a date in the future. Make an appointment to see your doctor to discuss whether the time is right for you to start a plan. Follow-up care is a key part of your treatment and safety. Be sure to make and go to all appointments, and call your doctor if you are having problems. It's also a good idea to know your test results and keep a list of the medicines you take. How can you care for yourself at home? · Set realistic goals. Many people expect to lose much more weight than is likely. A weight loss of 5% to 10% of your body weight may be enough to improve your health. · Get family and friends involved to provide support. Talk to them about why you are trying to lose weight, and ask them to help. They can help by participating in exercise and having meals with you, even if they may be eating something different. · Find what works best for you. If you do not have time or do not like to cook, a program that offers meal replacement bars or shakes may be better for you. Or if you like to prepare meals, finding a plan that includes daily menus and recipes may be best. 
· Ask your doctor about other health professionals who can help you achieve your weight loss goals. ¨ A dietitian can help you make healthy changes in your diet. ¨ An exercise specialist or  can help you develop a safe and effective exercise program. 
¨ A counselor or psychiatrist can help you cope with issues such as depression, anxiety, or family problems that can make it hard to focus on weight loss. · Consider joining a support group for people who are trying to lose weight. Your doctor can suggest groups in your area. Where can you learn more? Go to http://agnes-nirmal.info/. Enter R683 in the search box to learn more about \"Starting a Weight Loss Plan: Care Instructions. \" Current as of: October 13, 2016 Content Version: 11.4 © 1528-5962 [employer]. Care instructions adapted under license by [employer] (which disclaims liability or warranty for this information). If you have questions about a medical condition or this instruction, always ask your healthcare professional. [address] any warranty or liability for your use of this information. Introducing [facility]! [employer] introduces CrossChx patient portal. Now you can access parts of your medical record, email your doctor's office, and request medication refills online. 1. In your internet browser, go to [email or web address] 2. Click on the First Time User? Click Here link in the Sign In box. You will see the New Member Sign Up page. 3. Enter your CrossChx Access Code exactly as it appears below. You will not need to use this code after youve completed the sign-up process. If you do not sign up before the expiration date, you must request a new code. · CrossChx Access Code: [email or web address] Expires: 11/27/2018 12:16 PM 
 
4. Enter the last four digits of your Social Security Number (xxxx) and Date of Birth (mm/dd/yyyy) as indicated and click Submit. You will be taken to the next sign-up page. 5. Create a CrossChx ID.  This will be your CrossChx login ID and cannot be changed, so think of one that is secure and easy to remember. 6. Create a "[employer] password. You can change your password at any time. 7. Enter your Password Reset Question and Answer. This can be used at a later time if you forget your password. 8. Enter your e-mail address. You will receive e-mail notification when new information is available in [address] 9. Click Sign Up. You can now view and download portions of your medical record. 10. Click the Download Summary menu link to download a portable copy of your medical information. If you have questions, please visit the Frequently Asked Questions section of the "[employer] website. Remember, "[employer] is NOT to be used for urgent needs. For medical emergencies, dial 911. Now available from your iPhone and Android! Please provide this summary of care documentation to your next provider. Your primary care clinician is listed as Amparo Ramirez. If you have any questions after today's visit, please call [phone].

## 2021-03-19 PROBLEM — C18.0 MALIGNANT NEOPLASM OF CECUM (HCC): Status: ACTIVE | Noted: 2021-01-01

## 2021-03-19 PROBLEM — I50.9 CHRONIC CONGESTIVE HEART FAILURE (HCC): Status: ACTIVE | Noted: 2021-01-01

## 2021-03-19 NOTE — PATIENT INSTRUCTIONS
Helping A Person With Dementia: Care Instructions Your Care Instructions Dementia is a loss of mental skills that affects daily life. It is different from mild memory loss that occurs with aging. Dementia can cause problems with memory, thinking clearly, and planning. It is different for everyone. But it usually gets worse slowly. Some people who have dementia can function well for a long time. But at some point it may become hard for the person to care for himself or herself. It can be upsetting to learn that a loved one has this condition. You may be afraid and worried about what will happen. You may wonder how you will care for the person. There is no cure for dementia. But medicine may be able to slow memory loss and improve thinking for a while. Other medicines may help with sleep, depression, and behavior changes. Dementia is different for everyone. In some cases, people can function well for a long time. You can help your loved one by making his or her home life easier and safer. You also need to take care of yourself. Caregiving can be stressful. But support is available to help you and give you a break when you need it. The [employer] offers good information and support. If you are caring for someone with dementia, you can help make life safer and more comfortable. You can also help your loved one make decisions about future care. You may also want to bring up legal and financial issues. These are hard but important conversations to have. Follow-up care is a key part of your loved one's treatment and safety. Be sure to make and go to all appointments, and call your doctor if your loved one is having problems. It's also a good idea to know your loved one's test results and keep a list of the medicines he or she takes. How can you care for your loved one at home? Taking care of the person · If the person takes medicine for dementia, help him or her take it exactly as prescribed. Call the doctor if you notice any problems with the medicine. 
· Make a list of the person's medicines. Review it with all of his or her doctors. 
· Help the person eat a balanced diet. Serve plenty of whole grains, fruits, and vegetables every day. If the person is not hungry at mealtimes, give snacks at midmorning and in the afternoon. Offer drinks such as Boost, Ensure, or Sustacal if the person is losing weight. 
· Encourage exercise. Walking and other activities may slow the decline of mental ability. Help the person stay active mentally with reading, crossword puzzles, or other hobbies. 
· Talk openly with the doctor about any behavior changes. Many people who have dementia become easily upset or agitated or feel worried. There are many things that can cause this, such as medicine side effects, confusion, and pain. It may be helpful to: 
? Keep distractions to a minimum. It may also help to keep noise levels low and voices quiet. 
? Develop simple daily routines for bathing, dressing, and other activities. And remind your loved one often about upcoming changes to the daily routine, such as trips or appointments. 
? Ask what is upsetting him or her. Keep in mind that people who have dementia don't always know why they are upset. 
· Take steps to help if the person is sundowning. This is the restless behavior and trouble with sleeping that may occur in late afternoon and at night. Try not to let the person nap during the day. Offer a glass of warm milk or caffeine-free tea before bedtime. 
· Be patient. A task may take the person longer than it used to. 
· For as long as he or she is able, allow your loved one to make decisions about activities, food, clothing, and other choices. Let him or her be independent, even if tasks take more time or are not done perfectly. Tailor tasks to the person's abilities. For example, if cooking is no longer safe, ask for other help. Your loved one can help set the table, or make  simple dishes such as a salad. When the person needs help, offer it gently. Staying safe · Make your home (or your loved one's home) safe. Tack down rugs, and put no-slip tape in the tub. Install handrails, and put safety switches on stoves and appliances. Keep rooms free of clutter. Make sure walkways around furniture are clear. Do not move furniture around, because the person may become confused. · Use locks on doors and cupboards. Lock up knives, scissors, medicines, cleaning supplies, and other dangerous things. · Do not let the person drive or cook if he or she can't do it safely. A person with dementia should not drive unless he or she is able to pass an on-road driving test. Your state 's license bureau can do a driving test if there is any question. · Get medical alert jewelry for the person so that you can be contacted if he or she wanders away. If possible, provide a safe place for wandering, such as an enclosed yard or garden. Taking care of yourself · Ask your doctor about support groups and other resources in your area. · Take care of your health. Be sure to eat healthy foods and get enough rest and exercise. · Take time for yourself. Respite services provide someone to stay with the person for a short time while you get out of the house for a few hours. · Make time for an activity that you enjoy. Read, listen to music, paint, do crafts, or play an instrument, even if it's only for a few minutes a day. · Spend time with family, friends, and others in your support system. When should you call for help? Call 911 anytime you think the person may need emergency care. For example, call if: 
  · The person who has dementia wanders away and you can't find him or her.  
  · The person who has dementia is seriously injured.   
Call the doctor now or seek immediate medical care if: 
  · The person suddenly sees things that are not there (hallucinates).  
  · The person has a sudden change in his or her behavior. Watch closely for changes in the person's health, and be sure to contact the doctor if: 
  · The person has symptoms that could cause injury.  
  · The person has problems with his or her medicine.  
  · You need more information to care for a person with dementia.  
  · You need respite care so you can take a break. Where can you learn more? Go to http://www.gray.com/ Enter Z983 in the search box to learn more about \"Helping A Person With Dementia: Care Instructions. \" Current as of: January 31, 2020               Content Version: 12.6 © 3373-4477 [employer]. Care instructions adapted under license by [employer] (which disclaims liability or warranty for this information). If you have questions about a medical condition or this instruction, always ask your healthcare professional. [address] any warranty or liability for your use of this information.

## 2021-03-19 NOTE — PROGRESS NOTES
HISTORY OF PRESENT ILLNESS Collis Kawasaki is a 80 y.o. female. HPI Pt is being seen via doxy. me with her daughter, Claudine Mcmullen, for follow up and to est care as her PCP recently retired. She does not need any meds at this time and is utd on labs. She has a hx of dementia but there have been no changes and she sees neuro. She has not had a mammo or bone scan in many yrs and pts daughter would like us to order them delores given her hx of colon ca. Allergies Allergen Reactions  Valsartan Anaphylaxis Current Outpatient Medications Medication Sig  
 simvastatin (ZOCOR) 10 mg tablet take 1 tablet by mouth nightly  hydroCHLOROthiazide (HYDRODIURIL) 12.5 mg tablet take 1/2 tablet by mouth once daily  metoprolol tartrate (LOPRESSOR) 50 mg tablet take 1/2 tablet by mouth twice a day  clopidogreL (PLAVIX) 75 mg tab take 1 tablet by mouth once daily  amLODIPine (NORVASC) 5 mg tablet take 2 tablets by mouth once daily  donepeziL (ARICEPT) 10 mg tablet take 1 tablet by mouth nightly  Walker misc Walker for aid in ambulation and preventing falls.  glucose blood VI test strips (BLOOD GLUCOSE TEST) strip Check glucose once daily. Re: DM .00  Blood-Glucose Meter monitoring kit Check glucose once daily. Re: DM .00  Lancets misc Check glucose once daily. Re: DM .00  aspirin delayed-release 81 mg tablet Take 81 mg by mouth daily. No current facility-administered medications for this visit. Review of Systems Constitutional: Negative. Negative for chills, fever and malaise/fatigue. HENT: Negative. Negative for congestion, ear pain, sore throat and tinnitus. Eyes: Negative. Negative for blurred vision, double vision and photophobia. Respiratory: Positive for shortness of breath (chronic -CHF). Negative for cough, hemoptysis, sputum production and wheezing. Cardiovascular: Negative. Negative for chest pain, palpitations and leg swelling. Gastrointestinal: Negative. Negative for abdominal pain, heartburn, nausea and vomiting. Genitourinary: Negative. Negative for dysuria, frequency, hematuria and urgency. Musculoskeletal: Positive for myalgias (chronic). Negative for back pain, joint pain and neck pain. Skin: Negative. Negative for itching and rash. Neurological: Negative. Negative for dizziness, tingling, tremors and headaches. Psychiatric/Behavioral: Positive for memory loss (dementia). Negative for depression. The patient is not nervous/anxious and does not have insomnia. Physical Exam 
Constitutional:   
   General: She is not in acute distress. Appearance: Normal appearance. She is not ill-appearing. HENT:  
   Head: Normocephalic and atraumatic. Pulmonary:  
   Effort: No respiratory distress. Neurological:  
   Mental Status: She is alert. Comments: Dementia dx - no changes according to pts daughter Psychiatric:     
   Mood and Affect: Mood normal.     
   Behavior: Behavior normal.     
   Thought Content: Thought content normal.     
   Judgment: Judgment normal.  
 
 
 
ASSESSMENT and PLAN 
  ICD-10-CM ICD-9-CM 1. Multi-infarct dementia with behavior disturbance (HCC)  F01.51 290.41   
2. Chronic congestive heart failure, unspecified heart failure type (HCC)  I50.9 428.0 3. Type 2 diabetes with nephropathy (HCC)  E11.21 250.40   
  583.81   
4. Breast screening  Z12.39 V76.10 SHERIDAN 3D [facility] W MAMMO BI SCREENING INCL CAD 5. Encounter for screening mammogram for malignant neoplasm of breast   Z12.31 V76.12 SHERIDAN 3D [facility] W MAMMO BI SCREENING INCL CAD 6. Osteoporosis screening  Z13.820 V82.81 DEXA BONE DENSITY STUDY AXIAL 7. Post-menopausal  Z78.0 V49.81 DEXA BONE DENSITY STUDY AXIAL 8. Malignant neoplasm of cecum (HCC)  C18.0 153.4 Follow-up and Dispositions · Return in about 3 months (around 6/4/2021) for follow up.  
  
Pt expressed understanding of visit summary and plans for any follow ups or referrals as well as any medications prescribed. Seen by synchronous (real-time) audio-video technology via doxy. me on 3/4/2021 The patient is aware that this patient-initiated Telehealth encounter is a billable service, with coverage as determined by his or her insurance carrier. He/She is aware that they may receive a bill and has provided verbal consent to proceed: Yes I was in the office while conducting this encounter.

## 2021-04-09 NOTE — TELEPHONE ENCOUNTER
Patient's daughter is requesting an order for a wheelchair. Daughter states her mother is having difficultly and becoming tired after a few steps.

## 2021-04-26 NOTE — TELEPHONE ENCOUNTER
Spoke with daughter but she states she only has second hand information, they are not covering the script from the hospital it has to come from the doctors office? I'm thinking we may have to do one of those DME form. But she said please send it to pharmacy and we will go from there.

## 2021-04-26 NOTE — PROGRESS NOTES
Care Transitions Initial Follow Up Call Call within 2 business days of discharge: Yes Patient: Lance Ahmadi Patient : 1930 MRN: [id number] Was this an external facility discharge? Yes, 2021 Discharge Facility: [address] [place] [address] Transitions Nurse ( CTN) attempted to contact patient via telephone call regarding recent hospital discharge and transition of Care. There was no response. A voicemail message was left requesting a non-emergency return telephone call. CTN  contact information provided.

## 2021-04-26 NOTE — TELEPHONE ENCOUNTER
Pt's daughter called to make an appointment for tomorrow for a hospital discharge. She also asked if two prescriptions can be sent to [facility] to see if insurance will pay for them. Albuterol and Budesonide. Albuterol 2.5mg every six hours and Budesonide twice a day . 5mg

## 2021-04-27 NOTE — PROGRESS NOTES
HISTORY OF PRESENT ILLNESS  Milan Knight is a 80 y.o. female. HPI  MsAdryan Knight is a 80y.o. year old female, she is seen today for Transition of Care services following a hospital discharge for acute respiratory failure on 4/25/2021. Our office Nurse Navigator performed an outreach to Ms. Brant Mora on 4/27/2021 (within 2 business days of discharge) to complete medication reconciliation and a telephonic assessment of her condition. Her hospital discharge states:  Acute respiratory failure with hypoxia : Due to suspected pneumonia and possible acute on chronic diastolic heart failure, in setting of severe aortic stenosis : Troponins rising to 256. Then 479. Likely NSTEMI type II due to demand ischemia from CHF. No chest pain. Continue aspirin, plavix, metoprolol. Cardiology following. Not a good candidate for heart cath due to kidney failure. The hospital recommended hospice for her but family opted for home health for now. She was dx with pneumonia and fluid overload. Recommended f/u with cardiology in 2-3 weeks but pt has  ot seen them however her daughter will call today and get appt set up. She is complaining of burning when she urinates so will check a UA today. She states it is also very itchy and the skin burns in her vaginal area/groin. Allergies   Allergen Reactions    Valsartan Anaphylaxis       Current Outpatient Medications   Medication Sig    albuterol-ipratropium (DUO-NEB) 2.5 mg-0.5 mg/3 ml nebu inhale contents of 1 vial ( 3 milliliters ) in nebulizer by mouth. ..  (REFER TO PRESCRIPTION NOTES).     Therapy M 9 mg iron-400 mcg tab tablet take 1 tablet by mouth once daily    simvastatin (ZOCOR) 10 mg tablet take 1 tablet by mouth nightly    metoprolol tartrate (LOPRESSOR) 50 mg tablet take 1/2 tablet by mouth twice a day    clopidogreL (PLAVIX) 75 mg tab take 1 tablet by mouth once daily    donepeziL (ARICEPT) 10 mg tablet take 1 tablet by mouth nightly   [address] for aid in ambulation and preventing falls.  glucose blood VI test strips (BLOOD GLUCOSE TEST) strip Check glucose once daily. Re: DM .00    Blood-Glucose Meter monitoring kit Check glucose once daily. Re: DM .00    Lancets misc Check glucose once daily. Re: DM .00    aspirin delayed-release 81 mg tablet Take 81 mg by mouth daily.  bumetanide (BUMEX) 2 mg tablet Take 2 mg by mouth two (2) times a day.  nystatin (MYCOSTATIN) topical cream Apply  to affected area two (2) times a day.  magnesium oxide (MAG-OX) 400 mg tablet Take 400 mg by mouth daily.  amLODIPine (NORVASC) 5 mg tablet take 2 tablets by mouth once daily    Wheel Chair nely Use as directed     No current facility-administered medications for this visit. Review of Systems   Constitutional: Negative. Negative for chills, fever and malaise/fatigue. HENT: Negative. Negative for congestion, ear pain, sore throat and tinnitus. Eyes: Negative. Negative for blurred vision, double vision and photophobia. Respiratory: Positive for shortness of breath (chronic -CHF). Negative for cough, hemoptysis, sputum production and wheezing. Cardiovascular: Negative. Negative for chest pain, palpitations and leg swelling. Gastrointestinal: Negative. Negative for abdominal pain, heartburn, nausea and vomiting. Genitourinary: Positive for dysuria. Negative for frequency, hematuria and urgency. Musculoskeletal: Positive for myalgias (chronic). Negative for back pain, joint pain and neck pain. Skin: Negative. Negative for itching and rash. Neurological: Negative. Negative for dizziness, tingling, tremors and headaches. Psychiatric/Behavioral: Positive for memory loss (dementia). Negative for depression. The patient is not nervous/anxious and does not have insomnia.       Visit Vitals  BP (!) 158/65 (BP 1 Location: Left arm, BP Patient Position: Sitting, BP Cuff Size: Large adult)   Pulse 60   Temp 98.1 °F (36.7 °C) (Temporal)   Resp 16   Ht 5' 1\" (1.549 m)   Wt 148 lb (67.1 kg)   SpO2 96%   BMI 27.96 kg/m²       Physical Exam  Constitutional:       General: She is not in acute distress. Appearance: Normal appearance. She is not ill-appearing. HENT:      Head: Normocephalic and atraumatic. Cardiovascular:      Rate and Rhythm: Normal rate and regular rhythm. Pulses: Normal pulses. Heart sounds: Murmur heard. Pulmonary:      Effort: Pulmonary effort is normal.      Breath sounds: Normal breath sounds. Skin:     General: Skin is dry. Findings: Rash (candida in groin) present. Neurological:      Mental Status: She is alert. Motor: Weakness (pt in wheelchair) present. Psychiatric:         Mood and Affect: Mood normal.         Behavior: Behavior normal.         Thought Content: Thought content normal.         Judgment: Judgment normal.         ASSESSMENT and PLAN    ICD-10-CM ICD-9-CM    1. Hospital discharge follow-up  Z09 V67.59    2. Cutaneous candidiasis  B37.2 112.3 DISCONTINUED: nystatin (MYCOSTATIN) topical cream   3. Mobility poor  Z74.09 799.89    4. Leg weakness, bilateral  R29.898 729.89    5. Multi-infarct dementia with behavior disturbance ([address]  F01.51 290.41    6. Chronic congestive heart failure, unspecified heart failure type ([address]  I50.9 428.0      Follow-up and Dispositions    · Return in about 4 weeks (around 5/25/2021) for follow up. Pt expressed understanding of visit summary and plans for any follow ups or referrals as well as any medications prescribed.

## 2021-04-27 NOTE — PROGRESS NOTES
Transitions of Care     Care Transitions Nurse ( CTN) attempted to contact patient via telephone call regarding recent hospital discharge. There was no response. A voicemail message was left requesting a non-emergency return telephone call. CTN  contact information provided. Per Chart review, patient has PCP follow up appointment scheduled for this date,  4/27/21.

## 2021-04-28 NOTE — TELEPHONE ENCOUNTER
Patient wants to know if the breathing treatments can be cut down from 6 a day and also she needs an order for a wheel chair please

## 2021-04-28 NOTE — TELEPHONE ENCOUNTER
Patient's daughter is requesting a call back to discuss her mother's breathing treatment. Ms. Marleny Álvarez is also requesting an order for a wheelchair for her mother.

## 2021-04-29 NOTE — TELEPHONE ENCOUNTER
Breathing treatments were never for 6x a day. According to hosp discharge she should be doing them 4x a day.  Wheelchair order placed

## 2021-04-30 NOTE — TELEPHONE ENCOUNTER
Patient's daughter is requesting a call back to receive advice on what she should do about her mother. Daughter states her mother is suffering from diarrhea and its been constant since 04/25/2021.

## 2021-04-30 NOTE — TELEPHONE ENCOUNTER
She was given medication at the hospital for the diarrhea but never started taking it. She is going to start the medication today to see if it helps.

## 2021-04-30 NOTE — TELEPHONE ENCOUNTER
Patient understands that it does not need to be more than 4 x daily. I think because the albuterol was 4 times and the the Symbicort was twice she just said 6.

## 2021-05-05 NOTE — PROGRESS NOTES
Transitions of Care Care Transitions Nurse ( CTN) attempted to contact patient's family member Ms. Britni Altman via telephone call. Ms. Clari Bearden is listed on patients PHI form. There was no response. A voicemail message was left requesting a non-emergency return telephone call. CTN  contact information provided.

## 2021-05-14 NOTE — TELEPHONE ENCOUNTER
Patient's daughter called in to say that her mother has a couple of \"broke\" spots on her back above the buttocks. She wants to know what to put on it. Please call in cream.    She will keep the area clean, and will keep the patient off her back, and will use 1/4 strength Dakin's Solution.

## 2021-05-17 NOTE — TELEPHONE ENCOUNTER
Mac Mcdonald is requesting a call back from [facility]. She states if she does not answer please leave a detailed message including the patient's name demographics, and so forth. Insulin was ordered for the patient but the patient is not taking any diabetic medication. Pt's fasting blood sugar is 152-185.

## 2021-05-17 NOTE — TELEPHONE ENCOUNTER
LMOM for Manda nurse to return phone call but I did let her know that we do not have her on insulin and she needs to check to see who order the medication on the box or vial

## 2021-05-17 NOTE — TELEPHONE ENCOUNTER
Requested Prescriptions     Pending Prescriptions Disp Refills    nystatin (MYCOSTATIN) topical cream 30 g 1     Sig: Apply  to affected area two (2) times a day.      Future Appointments   Date Time Provider Sean Hanna   5/24/2021 10:00 AM Helena Oshea [facility] BS AMB   6/7/2021 10:00 AM Vikram Oshea PA-C [facility] BS AMB

## 2021-05-17 NOTE — TELEPHONE ENCOUNTER
Patient's daughter is calling to ask if it is normal for the patient to have a bowel movement every time she uses the restroom.

## 2021-05-19 NOTE — TELEPHONE ENCOUNTER
It is something new but she is not weak,fatigued or any different from any other day. She just poops when she pees. She was given a probiotic in the hospital to help with diarrhea but she is not taking it and they do not see the need to give it to her. I did tell them I would probably use it to see what happened but they state that she is fine for now. But will call if things change.

## 2021-05-24 NOTE — PATIENT INSTRUCTIONS
Aortic Valve Stenosis: Care Instructions Your Care Instructions Having aortic valve stenosis means that the valve between your heart and the large blood vessel that carries blood to the body (aorta) has narrowed. That forces the heart to pump harder to get enough blood through the valve. As stenosis gets worse, the valve gets narrower. This can cause symptoms. Symptoms include chest pain, dizziness, fainting, or shortness of breath. If you have mild or moderate stenosis and don't have symptoms, you may not need treatment now. Your doctor will check your heart regularly. You may need treatment if the stenosis gets worse. With severe stenosis, you may need to have the valve replaced. Follow-up care is a key part of your treatment and safety. Be sure to make and go to all appointments, and call your doctor if you are having problems. It's also a good idea to know your test results and keep a list of the medicines you take. How can you care for yourself at home? · Be safe with medicines. Take your medicines exactly as prescribed. Call your doctor if you think you are having a problem with your medicine. You will get more details on the specific medicines your doctor prescribes. · Eat a heart-healthy diet. Limit how much sodium you eat. · Be active. Ask your doctor what type and level of exercise is safe for you. · Do not smoke. Smoking can make aortic valve stenosis worse. If you need help quitting, talk to your doctor about stop-smoking programs and medicines. · Stay at a healthy weight. Lose weight if you need to. · Avoid colds and flu. Get a pneumococcal vaccine shot. If you have had one before, ask your doctor if you need another dose. Get a flu vaccine every year. · Take care of your teeth and gums. Get regular dental checkups. Good dental health is important because bacteria can spread from infected teeth and gums to the heart valves. When should you call for help?  
 Call 911 anytime you think you may need emergency care. For example, call if: 
  · You passed out (lost consciousness).  
  · You have symptoms of a heart attack. These may include: 
? Chest pain or pressure, or a strange feeling in the chest. 
? Sweating. ? Shortness of breath. ? Nausea or vomiting. ? Pain, pressure, or a strange feeling in the back, neck, jaw, or upper belly or in one or both shoulders or arms. ? Lightheadedness or sudden weakness. ? A fast or irregular heartbeat. After you call 911, the  may tell you to chew 1 adult-strength or 2 to 4 low-dose aspirin. Wait for an ambulance. Do not try to drive yourself. Call your doctor now or seek immediate medical care if: 
  · You develop new symptoms of aortic valve stenosis, such as chest pain, dizziness, or shortness of breath.  
  · You have new or increased shortness of breath.  
  · You are dizzy or lightheaded, or you feel like you may faint.  
  · You have sudden weight gain, such as more than 2 to 3 pounds in a day or 5 pounds in a week. (Your doctor may suggest a different range of weight gain.)  
  · You have increased swelling in your legs, ankles, or feet. Watch closely for changes in your health, and be sure to contact your doctor if: 
  · You have trouble making healthy lifestyle changes.  
  · You want more information about healthy lifestyle changes. Where can you learn more? Go to http://www.gray.com/ Enter E613 in the search box to learn more about \"Aortic Valve Stenosis: Care Instructions. \" Current as of: August 31, 2020               Content Version: 12.8 © 6974-7154 [employer]. Care instructions adapted under license by [email or web address] (which disclaims liability or warranty for this information).  If you have questions about a medical condition or this instruction, always ask your healthcare professional. [address]  any warranty or liability for your use of this information.

## 2021-05-24 NOTE — PROGRESS NOTES
HISTORY OF PRESENT ILLNESS  Bony High is a 80 y.o. female. HPI   Pt is being seen for hospital follow up for CHF and severe aortic stenosis. She was transferred to [facility] and had a BAV with Dr Solange Sol. She is scheduled to follow up with him in 3 weeks. She is breathing better and not as tired since procedure. Home health is coming and her BP and O2 have been well. She is 99% on 2L today in office and has not needed any inhalers or breathing txs. Her candida rash was almost gone but has returned slightly so will refill cream. The hospital does not have pt as taking amlodipine but pt had been on it and is currently taking it. Her BP is good so advised her to discuss with cardiology ion a few weeks at her follow up. Also they have her as taking insulin but she is not on anything for her diabetes at this time. Her A1c 2 mo ago was only 6.8 so will continue to monitor rather than start meds at this time. Allergies   Allergen Reactions    Valsartan Anaphylaxis       Current Outpatient Medications   Medication Sig    bumetanide (BUMEX) 2 mg tablet Take 2 mg by mouth two (2) times a day.  nystatin (MYCOSTATIN) topical cream Apply  to affected area two (2) times a day.  magnesium oxide (MAG-OX) 400 mg tablet Take 400 mg by mouth daily.  amLODIPine (NORVASC) 5 mg tablet take 2 tablets by mouth once daily    Wheel Chair nely Use as directed    albuterol-ipratropium (DUO-NEB) 2.5 mg-0.5 mg/3 ml nebu inhale contents of 1 vial ( 3 milliliters ) in nebulizer by mouth. ..  (REFER TO PRESCRIPTION NOTES).     Therapy M 9 mg iron-400 mcg tab tablet take 1 tablet by mouth once daily    simvastatin (ZOCOR) 10 mg tablet take 1 tablet by mouth nightly    metoprolol tartrate (LOPRESSOR) 50 mg tablet take 1/2 tablet by mouth twice a day    clopidogreL (PLAVIX) 75 mg tab take 1 tablet by mouth once daily    donepeziL (ARICEPT) 10 mg tablet take 1 tablet by mouth nightly    Walker misc Walker for aid in ambulation and preventing falls.  glucose blood VI test strips (BLOOD GLUCOSE TEST) strip Check glucose once daily. Re: DM .00    Blood-Glucose Meter monitoring kit Check glucose once daily. Re: DM .00    Lancets misc Check glucose once daily. Re: DM .00    aspirin delayed-release 81 mg tablet Take 81 mg by mouth daily. No current facility-administered medications for this visit. Review of Systems   Constitutional: Negative. Negative for chills, fever and malaise/fatigue. HENT: Negative. Negative for congestion, ear pain, sore throat and tinnitus. Eyes: Negative. Negative for blurred vision, double vision and photophobia. Respiratory: Positive for shortness of breath (improved since leaving hospital). Negative for cough, hemoptysis, sputum production and wheezing. Cardiovascular: Negative. Negative for chest pain, palpitations and leg swelling. Gastrointestinal: Negative. Negative for abdominal pain, heartburn, nausea and vomiting. Genitourinary: Negative. Negative for dysuria, frequency, hematuria and urgency. Musculoskeletal: Positive for myalgias (chronic). Negative for back pain, joint pain and neck pain. Skin: Negative. Negative for itching and rash. Neurological: Negative. Negative for dizziness, tingling, tremors and headaches. Psychiatric/Behavioral: Positive for memory loss (dementia). Negative for depression. The patient is not nervous/anxious and does not have insomnia. Visit Vitals  /71 (BP 1 Location: Left arm, BP Patient Position: Sitting, BP Cuff Size: Small adult)   Pulse 64   Temp 97.7 °F (36.5 °C) (Temporal)   Resp 16   Ht 5' 1\" (1.549 m)   Wt 137 lb (62.1 kg)   SpO2 99%   BMI 25.89 kg/m²       Physical Exam  Vitals reviewed. Constitutional:       General: She is not in acute distress. Appearance: Normal appearance. She is not ill-appearing. HENT:      Head: Normocephalic and atraumatic.    Cardiovascular: Rate and Rhythm: Normal rate and regular rhythm. Pulses: Normal pulses. Pulmonary:      Effort: Pulmonary effort is normal. No respiratory distress. Breath sounds: Normal breath sounds. Skin:     General: Skin is warm and dry. Findings: No rash. Neurological:      Mental Status: She is alert. Motor: Weakness (pt in wheelchair) present. Psychiatric:         Mood and Affect: Mood normal.         Behavior: Behavior normal.         Thought Content: Thought content normal.         Judgment: Judgment normal.         ASSESSMENT and PLAN    ICD-10-CM ICD-9-CM    1. Hospital discharge follow-up  Z09 V67.59    2. Chronic congestive heart failure, unspecified heart failure type (HCC)  I50.9 428.0    3. Cutaneous candidiasis  B37.2 112.3 nystatin (MYCOSTATIN) topical cream   4. Multi-infarct dementia with behavior disturbance ([facility]  F01.51 290.41    5. Nonrheumatic aortic valve stenosis  I35.0 424.1      Follow-up and Dispositions    · Return in about 3 months (around 8/24/2021) for follow up. Pt expressed understanding of visit summary and plans for any follow ups or referrals as well as any medications prescribed.

## 2021-05-24 NOTE — PATIENT INSTRUCTIONS
Candidiasis: Care Instructions Your Care Instructions Candidiasis (say \"tkl-pov-ZI-uh-heather\") is a yeast infection. Yeast normally lives in your body. But it can cause problems if your body's defenses don't work as they should. Some medicines can increase your chance of getting a yeast infection. These include antibiotics, steroids, and cancer drugs. And some diseases like AIDS and diabetes can make you more likely to get yeast infections. There are different types of yeast infections. Jacklyn Rom is a yeast infection in the mouth. It usually occurs in people with weak immune systems. It causes white patches inside the mouth and throat. Yeast infections of the skin usually occur in skin folds where the skin stays moist. They cause red, oozing patches on your skin. Babies can get these infections under the diaper. People who often wear gloves can get them on their hands. Many women get vaginal yeast infections. They are most common when women take antibiotics. These infections can cause the vagina to itch and burn. They also cause white discharge that looks like cottage cheese. In rare cases, yeast infects the blood. This can cause serious disease. This kind of infection is treated with medicine given through a needle into a vein (IV). After you start treatment, a yeast infection usually goes away quickly. But if your immune system is weak, the infection may come back. Tell your doctor if you get yeast infections often. Follow-up care is a key part of your treatment and safety. Be sure to make and go to all appointments, and call your doctor if you are having problems. It's also a good idea to know your test results and keep a list of the medicines you take. How can you care for yourself at home? · Take your medicines exactly as prescribed. Call your doctor if you think you are having a problem with your medicine. · Use antibiotics only as directed by your doctor. · Eat yogurt with live cultures.  It has bacteria called lactobacillus. It may help prevent some types of yeast infections. · Keep your skin clean and dry. Put powder on moist places. · If you are using a cream or suppository to treat a vaginal yeast infection, don't use condoms or a diaphragm. Use a different type of birth control. · Eat a healthy diet and get regular exercise. This will help keep your immune system strong. When should you call for help? Watch closely for changes in your health, and be sure to contact your doctor if: 
  · You do not get better as expected. Where can you learn more? Go to http://www.gray.com/ Enter S740 in the search box to learn more about \"Candidiasis: Care Instructions. \" Current as of: July 17, 2020               Content Version: 12.8 © 2006-2021 [employer]. Care instructions adapted under license by .com (which disclaims liability or warranty for this information). If you have questions about a medical condition or this instruction, always ask your healthcare professional. [address] any warranty or liability for your use of this information.

## 2021-05-25 NOTE — PROGRESS NOTES
Care Transitions Initial Call Per Chart Review, Patient's discharge from [facility] 5/13/21 noted. Care Transitions Nurse ( CTN) attempted to contact patient via telephone call. There was no response. A voicemail message was left requesting a non-emergency return telephone call. CTN  contact information provided. Care Transitions Nurse ( CTN) attempted to contact patient's family member Ms. Braulio Zapata, per [facility] form via telephone call. There was no response. A voicemail message was left requesting a non-emergency return telephone call. CTN  contact information provided. Per chart review, patient attended PCP follow up appointment on 5-24-21.

## 2021-05-26 NOTE — PROGRESS NOTES
Care Transitions Follow Up Call Care Transitions Nurse ( CTN) attempted to contact patient's family member, Ms. Darrius Wadsworth,  via telephone call as  listed on patient's PHI form. There was no response and no option  to leave a voicemail message at this time.

## 2021-06-08 NOTE — PROGRESS NOTES
Transitions of Care Care Transitions Nurse ( CTN) unable to reach patient/family member for follow up x3. Episode resolved 6-8-2021. No further care transitions nurse follow up scheduled. Goals Addressed This Visit's Progress  COMPLETED: Attends follow-up appointments as directed. Target Date:  5/28/21 Patient to follow up as below: - Patient attended Transition of Care follow up appointment on 4-27-21.   
 
6/8/2021 Patient attended PCP follow up appointment on 5-24-21  COMPLETED: Prevent complications post hospitalization. Target Date: 5/28/21 4/28/2021 Patient and/or family members were alerted to the availability of physician or other advanced practioners after hours, weekends, and holidays. Please call the PCP office phone number and speak with the answering service for assistance. Please call 911 for emergency assistance as needed. Care Transitions Nurse ( CTN)  contact information provided for follow up as needed.  COMPLETED: Supportive resources in place to maintain patient in the community (ie. Home Health, DME equipment, refer to, medication assistant plan, etc.) Target Date:  5/28/21 4/28/2021 [address] [place] [address] to provide skilled home care visits.  COMPLETED: Understands red flags post discharge. Target Date: 5/28/21 Care Transitions Nurse (CTN) reviewed red flags with patient/family as follows: Please call 911 for immediate assistance for symptoms such as:  
- Chest Pain - Severe shortness of breath  
- Change in mental status - Blue tint to face or lips - New or worsening symptoms   
- high fevers  
- other concerns. Patient/family  has care transitions nurse contact information for any further questions, concerns, or needs. Patient's upcoming visits:   
Future Appointments Date Time Provider Sean Hanna 8/24/2021 10:30 AM Leonarda Oshea PA-C [facility]

## 2021-06-23 NOTE — TELEPHONE ENCOUNTER
Pt's home health nurse is calling to inform Ms. Oshea that as of 6/23/2021 the pt is discharged from home health. Ms. Vinnie Juanguzman states the pt has met all of her goals.

## 2021-06-30 NOTE — TELEPHONE ENCOUNTER
12w2d.   Pt's daughter is requesting a call back. She states she's been waiting for a call back since the week of June 25th.

## 2021-07-23 NOTE — TELEPHONE ENCOUNTER
Requested Prescriptions     Pending Prescriptions Disp Refills    glucose blood VI test strips (True Metrix Glucose Test Strip) strip [Pharmacy Med Name: TRUE METRIX GLUCOSE TEST STRIP] 100 Strip 1     Sig: use 1 TEST STRIP to TEST BLOOD SUGAR twice a day AT 6AM AND NOON    metoprolol tartrate (LOPRESSOR) 50 mg tablet [Pharmacy Med Name: METOPROLOL TARTRATE 50 MG TAB] 90 Tablet 1     Sig: take 1/2 tablet by mouth twice a day

## 2021-08-11 NOTE — TELEPHONE ENCOUNTER
Airway  Performed by: Jd Solano MD  Authorized by: Jd Solano MD     Final Airway Type:  Endotracheal airway  Final Endotracheal Airway*:  ETT  ETT Size (mm)*:  8.0  Cuff*:  Regular  Technique Used for Successful ETT Placement:  Video laryngoscopy  Devices/Methods Used in Placement*:  Mask  Intubation Procedure*:  Preoxygenation, ETCO2, Atraumatic, Dentition Unchanged and Phaynx Clear  Insertion Site:  Oral  Blade Type*:  Video Laryngoscope  Blade Size*:  3  Initial Cuff Pressure (cm H2O):  25  Measured from*:  Teeth  Secured at (cm)*:  23  Placement Verified by: auscultation and capnometry    Glottic View*:  2 - partial view of glottis  Attempts*:  1  Number of Other Approaches Attempted:  0   Patient Identified, Procedure confirmed, Emergency equipment available and Safety protocols followed  Location:  OR  Urgency:  Elective  Difficult Airway: No    Indications for Airway Management:  Anesthesia  Spontaneous Ventilation: absent    Sedation Level:  Anesthetized  Mask Difficulty Assessment:  3 - difficult mask (inadequate, unstable or two providers) +/- NMBA  Performed By:  Anesthesiologist  Anesthesiologist:  Jd Solano MD         Pt's daughter would like to know if Ms. Oshea can write a prescription for her mother's gout flare up.

## 2021-08-12 NOTE — TELEPHONE ENCOUNTER
What has she had in the past? She cannot do NSAIDS bc she is on plavix. We could try prednisone but that will increase her blood sugar temporarily.

## 2021-08-13 NOTE — TELEPHONE ENCOUNTER
Patient has been using prednisone to help with the flare ups.  Patient has a glucometer at home and will check her sugar

## 2021-09-09 NOTE — PROGRESS NOTES
HISTORY OF PRESENT ILLNESS  Jeanna Villafuerte is a 80 y.o. female. HPI  Pt is here with her daughter for her wellness exam. She has a hx of dementia and her daughter was reading that zocor could cause dementia. She would like to try stopping it. She will stop zocor and recheck labs in 3mo. She is not wanting to eat and is not drinking very much water so her weight is continuing to decrease. Will order nutrition consult to see about supplementation if needed. She would like her flu shot today as well. Allergies   Allergen Reactions    Valsartan Anaphylaxis       Current Outpatient Medications   Medication Sig    glucose blood VI test strips (True Metrix Glucose Test Strip) strip use 1 TEST STRIP to TEST BLOOD SUGAR twice a day AT 6AM AND NOON    metoprolol tartrate (LOPRESSOR) 50 mg tablet take 1/2 tablet by mouth twice a day    magnesium oxide (MAG-OX) 400 mg tablet take 1 tablet by mouth once daily    clopidogreL (PLAVIX) 75 mg tab take 1 tablet by mouth once daily    simvastatin (ZOCOR) 10 mg tablet take 1 tablet by mouth nightly    amLODIPine (NORVASC) 5 mg tablet take 2 tablets by mouth once daily   [place] Chair nely Use as directed    Walker [employer] for aid in ambulation and preventing falls.  Blood-Glucose Meter monitoring kit Check glucose once daily. Re: DM .00    Lancets misc Check glucose once daily. Re: DM .00    aspirin delayed-release 81 mg tablet Take 81 mg by mouth daily.  donepeziL (ARICEPT) 10 mg tablet Take 1 Tablet by mouth nightly.  bumetanide (BUMEX) 2 mg tablet TAKE ONE TABLET BY MOUTH TWICE A DAY (GENERIC FOR BUMEX)    predniSONE (DELTASONE) 20 mg tablet Take 2 tabs by mouth once daily (Patient not taking: Reported on 9/9/2021)    nystatin (MYCOSTATIN) topical cream Apply  to affected area two (2) times a day.  (Patient not taking: Reported on 9/9/2021)    albuterol-ipratropium (DUO-NEB) 2.5 mg-0.5 mg/3 ml nebu inhale contents of 1 vial ( 3 milliliters ) in nebulizer by mouth. ..  (REFER TO PRESCRIPTION NOTES). (Patient not taking: Reported on 9/9/2021)    Therapy M 9 mg iron-400 mcg tab tablet take 1 tablet by mouth once daily (Patient not taking: Reported on 9/9/2021)     No current facility-administered medications for this visit. Review of Systems   Constitutional: Negative. Negative for chills, fever and malaise/fatigue. HENT: Negative. Negative for congestion, ear pain, sore throat and tinnitus. Eyes: Negative. Negative for blurred vision, double vision and photophobia. Respiratory: Negative. Negative for cough, hemoptysis, sputum production, shortness of breath and wheezing. Cardiovascular: Negative. Negative for chest pain, palpitations and leg swelling. Gastrointestinal: Negative. Negative for abdominal pain, heartburn, nausea and vomiting. Genitourinary: Negative. Negative for dysuria, frequency, hematuria and urgency. Musculoskeletal: Positive for myalgias (chronic). Negative for back pain, joint pain and neck pain. Skin: Negative. Negative for itching and rash. Neurological: Negative. Negative for dizziness, tingling, tremors and headaches. Psychiatric/Behavioral: Positive for memory loss (dementia). Negative for depression. The patient is not nervous/anxious and does not have insomnia. Visit Vitals  /60 (BP 1 Location: Left upper arm, BP Patient Position: Sitting, BP Cuff Size: Small adult)   Pulse (!) 58   Temp 97.8 °F (36.6 °C) (Temporal)   Resp 17   Ht 5' 1\" (1.549 m)   Wt 124 lb (56.2 kg)   SpO2 98%   BMI 23.43 kg/m²       Physical Exam  Vitals reviewed. Constitutional:       General: She is not in acute distress. Appearance: Normal appearance. She is not ill-appearing. HENT:      Head: Normocephalic and atraumatic. Cardiovascular:      Rate and Rhythm: Normal rate and regular rhythm. Pulses: Normal pulses.    Pulmonary:      Effort: Pulmonary effort is normal. No respiratory distress. Breath sounds: Normal breath sounds. Skin:     General: Skin is warm and dry. Findings: No rash. Neurological:      Mental Status: She is alert. Motor: Weakness (pt in wheelchair) present. Comments: Pt has dementia   Psychiatric:         Mood and Affect: Mood normal.         Behavior: Behavior normal.         Thought Content: Thought content normal.         Judgment: Judgment normal.         ASSESSMENT and PLAN    ICD-10-CM ICD-9-CM    1. 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 Encounter for immunization  Z23 V03.89 ADMIN INFLUENZA VIRUS VAC   3. Weight loss  R63.4 783.21 REFERRAL TO NUTRITION   4. Underweight due to inadequate caloric intake  R63.6 783.22 REFERRAL TO NUTRITION   5. Needs flu shot  Z23 V04.81 FLU (FLUAD QUAD INFLUENZA VACCINE,QUAD,ADJUVANTED)   6. Type 2 diabetes with nephropathy (HCC)  E11.21 250.40      583.81    7. Medicare annual wellness visit, subsequent  Z00.00 V70.0      Follow-up and Dispositions    · Return in about 3 months (around 12/9/2021) for follow up. Pt expressed understanding of visit summary and plans for any follow ups or referrals as well as any medications prescribed. Medicare Wellness Visit  Yosi Licona is a 80 y.o. female and presents for annual Medicare Wellness Visit. Problem List: Reviewed with patient and discussed risk factors.     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 No pertinent surgical history. SH: Reviewed with patient  Social History     Tobacco Use    Smoking status: Former Smoker     Quit date: 2005     Years since quittin.7    Smokeless tobacco: Never Used    Tobacco comment: does not remember how many years for smoking/packs    Substance Use Topics    Alcohol use: No     Alcohol/week: 0.0 standard drinks    Drug use: No       FH: Reviewed with patient  History reviewed. No pertinent family history. Medications/Allergies: Reviewed with patient  Current Outpatient Medications on File Prior to Visit   Medication Sig Dispense Refill    glucose blood VI test strips (True Metrix Glucose Test Strip) strip use 1 TEST STRIP to TEST BLOOD SUGAR twice a day AT 6AM AND NOON 100 Strip 1    metoprolol tartrate (LOPRESSOR) 50 mg tablet take 1/2 tablet by mouth twice a day 90 Tablet 0    magnesium oxide (MAG-OX) 400 mg tablet take 1 tablet by mouth once daily 30 Tablet 2    clopidogreL (PLAVIX) 75 mg tab take 1 tablet by mouth once daily 90 Tablet 1    simvastatin (ZOCOR) 10 mg tablet take 1 tablet by mouth nightly 90 Tablet 1    amLODIPine (NORVASC) 5 mg tablet take 2 tablets by mouth once daily 180 Tab 1    Wheel Chair nely Use as directed 1 Each 0    Walker [employer] Walker for aid in ambulation and preventing falls. 1 Each 0    Blood-Glucose Meter monitoring kit Check glucose once daily. Re: DM .00 1 Kit 0    Lancets misc Check glucose once daily.   Re: DM .00 1 Each 11    aspirin delayed-release 81 mg tablet Take 81 mg by mouth daily.  predniSONE (DELTASONE) 20 mg tablet Take 2 tabs by mouth once daily (Patient not taking: Reported on 9/9/2021) 10 Tablet 0    nystatin (MYCOSTATIN) topical cream Apply  to affected area two (2) times a day. (Patient not taking: Reported on 9/9/2021) 30 g 1    albuterol-ipratropium (DUO-NEB) 2.5 mg-0.5 mg/3 ml nebu inhale contents of 1 vial ( 3 milliliters ) in nebulizer by mouth. ..  (REFER TO PRESCRIPTION NOTES). (Patient not taking: Reported on 9/9/2021)      Therapy M 9 mg iron-400 mcg tab tablet take 1 tablet by mouth once daily (Patient not taking: Reported on 9/9/2021)       No current facility-administered medications on file prior to visit. Allergies   Allergen Reactions    Valsartan Anaphylaxis       Objective:  Visit Vitals  /60 (BP 1 Location: Left upper arm, BP Patient Position: Sitting, BP Cuff Size: Small adult)   Pulse (!) 58   Temp 97.8 °F (36.6 °C) (Temporal)   Resp 17   Ht 5' 1\" (1.549 m)   Wt 124 lb (56.2 kg)   SpO2 98%   BMI 23.43 kg/m²    Body mass index is 23.43 kg/m². Assessment of cognitive impairment: Alert and oriented x 3    Depression Screen:   3 most recent PHQ Screens 9/9/2021   Little interest or pleasure in doing things Not at all   Feeling down, depressed, irritable, or hopeless Not at all   Total Score PHQ 2 0       Fall Risk Assessment:    Fall Risk Assessment, last 12 mths 9/9/2021   Able to walk? Yes   Fall in past 12 months? 0   Do you feel unsteady? 0   Are you worried about falling 0   Is TUG test greater than 12 seconds? -   Is the gait abnormal? -   Number of falls in past 12 months -       Functional Ability:   Does the patient exhibit a steady gait?  no   How long did it take the patient to get up and walk from a sitting position? 10-20sec w walker/assistance   Is the patient self reliant?  (ie can do own laundry, meals, household chores)  no     Does the patient handle his/her own medications? no     Does the patient handle his/her own money? no     Is the patients home safe (ie good lighting, handrails on stairs and bath, etc.)? yes     Did you notice or did patient express any hearing difficulties? yes     Did you notice or did patient express any vision difficulties? yes     Were distance and reading eye charts used? No - sees ophthalmology       Advance Care Planning:   Patient was offered the opportunity to discuss advance care planning:  no     Does patient have an Advance Directive:  yes   If no, did you provide information on Caring Connections?  no       Plan:      Orders Placed This Encounter    Influenza Vaccine, QUAD, 65 Yrs +  IM  (Fluad [id number] )    REFERRAL TO [facility] Maintenance   Topic Date Due    COVID-19 Vaccine (1) Never done    Shingrix Vaccine Age 50> (1 of 2) Never done    Foot Exam Q1  09/17/2020    MICROALBUMIN Q1  01/16/2021    Eye Exam Retinal or Dilated  04/05/2021    Lipid Screen  09/15/2021    Medicare Yearly Exam  09/10/2022    DTaP/Tdap/Td series (2 - Tdap) 02/24/2027    Bone Densitometry (Dexa) Screening  Completed    Flu Vaccine  Completed    Pneumococcal 65+ years  Completed       *Patient verbalized understanding and agreement with the plan. A copy of the After Visit Summary with personalized health plan was given to the patient today.

## 2021-09-13 NOTE — PATIENT INSTRUCTIONS
Schedule of Personalized Health Plan  (Provide Copy to Patient)  The best way to stay healthy is to live a healthy lifestyle. A healthy lifestyle includes regular exercise, eating a well-balanced diet, keeping a healthy weight and not smoking. Regular physical exams and screening tests are another important way to take care of yourself. Preventive exams provided by health care providers can find health problems early when treatment works best and can keep you from getting certain diseases or illnesses. Preventive services include exams, lab tests, screenings, shots, monitoring and information to help you take care of your own health. All people over 65 should have a pneumonia shot. Pneumonia shots are usually only needed once in a lifetime unless your doctor decides differently. All people over 65 should have a yearly flu shot. People over 65 are at medium to high risk for Hepatitis B. Three shots are needed for complete protection. In addition to your physical exam, some screening tests are recommended:    Bone mass measurement (dexa scan) is recommended every two years  Diabetes Mellitus screening is recommended every year. Glaucoma is an eye disease caused by high pressure in the eye. An eye exam is recommended every year. Cardiovascular screening tests that check your cholesterol and other blood fat (lipid) levels are recommended every five years. Colorectal Cancer screening tests help to find pre-cancerous polyps (growths in the colon) so they can be removed before they turn into cancer. Tests ordered for screening depend on your personal and family history risk factors.     Screening for Breast Cancer is recommended yearly with a mammogram.    Screening for Cervical Cancer is recommended every two years (annually for certain risk factors, such as previous history of STD or abnormal PAP in past 7 years), with a Pelvic Exam with PAP    Here is a list of your current Health Maintenance items with a due date:  Health Maintenance   Topic Date Due    COVID-19 Vaccine (1) Never done    Shingrix Vaccine Age 50> (1 of 2) Never done    Foot Exam Q1  09/17/2020    MICROALBUMIN Q1  01/16/2021    Eye Exam Retinal or Dilated  04/05/2021    Lipid Screen  09/15/2021    Medicare Yearly Exam  09/10/2022    DTaP/Tdap/Td series (2 - Tdap) 02/24/2027    Bone Densitometry (Dexa) Screening  Completed    Flu Vaccine  Completed    Pneumococcal 65+ years  Completed

## 2021-09-28 NOTE — TELEPHONE ENCOUNTER
Patient's daughter is requesting a prescription for the following. Requested Prescriptions     Pending Prescriptions Disp Refills    donepeziL (ARICEPT) 10 mg tablet 90 Tablet 3     Sig: Take 1 Tablet by mouth nightly.

## 2021-10-01 NOTE — TELEPHONE ENCOUNTER
Patients daughter spoke with LPN and informed that patient went to the ER and was DX with cellulitis.  Daughter is asking for a referral to a hand specialist.

## 2021-10-07 NOTE — PROGRESS NOTES
[address] [place] [address] 51, [address] [place], [place], 70 [facility]  Phone: [phone]  Fax: [phone]   Nutrition Assessment  Medical Nutrition Therapy   Outpatient Initial Evaluation         Patient Name: Haile Middleton : 1930   Treatment Diagnosis: DM2, CKD stage 3   Referral Source: Uli Brambila* Start of Care [facility]): 10/7/2021     Gender: female Age: 80 y.o. Ht: 61 in Wt:  124 lb     BMI: 23.4 BMR   Male  BMR Female 970   Anthropometrics Assessment: Healthy weight     Past Medical History includes: DM2, CKD stage 3, gout, HTN, CHF     Pertinent Medications:   Lopressor, plavix, zocor, norvasc, bumex     Biochemical Data:   Lab Results   Component Value Date/Time    Hemoglobin A1c 7.0 (H) 2021 10:27 AM    Hemoglobin A1c (POC) 6.4 2020 10:57 AM     Lab Results   Component Value Date/Time    Cholesterol, total 115 09/15/2020 08:50 AM    HDL Cholesterol 62 (H) 09/15/2020 08:50 AM    LDL, calculated 26.4 09/15/2020 08:50 AM    VLDL, calculated 26.6 09/15/2020 08:50 AM    Triglyceride 133 09/15/2020 08:50 AM    CHOL/HDL Ratio 1.9 09/15/2020 08:50 AM     Lab Results   Component Value Date/Time    ALT (SGPT) 16 2021 09:19 AM    Alk. phosphatase 67 2021 09:19 AM    Bilirubin, direct 0.2 2017 10:41 AM    Bilirubin, total 0.6 2021 09:19 AM     Lab Results   Component Value Date/Time    Creatinine 1.57 (H) 2021 09:19 AM     Lab Results   Component Value Date/Time    BUN 24 (H) 2021 09:19 AM     Lab Results   Component Value Date/Time    Microalbumin/Creat ratio (mg/g creat) 981 (H) 2020 11:00 AM    Microalbumin,urine random 305.00 (H) 2020 11:00 AM        Subjective/Assessment:   Patient seen today in nutrition for initial assessment for recent weight loss and diabetes management. Since April the patient has gone from 148 pounds to 124 pounds.  Per her daughter she was hospitalized twice in April and May and her appetite had been low when she was sick. Per patients daughter she does not think she has lost any more weight recently. She reports her appetite is normal but occasionally she will not finish her meals. She is not on medication for her diabetes and her last A1C was 7.0. They are not currently checking her blood sugar at home. Nutrition Diagnosis: Food and nutrition related knowledge deficit related to carbohydrate and meal planning for DM type 2 as evidenced by no previous education provided on DM diet. Nutrition Diagnosis:  Inadequate oral intake related to poor appetite when she was sick and hospitalized as evidenced by weight loss. Current Eating Patterns: Diet recall:  Breakfast: oatmeal with fruit and turkey sausage with OJ and occasionally coffee  Snack: fruit, nuts  Lunch: salad, sandwich, soup and water  Snack: fruit or occasionally chips   Dinner: protein (chicken and fish mainly), vegetables and carbohydrates  Snack: occasionally fruit, or jennifer crackers    Fluids: mainly water, juice and maybe 1 diet soda per day. No alcohol. No food allergies. No supplements. Limited activity due to difficulty getting around. Patient lives with her daughter who does all the cooking. She has in the past taken Glucerna but not currently. Estimate Needs   Calories:  1438 Protein: 33-45 Carbs:      Kcal/day  g/day  g/day      Education provided:   Intervention:  Education, Comprehensive - reviewed prescription below. Nutrition education provided on medical nutrition therapy for type 2 Diabetes Mellitus. Provided education materials on food label reading, food groups (carbohydrates, proteins and non-starchy vegetables), and meal planning for diabetes control. We discussed the importance of having a protein source with each meal and snack to help with blood sugar control and hunger. The patient was provided with my contact info.  Patient asked questions and indicated understanding.        Handouts Provided: [x]  Carbohydrates  [x]  Protein  []  Fiber  []  Serving Sizes  []  Meal Ideas  []  Non-starchy Vegetables [x]  Diabetes  []  Cholesterol  []  Sodium  [x]  Food labels  []  Snacks  []  Others:   Information Reviewed with: Patient and daughter   Readiness to Change Stage: []  Pre-contemplative    []  Contemplative  []  Preparation               [x]  Action                  []  Maintenance   Potential Barriers to Learning: []  Decline in memory    []  Language barrier   []  Other:  []  Emotional                  []  Limited mobility  []  Lack of motivation     [x] Vision, hearing or cognitive impairment   Expected Compliance: Good      Nutritional Goal - To promote lifestyle changes to result in:    []  Weight loss  []  Improved diabetic control  []  Decreased cholesterol levels  []  Decreased blood pressure  [x]  Weight maintenance []  Preventing any interactions associated with food allergies  []  Adequate weight gain toward goal weight  []  Other:        Nutrition Prescription/  Patient Goals:    Add in MVI daily   Check vitamin D and B12 at next lab check   Add in glucerna or equivalent if she does not eat at least half of her meal   Decrease intake of juice and replace with sugar free alternative (crystal light, True lemon, sparkling water)       Dietitian Signature: Estuardo Faustin MS, RD Date: 10/7/2021   Follow-up: As needed Time: 9:41 AM

## 2021-10-26 NOTE — PROGRESS NOTES
.Date/Time:  10/26/2021 2:01 PM    Method of communication with patient:phone    [address] [facility] ( [facility]) made out reach to  patient by telephone to offer Complex Case Management  ( CCM). Provided introduction to self, and explanation of the Ambulatory Care . Contact at [phone] anytime Monday thru Friday 08:00-4:30.
walker, commode

## 2021-10-28 NOTE — PROGRESS NOTES
.Date/Time:  10/28/2021 11:20 AM    Method of communication with patient:phone    [address] [facility] ( [facility]) made second out reach to  patient by telephone to offer Complex Case Management  ( CCM). Provided introduction to self, and explanation of the Ambulatory Care . Contact at [phone] anytime Monday thru Friday 08:00-4:30.

## 2021-11-01 NOTE — PROGRESS NOTES
.Date/Time:  11/1/2021 3:20 PM    Method of communication with patient:phone    [address] [facility] ( [facility]) made third out reach to  patient by telephone to offer Complex Case Management  ( CCM). Provided introduction to self, and explanation of the Ambulatory Care . Contact at [phone] anytime Monday thru Friday 08:00-4:30.

## 2021-11-16 NOTE — TELEPHONE ENCOUNTER
Requested Prescriptions     Pending Prescriptions Disp Refills    magnesium oxide (MAG-OX) 400 mg tablet 30 Tablet 2     Sig: Take 1 Tablet by mouth daily.      Future Appointments   Date Time Provider Sean Hanna   12/9/2021 10:00 AM William Oshea PA-C [facility]

## 2021-12-02 ENCOUNTER — TELEPHONE (OUTPATIENT)
Dept: FAMILY MEDICINE CLINIC | Age: 86
End: 2021-12-02